# Patient Record
Sex: FEMALE | Race: WHITE | URBAN - NONMETROPOLITAN AREA
[De-identification: names, ages, dates, MRNs, and addresses within clinical notes are randomized per-mention and may not be internally consistent; named-entity substitution may affect disease eponyms.]

---

## 2017-06-14 ENCOUNTER — OFFICE VISIT - GICH (OUTPATIENT)
Dept: FAMILY MEDICINE | Facility: OTHER | Age: 82
End: 2017-06-14

## 2017-06-14 ENCOUNTER — HISTORY (OUTPATIENT)
Dept: FAMILY MEDICINE | Facility: OTHER | Age: 82
End: 2017-06-14

## 2017-06-14 DIAGNOSIS — Z66 DO NOT RESUSCITATE: ICD-10-CM

## 2017-06-14 DIAGNOSIS — H54.40 BLINDNESS OF ONE EYE WITH NORMAL VISION IN CONTRALATERAL EYE: ICD-10-CM

## 2017-06-14 DIAGNOSIS — Z95.2 PRESENCE OF PROSTHETIC HEART VALVE: ICD-10-CM

## 2017-06-14 DIAGNOSIS — I10 ESSENTIAL (PRIMARY) HYPERTENSION: ICD-10-CM

## 2017-06-14 DIAGNOSIS — E78.5 HYPERLIPIDEMIA: ICD-10-CM

## 2017-06-14 DIAGNOSIS — J30.2 OTHER SEASONAL ALLERGIC RHINITIS: ICD-10-CM

## 2017-06-14 LAB
INR - HISTORICAL: 2
PROTIME - HISTORICAL: 21.9 SEC (ref 11.9–15.2)

## 2017-06-15 ENCOUNTER — AMBULATORY - GICH (OUTPATIENT)
Dept: SCHEDULING | Facility: OTHER | Age: 82
End: 2017-06-15

## 2017-06-28 ENCOUNTER — ANTICOAGULATION - GICH (OUTPATIENT)
Dept: INTERNAL MEDICINE | Facility: OTHER | Age: 82
End: 2017-06-28

## 2017-06-28 ENCOUNTER — COMMUNICATION - GICH (OUTPATIENT)
Dept: FAMILY MEDICINE | Facility: OTHER | Age: 82
End: 2017-06-28

## 2017-06-28 DIAGNOSIS — Z79.01 LONG TERM CURRENT USE OF ANTICOAGULANT: ICD-10-CM

## 2017-06-28 DIAGNOSIS — Z95.2 PRESENCE OF PROSTHETIC HEART VALVE: ICD-10-CM

## 2017-06-28 LAB — INR - HISTORICAL: 3.1

## 2017-06-29 ENCOUNTER — AMBULATORY - GICH (OUTPATIENT)
Dept: SCHEDULING | Facility: OTHER | Age: 82
End: 2017-06-29

## 2017-07-12 ENCOUNTER — COMMUNICATION - GICH (OUTPATIENT)
Dept: FAMILY MEDICINE | Facility: OTHER | Age: 82
End: 2017-07-12

## 2017-07-12 DIAGNOSIS — R60.9 EDEMA: ICD-10-CM

## 2017-07-14 ENCOUNTER — ANTICOAGULATION - GICH (OUTPATIENT)
Dept: INTERNAL MEDICINE | Facility: OTHER | Age: 82
End: 2017-07-14

## 2017-07-14 DIAGNOSIS — Z79.01 LONG TERM CURRENT USE OF ANTICOAGULANT: ICD-10-CM

## 2017-07-14 DIAGNOSIS — Z95.2 PRESENCE OF PROSTHETIC HEART VALVE: ICD-10-CM

## 2017-07-14 LAB — INR - HISTORICAL: 3.3

## 2017-07-18 ENCOUNTER — MEDICAL CORRESPONDENCE (OUTPATIENT)
Dept: CARDIOLOGY | Facility: CLINIC | Age: 82
End: 2017-07-18
Payer: MEDICARE

## 2017-07-18 ENCOUNTER — OFFICE VISIT - GICH (OUTPATIENT)
Dept: CARDIOLOGY | Facility: OTHER | Age: 82
End: 2017-07-18

## 2017-07-18 ENCOUNTER — AMBULATORY - GICH (OUTPATIENT)
Dept: SCHEDULING | Facility: OTHER | Age: 82
End: 2017-07-18

## 2017-07-18 DIAGNOSIS — I10 ESSENTIAL (PRIMARY) HYPERTENSION: ICD-10-CM

## 2017-07-18 DIAGNOSIS — Z95.2 PRESENCE OF PROSTHETIC HEART VALVE: ICD-10-CM

## 2017-07-18 DIAGNOSIS — E78.49 OTHER HYPERLIPIDEMIA: ICD-10-CM

## 2017-07-18 PROCEDURE — 99204 OFFICE O/P NEW MOD 45 MIN: CPT | Mod: ZP | Performed by: NURSE PRACTITIONER

## 2017-07-19 ENCOUNTER — HISTORY (OUTPATIENT)
Dept: FAMILY MEDICINE | Facility: OTHER | Age: 82
End: 2017-07-19

## 2017-07-19 ENCOUNTER — OFFICE VISIT - GICH (OUTPATIENT)
Dept: FAMILY MEDICINE | Facility: OTHER | Age: 82
End: 2017-07-19

## 2017-07-19 DIAGNOSIS — R35.0 FREQUENCY OF MICTURITION: ICD-10-CM

## 2017-07-19 DIAGNOSIS — R60.9 EDEMA: ICD-10-CM

## 2017-07-19 DIAGNOSIS — G47.00 INSOMNIA: ICD-10-CM

## 2017-07-19 DIAGNOSIS — I10 ESSENTIAL (PRIMARY) HYPERTENSION: ICD-10-CM

## 2017-07-19 LAB
ALBUMIN SERPL-MCNC: 4.2 G/DL (ref 3.5–5.7)
ANION GAP - HISTORICAL: 10 (ref 5–18)
BACTERIA URINE: NORMAL BACTERIA/HPF
BILIRUB UR QL: NEGATIVE
BUN SERPL-MCNC: 21 MG/DL (ref 7–25)
BUN/CREAT RATIO - HISTORICAL: 25
CALCIUM SERPL-MCNC: 9.8 MG/DL (ref 8.6–10.3)
CHLORIDE SERPLBLD-SCNC: 99 MMOL/L (ref 98–107)
CLARITY, URINE: CLEAR CLARITY
CO2 SERPL-SCNC: 28 MMOL/L (ref 21–31)
COLOR UR: YELLOW COLOR
CREAT SERPL-MCNC: 0.84 MG/DL (ref 0.7–1.3)
EPITHELIAL CELLS: NORMAL EPI/HPF
GFR IF NOT AFRICAN AMERICAN - HISTORICAL: >60 ML/MIN/1.73M2
GLUCOSE SERPL-MCNC: 93 MG/DL (ref 70–105)
GLUCOSE URINE: NEGATIVE MG/DL
HEMOGLOBIN: 12.1 G/DL (ref 12–16)
KETONES UR QL: NEGATIVE MG/DL
LEUKOCYTE ESTERASE URINE: ABNORMAL
MCV RBC AUTO: 82 FL (ref 80–100)
NITRITE UR QL STRIP: NEGATIVE
OCCULT BLOOD,URINE - HISTORICAL: NEGATIVE
PH UR: 6.5 [PH]
PHOSPHATE SERPL-MCNC: 3.6 MG/DL (ref 2.5–5)
POTASSIUM SERPL-SCNC: 4.5 MMOL/L (ref 3.5–5.1)
PROTEIN QUALITATIVE,URINE - HISTORICAL: NEGATIVE MG/DL
RBC - HISTORICAL: NORMAL /HPF
SODIUM SERPL-SCNC: 137 MMOL/L (ref 133–143)
SP GR UR STRIP: 1.01
UROBILINOGEN,QUALITATIVE - HISTORICAL: NORMAL EU/DL
WBC - HISTORICAL: NORMAL /HPF

## 2017-07-20 ENCOUNTER — COMMUNICATION - GICH (OUTPATIENT)
Dept: FAMILY MEDICINE | Facility: OTHER | Age: 82
End: 2017-07-20

## 2017-08-01 ENCOUNTER — COMMUNICATION - GICH (OUTPATIENT)
Dept: FAMILY MEDICINE | Facility: OTHER | Age: 82
End: 2017-08-01

## 2017-08-01 DIAGNOSIS — I10 ESSENTIAL (PRIMARY) HYPERTENSION: ICD-10-CM

## 2017-08-03 ENCOUNTER — COMMUNICATION - GICH (OUTPATIENT)
Dept: FAMILY MEDICINE | Facility: OTHER | Age: 82
End: 2017-08-03

## 2017-08-03 ENCOUNTER — HISTORY (OUTPATIENT)
Dept: EMERGENCY MEDICINE | Facility: OTHER | Age: 82
End: 2017-08-03

## 2017-08-04 ENCOUNTER — HISTORY (OUTPATIENT)
Dept: FAMILY MEDICINE | Facility: OTHER | Age: 82
End: 2017-08-04

## 2017-08-04 ENCOUNTER — OFFICE VISIT - GICH (OUTPATIENT)
Dept: FAMILY MEDICINE | Facility: OTHER | Age: 82
End: 2017-08-04

## 2017-08-04 ENCOUNTER — HOSPITAL ENCOUNTER (OUTPATIENT)
Dept: RADIOLOGY | Facility: OTHER | Age: 82
End: 2017-08-04
Attending: FAMILY MEDICINE

## 2017-08-04 DIAGNOSIS — M79.662 PAIN OF LEFT LOWER LEG: ICD-10-CM

## 2017-08-04 DIAGNOSIS — M79.89 OTHER SPECIFIED SOFT TISSUE DISORDERS (CODE): ICD-10-CM

## 2017-08-04 DIAGNOSIS — I10 ESSENTIAL (PRIMARY) HYPERTENSION: ICD-10-CM

## 2017-08-04 DIAGNOSIS — M25.562 PAIN IN LEFT KNEE: ICD-10-CM

## 2017-08-04 DIAGNOSIS — R31.9 HEMATURIA: ICD-10-CM

## 2017-08-04 DIAGNOSIS — M71.22 SYNOVIAL CYST OF LEFT POPLITEAL SPACE: ICD-10-CM

## 2017-08-04 DIAGNOSIS — D72.829 ELEVATED WHITE BLOOD CELL COUNT: ICD-10-CM

## 2017-08-04 LAB
C-REACTIVE PROTEIN - HISTORICAL: 7.14 MG/DL
WHITE BLOOD COUNT - HISTORICAL: 10.3 THOU/CU MM (ref 4.5–11)

## 2017-08-08 ENCOUNTER — HISTORY (OUTPATIENT)
Dept: ORTHOPEDICS | Facility: OTHER | Age: 82
End: 2017-08-08

## 2017-08-08 ENCOUNTER — OFFICE VISIT - GICH (OUTPATIENT)
Dept: ORTHOPEDICS | Facility: OTHER | Age: 82
End: 2017-08-08

## 2017-08-08 DIAGNOSIS — M17.12 PRIMARY OSTEOARTHRITIS OF LEFT KNEE: ICD-10-CM

## 2017-08-08 DIAGNOSIS — M11.262 OTHER CHONDROCALCINOSIS, LEFT KNEE: ICD-10-CM

## 2017-08-08 DIAGNOSIS — M71.22 SYNOVIAL CYST OF LEFT POPLITEAL SPACE: ICD-10-CM

## 2017-08-09 ENCOUNTER — OFFICE VISIT - GICH (OUTPATIENT)
Dept: ORTHOPEDICS | Facility: OTHER | Age: 82
End: 2017-08-09

## 2017-08-09 ENCOUNTER — HISTORY (OUTPATIENT)
Dept: ORTHOPEDICS | Facility: OTHER | Age: 82
End: 2017-08-09

## 2017-08-09 DIAGNOSIS — M11.262 OTHER CHONDROCALCINOSIS, LEFT KNEE: ICD-10-CM

## 2017-08-09 DIAGNOSIS — M25.562 PAIN IN LEFT KNEE: ICD-10-CM

## 2017-08-09 DIAGNOSIS — M17.12 PRIMARY OSTEOARTHRITIS OF LEFT KNEE: ICD-10-CM

## 2017-08-11 ENCOUNTER — ANTICOAGULATION - GICH (OUTPATIENT)
Dept: INTERNAL MEDICINE | Facility: OTHER | Age: 82
End: 2017-08-11

## 2017-08-11 DIAGNOSIS — Z95.2 PRESENCE OF PROSTHETIC HEART VALVE: ICD-10-CM

## 2017-08-11 DIAGNOSIS — Z79.01 LONG TERM CURRENT USE OF ANTICOAGULANT: ICD-10-CM

## 2017-08-11 LAB — INR - HISTORICAL: 5.4

## 2017-08-24 ENCOUNTER — ANTICOAGULATION - GICH (OUTPATIENT)
Dept: INTERNAL MEDICINE | Facility: OTHER | Age: 82
End: 2017-08-24

## 2017-08-24 DIAGNOSIS — Z79.01 LONG TERM CURRENT USE OF ANTICOAGULANT: ICD-10-CM

## 2017-08-24 DIAGNOSIS — Z95.2 PRESENCE OF PROSTHETIC HEART VALVE: ICD-10-CM

## 2017-08-24 LAB — INR - HISTORICAL: 2.7

## 2017-09-12 ENCOUNTER — ANTICOAGULATION - GICH (OUTPATIENT)
Dept: INTERNAL MEDICINE | Facility: OTHER | Age: 82
End: 2017-09-12

## 2017-09-12 DIAGNOSIS — Z79.01 LONG TERM CURRENT USE OF ANTICOAGULANT: ICD-10-CM

## 2017-09-12 DIAGNOSIS — Z95.2 PRESENCE OF PROSTHETIC HEART VALVE: ICD-10-CM

## 2017-09-12 LAB — INR - HISTORICAL: 4.7

## 2017-09-19 ENCOUNTER — COMMUNICATION - GICH (OUTPATIENT)
Dept: FAMILY MEDICINE | Facility: OTHER | Age: 82
End: 2017-09-19

## 2017-09-19 ENCOUNTER — ANTICOAGULATION - GICH (OUTPATIENT)
Dept: INTERNAL MEDICINE | Facility: OTHER | Age: 82
End: 2017-09-19

## 2017-09-19 DIAGNOSIS — Z95.2 PRESENCE OF PROSTHETIC HEART VALVE: ICD-10-CM

## 2017-09-19 DIAGNOSIS — Z79.01 LONG TERM CURRENT USE OF ANTICOAGULANT: ICD-10-CM

## 2017-09-19 LAB — INR - HISTORICAL: 3.5

## 2017-10-03 ENCOUNTER — ANTICOAGULATION - GICH (OUTPATIENT)
Dept: INTERNAL MEDICINE | Facility: OTHER | Age: 82
End: 2017-10-03

## 2017-10-03 DIAGNOSIS — Z95.2 PRESENCE OF PROSTHETIC HEART VALVE: ICD-10-CM

## 2017-10-03 DIAGNOSIS — Z79.01 LONG TERM CURRENT USE OF ANTICOAGULANT: ICD-10-CM

## 2017-10-03 LAB — INR - HISTORICAL: 3.8

## 2017-10-23 ENCOUNTER — ANTICOAGULATION - GICH (OUTPATIENT)
Dept: INTERNAL MEDICINE | Facility: OTHER | Age: 82
End: 2017-10-23

## 2017-10-23 DIAGNOSIS — Z95.2 PRESENCE OF PROSTHETIC HEART VALVE: ICD-10-CM

## 2017-10-23 DIAGNOSIS — Z79.01 LONG TERM CURRENT USE OF ANTICOAGULANT: ICD-10-CM

## 2017-10-23 LAB — INR - HISTORICAL: 3.3

## 2017-10-24 ENCOUNTER — COMMUNICATION - GICH (OUTPATIENT)
Dept: FAMILY MEDICINE | Facility: OTHER | Age: 82
End: 2017-10-24

## 2017-10-30 ENCOUNTER — HOSPITAL ENCOUNTER (OUTPATIENT)
Dept: RADIOLOGY | Facility: OTHER | Age: 82
End: 2017-10-30
Attending: FAMILY MEDICINE

## 2017-10-30 ENCOUNTER — OFFICE VISIT - GICH (OUTPATIENT)
Dept: FAMILY MEDICINE | Facility: OTHER | Age: 82
End: 2017-10-30

## 2017-10-30 ENCOUNTER — HISTORY (OUTPATIENT)
Dept: FAMILY MEDICINE | Facility: OTHER | Age: 82
End: 2017-10-30

## 2017-10-30 DIAGNOSIS — Z95.2 PRESENCE OF PROSTHETIC HEART VALVE: ICD-10-CM

## 2017-10-30 DIAGNOSIS — T14.8XXA OTHER INJURY OF UNSPECIFIED BODY REGION, INITIAL ENCOUNTER (CODE): ICD-10-CM

## 2017-10-30 DIAGNOSIS — Z79.01 LONG TERM CURRENT USE OF ANTICOAGULANT: ICD-10-CM

## 2017-10-30 LAB
INR - HISTORICAL: 2.8
PROTIME - HISTORICAL: 33.2 SEC (ref 11.9–15.2)

## 2017-11-20 ENCOUNTER — ANTICOAGULATION - GICH (OUTPATIENT)
Dept: INTERNAL MEDICINE | Facility: OTHER | Age: 82
End: 2017-11-20

## 2017-11-20 DIAGNOSIS — Z95.2 PRESENCE OF PROSTHETIC HEART VALVE: ICD-10-CM

## 2017-11-20 DIAGNOSIS — Z79.01 LONG TERM CURRENT USE OF ANTICOAGULANT: ICD-10-CM

## 2017-11-20 LAB — INR - HISTORICAL: 3.8

## 2017-11-28 ENCOUNTER — OFFICE VISIT - GICH (OUTPATIENT)
Dept: FAMILY MEDICINE | Facility: OTHER | Age: 82
End: 2017-11-28

## 2017-11-28 ENCOUNTER — COMMUNICATION - GICH (OUTPATIENT)
Dept: FAMILY MEDICINE | Facility: OTHER | Age: 82
End: 2017-11-28

## 2017-11-28 ENCOUNTER — HISTORY (OUTPATIENT)
Dept: FAMILY MEDICINE | Facility: OTHER | Age: 82
End: 2017-11-28

## 2017-11-28 DIAGNOSIS — J06.9 ACUTE UPPER RESPIRATORY INFECTION: ICD-10-CM

## 2017-11-28 DIAGNOSIS — R05.9 COUGH: ICD-10-CM

## 2017-11-28 DIAGNOSIS — B97.89 OTHER VIRAL AGENTS AS THE CAUSE OF DISEASES CLASSIFIED ELSEWHERE: ICD-10-CM

## 2017-11-29 ENCOUNTER — COMMUNICATION - GICH (OUTPATIENT)
Dept: FAMILY MEDICINE | Facility: OTHER | Age: 82
End: 2017-11-29

## 2017-11-29 DIAGNOSIS — I10 ESSENTIAL (PRIMARY) HYPERTENSION: ICD-10-CM

## 2017-12-06 ENCOUNTER — ANTICOAGULATION - GICH (OUTPATIENT)
Dept: INTERNAL MEDICINE | Facility: OTHER | Age: 82
End: 2017-12-06

## 2017-12-06 DIAGNOSIS — Z79.01 LONG TERM CURRENT USE OF ANTICOAGULANT: ICD-10-CM

## 2017-12-06 DIAGNOSIS — Z95.2 PRESENCE OF PROSTHETIC HEART VALVE: ICD-10-CM

## 2017-12-06 LAB — INR - HISTORICAL: 4.2

## 2017-12-20 ENCOUNTER — ANTICOAGULATION - GICH (OUTPATIENT)
Dept: INTERNAL MEDICINE | Facility: OTHER | Age: 82
End: 2017-12-20

## 2017-12-20 DIAGNOSIS — Z95.2 PRESENCE OF PROSTHETIC HEART VALVE: ICD-10-CM

## 2017-12-20 DIAGNOSIS — Z79.01 LONG TERM CURRENT USE OF ANTICOAGULANT: ICD-10-CM

## 2017-12-20 LAB — INR - HISTORICAL: 3.7

## 2017-12-27 NOTE — PROGRESS NOTES
Patient Information     Patient Name MRN Milagros Duncan [id number] Female 1931      Progress Notes by Nicole Lee NP at 2017  3:45 PM     Author:  Nicole Lee NP Service:  (none) Author Type:  PHYS- Nurse Practitioner     Filed:  2017 12:31 PM Encounter Date:  2017 Status:  Signed     :  Nicole Lee NP (PHYS- Nurse Practitioner)            [facility]   CARDIOLOGY CONSULT    Milagros Peters  [address] [place] [id number]    Raisa Madrid MD    Chief Complaint     Patient presents with       Consult      s/p aortic valve replacement         HPI:  Milagros Peters is a 85 year old female who presents to establish cardiology care. She recently moved back to [place] from [place], [place]. She has a history of HTN, hyperlipidemia, chronic anticoagulation states and history of Aortic Valve replacement in . Review of Milagros's records show documentation of a Mosaic Porcine Aortic Valve 21mm. We know the average life expectancy of Porcine valves is approximately 10 years, it has been 14 years since she had this AVR. Denies any replacement of her porcine valve or mechanical valve. Discussed chronic anticoagulation as this is not typical long term following animal valves. We would see chronic anticoagulation following mechanical valve replacement. She admits to a history of DVT, she is not clear when this occurred and suspects this may be why she has continued anticoagulation. She denies any history of atrial fibrillation. She does seem somewhat confused regarding her past medical history. No history of tobacco use or diabetes. No CAD history. Last echocardiogram in [place], may have been over 1 year ago, we do not have result record of this.   Currently she denies any chest pain or tightness, no increased shortness of breath. No palpitations, lightheadedness or syncope. She has had some mild edema in her lower extremities. She recently established care with  Dr. Madrid and is scheduled to see her in follow-up tomorrow.       IMAGING RESULTS:  Review of past records from [place], [place] show Cardiolite stress test performed 6/3/15:  Myocardial perfusion images reveal no evidence of reversible ischemia. LVEF 75%. Small fixed perfusion defect involving distal lateral wall consistent with soft tissue attenuation artifact.       PAST MEDICAL HISTORY:  Past Medical History:     Diagnosis  Date     Aortic valve disease      Arthritis      Hyperlipidemia      Hypertension      PNEUMONIA 5/17/2012       FAMILY HISTORY:  Family History       Problem   Relation Age of Onset     Cancer  Mother      Asthma  Mother      Heart Disease  Father      heart problems         PAST SURGICAL HISTORY:  Past Surgical History:      Procedure  Laterality Date     AORTIC VALVE REPLACEMENT  2003     APPENDECTOMY  childhood     HYSTERECTOMY  1994    kept ovaries       TONSILLECTOMY  childhood       SOCIAL HISTORY:  Social History     Social History        Marital status:       Spouse name: N/A     Number of children:  3     Years of education:  N/A     Social History Main Topics       Smoking status: Never Smoker     Smokeless tobacco: Never Used     Alcohol use No     Drug use: Not on file     Sexual activity: Not on file     Other Topics   Concern      Service  No     Blood Transfusions  No     Caffeine Concern  No     1 cup a day       Occupational Exposure  No     Hobby Hazards  No     Sleep Concern  Yes     Stress Concern  Yes     recent move       Weight Concern  No     Special Diet  No     Back Care  No     Exercise  No     she has walker does not use.       Bike Helmet  No     Seat Belt  Yes     Self-Exams  No     Social History Narrative     [place]  Is preferred name.         Okay to talk to Sarah Suresh  [phone] in a medical emergency;  Daughter         Moved to [place].  Lives with daughter in [facility].   [place] [place] best  "hot dogs.   Best Pizza;   [employer] [address] in [facility] ;  Worked at grocery, telephone company store.        [employer] [employer] man, [place]  Approximately a month           1952;  Ran numbers          ~2 sons in [place]    daughter in the area~            CURRENT MEDICATIONS:  Current Outpatient Prescriptions on File Prior to Visit       Medication  Sig Dispense Refill     aspirin (ECOTRIN) 81 mg enteric coated tablet Take 1 tablet by mouth once daily with a meal.  0     atenolol (TENORMIN) 50 mg tablet Take 1 tablet by mouth 2 times daily.  0     chlorthalidone (HYGROTON) 25 mg tablet Take 1 tablet by mouth every morning. 14 tablet 0     loratadine (CLARITIN) 10 mg tablet Take 1 tablet by mouth once daily. 30 tablet 1     warfarin (COUMADIN) 2.5 mg tablet Take one tablet on Monday, Friday  0     warfarin (COUMADIN) 5 mg tablet Take 5 mg on Sunday, Tuesday, Wednesday, Thursday and Saturday  0     No current facility-administered medications on file prior to visit.        ALLERGIES:  No Known Allergies      ROS:  CONSTITUTIONAL:  No weight loss, fever, chills, weakness or fatigue.  CARDIOVASCULAR:  No chest pain, chest pressure or chest discomfort. No palpitations. Mild lower extremity edema.   RESPIRATORY:  No shortness of breath, dyspnea upon exertion, cough or sputum production.  NEUROLOGICAL:  No headache, lightheadedness, dizziness, syncope, ataxia or weakness. Review of past records reveals documented memory loss.   HEMATOLOGIC:  No anemia, bleeding or bruising.  ENDOCRINOLOGIC:  No reports of sweating, cold or heat intolerance. No polyuria or polydipsia.  SKIN:  No abnormal rashes or itching.      PHYSICAL EXAM:  /62  Pulse 60  Ht 1.53 m (5' 0.24\")  Wt 64.4 kg (142 lb)  LMP  (LMP Unknown)  BMI 27.52 kg/m2  GENERAL: The patient is a well-developed, well-nourished, in no apparent distress. Alert and oriented x3.  HEENT: Head is normocephalic and atraumatic. Eyes are symmetrical with " normal visual tracking. Nares appeared normal without nasal drainage. Mucous membranes are moist.   NECK: Supple. No carotid bruits. No appreciable JVD.  HEART: Regular rate and rhythm, S1S2, 1/6 systolic murmur heard best at left upper sternal border  LUNGS: Respirations regular and unlabored. Clear to auscultation.  GI: Abdomen is nondistended.   EXTREMITIES: Trace peripheral edema present bilaterally.   MUSCULOSKELETAL: No joint swelling.  NEUROLOGIC: Alert and oriented X3. No focal neurologic deficits.   SKIN: No jaundice. No rashes or visible skin lesions present.    EKG:  NSR, rate 61 bpm, nonspecific ST abnormality, no ischemic changes.     LAB RESULTS:  Anticoagulation on 07/14/2017        Component  Date Value Ref Range Status     INR 07/14/2017 3.3* <1.3 Final   Anticoagulation on 06/28/2017        Component  Date Value Ref Range Status     INR 06/28/2017 3.1* <1.3 Final   Office Visit on 06/14/2017        Component  Date Value Ref Range Status     INR 06/14/2017 2.0* <1.3 Final     PROTIME 06/14/2017 21.9* 11.9 - 15.2 sec Final         ASSESSMENT:  Milagros Peters presents to establish cardiology care, history of Porcine Aortic Valve Replacement in 2003 which is nearly extended its expectance. She has been asymptomatic. Minimal trace edema which has been chronic. She is on chronic anticoagulation and awaiting records to confirm need for use as typically long standing anticoagulation is not indicated with porcine valves as it would be following a mechanical valve replacement. She will be scheduled for a repeat echocardiogram. Her hypertension is not well controlled with given valve replacement. She is currently on Atenolol 50 mg BID.       PLAN:  1. S/P AVR  Extending life expectancy of Porcine valve. Repeat echocardiogram and see back in the cardiology clinic within 2-3 weeks for follow-up. She is currently asymptomatic.   Need tighter blood pressure control.     - AMB CONSULT TO CARDIOLOGY  - ECHO COMPLETE  WO CONTRAST; Future  - EKG 12 LEAD UNIT PERFORMED (PERFORMED TODAY)  - lisinopril (PRINIVIL; ZESTRIL) 10 mg tablet; Take 1 tablet by mouth once daily.  Dispense: 60 tablet; Refill: 3    2. HYPERTENSION  BP today 170/62, does not wish to monitor at home. We will start Lisinopril 10 mg daily and may also need to add additional thiazide diuretic if not improved. She does not have much room for dose increased of metoprolol given her heart rate at 60.     - ECHO COMPLETE WO CONTRAST; Future  - EKG 12 LEAD UNIT PERFORMED (PERFORMED TODAY)  - lisinopril (PRINIVIL; ZESTRIL) 10 mg tablet; Take 1 tablet by mouth once daily.  Dispense: 60 tablet; Refill: 3    3. Other hyperlipidemia  Documented history of hyperlipidemia, she needs a fasting lipid panel.         Thank you for allowing me to participate in the care of your patient. Please do not hesitate to contact me if you have any questions.     JESSI Norman, CFNP  Cardiology

## 2017-12-27 NOTE — PROGRESS NOTES
"Patient Information     Patient Name MRN Sex Milagros Santos [id number] Female 1931      Progress Notes by Riley Oneill DO at 2017  1:30 PM     Author:  Riley Oneill DO Service:  (none) Author Type:  PHYS- Osteopathic     Filed:  2017  1:55 PM Encounter Date:  2017 Status:  Signed     :  Riley Oneill DO (PHYS- Osteopathic)            PROGRESS NOTE    SUBJECTIVE:  Milagros Peters is here for recheck of a left knee.     HPI: The patient was seen yesterday for left knee complaints. Discussion at that time included recommendations to consider a left knee cortisone injection. She declined the injection yesterday. Presents today for a cortisone shot. Relates the knee has been acting up more. .    Review of Systems:  Constitutional: Denies constitutional problems    PFSH:  No change in information. See earlier PFSH questionnaire completed by the patient on initial visit.    OBJECTIVE:  /80  Pulse 60  Ht 1.537 m (5' 0.5\")  Wt 66.2 kg (146 lb)  LMP  (LMP Unknown)  BMI 28.04 kg/m2 Body mass index is 28.04 kg/(m^2).  Patient is alert and orientated x3 and answers questions appropriately.    Knee exam: Left   no redness or bruising of the knee. Mild soft tissue swelling.  Ambulates with a limp.  No instability.    ASSESSMENT:  Left knee osteoarthritis and chondrocalcinosis  increasing left knee pain    PLAN:  Plan for a left knee cortisone injection today.    Risks, benefits, conservative, surgical, and alternatives of treatment were thoroughly outlined. No guarantees were given.  She did verbalize an understanding. All questions and concerns were addressed.    PROCEDURE:  A written and oral informed consent was received from the patient. Risks and benefits were discussed including but not limited to infection and the possibility of continued symptoms even after injection. The patients left knee was sterilely prepped and draped after a timeout was performed. Utilizing ethyl chloride " the area was cooled.  With sterile technique a 22 gauge needle was introduced and approximately 1 cc of clear fluid was aspirated and then 6 cc of lidocaine and 2 cc of Celestone was injected.  A sterile bandage was applied and the patient tolerated the procedure well.  They where given a handout about injections to take home.   Apply ice to the knee as needed.    Questions answered. Plan to follow-up as needed    Riley Oneill D.O.  Orthopedic Surgeon    [address] [address]  Phone [phone]  Fax [phone]    1:52 PM 8/9/2017

## 2017-12-28 NOTE — PATIENT INSTRUCTIONS
Patient Information     Patient Name MRN Milagros Duncan [id number] Female 1931      Patient Instructions by Fang Warren RN at 2017 12:00 PM     Author:  Fang Warren RN Service:  (none) Author Type:  NURS- Registered Nurse     Filed:  2017 12:10 PM Encounter Date:  2017 Status:  Signed     :  Fang Warren RN (NURS- Registered Nurse)            2017 Details    Sun Mon Tue Wed Thu Fri Sat       1               2               3               4               5                 6               7               8               9               10               11               12                 13               14               15               16               17               18               19                 20               21               22               23               24      5 mg   See details      25      2.5 mg         26      5 mg           27      5 mg         28      2.5 mg         29      5 mg         30      5 mg         31      5 mg            Date Details    This INR check               How to take your warfarin dose     To take:  2.5 mg Take one of the 2.5 mg tablets.    To take:  5 mg Take one of the 5 mg tablets.           2017 Details    Sun Mon Tue Wed Thu Fri Sat          1      2.5 mg         2      5 mg           3      5 mg         4      2.5 mg         5      5 mg         6      5 mg         7      5 mg         8      2.5 mg         9      5 mg           10      5 mg         11      2.5 mg         12      5 mg         13      5 mg         14      5 mg         15               16                 17               18               19               20               21               22               23                 24               25               26               27               28               29               30                Date Details   No additional details    Date of next INR:  2017         How to take  your warfarin dose     To take:  2.5 mg Take one of the 2.5 mg tablets.    To take:  5 mg Take one of the 5 mg tablets.             Description          Continue same dose and recheck in 3 weeks. WINTER JAMESON RN    8/24/2017    12:09 PM            Anticoagulation Summary as of 8/24/2017     INR goal 2.5-3.5    Today's INR 2.7    Next INR check 9/14/2017          Call your Anticoagulation Clinic at Dept: [phone]   if:   1. Any medications are started, stopped, or there is a change in dose.  2. You experience any bleeding that is not easily stopped or if it is recurrent.  3. You notice an increase in bruising or any bruising that does not heal.  4. You are scheduled for surgery, colonoscopy, dental extraction or any other procedure where you may need to stop your Coumadin (warfarin).

## 2017-12-28 NOTE — TELEPHONE ENCOUNTER
Patient Information     Patient Name MRN Milagros Duncan [id number] Female 1931      Telephone Encounter by Lakisha Newberry at 2017  1:58 PM     Author:  Lakisha Newberry Service:  (none) Author Type:  (none)     Filed:  2017  1:59 PM Encounter Date:  2017 Status:  Signed     :  Lakisha Newberry            Notified that short supply of medication was sent in to pharmacy and apt for follow up was also set up.   Lakisha Newberry LPN........................2017  1:59 PM

## 2017-12-28 NOTE — TELEPHONE ENCOUNTER
Patient Information     Patient Name MRN Sex Milagros Santos [id number] Female 1931      Telephone Encounter by Maggie Lo at 2017  3:34 PM     Author:  Maggie Lo Service:  (none) Author Type:  (none)     Filed:  2017  3:35 PM Encounter Date:  2017 Status:  Signed     :  Maggie Lo            Patient stopped at window requesting to establish with Cardiology.  Would SER be willing to place a referral?  Maggie Lo ....................  2017   3:35 PM

## 2017-12-28 NOTE — TELEPHONE ENCOUNTER
Patient Information     Patient Name MRN Sex Milagros Santos [id number] Female 1931      Telephone Encounter by Raisa Madrid MD at 2017  5:26 PM     Author:  Raisa Madrid MD Service:  (none) Author Type:  Physician     Filed:  2017  5:26 PM Encounter Date:  2017 Status:  Signed     :  Raisa Madrid MD (Physician)            done

## 2017-12-28 NOTE — TELEPHONE ENCOUNTER
Patient Information     Patient Name MRN Milagros Duncan [id number] Female 1931      Telephone Encounter by Fang Zaragoza at 2017  1:52 PM     Author:  Fang Zaragoza Service:  (none) Author Type:  (none)     Filed:  2017  1:55 PM Encounter Date:  2017 Status:  Signed     :  Fang Zaragoza            Patient would like to get a 1 month supply of Atenolol at the [facility] pharmacy.      They still have a little bit left so patient can fill it there.    Fang Zaragoza LPN....................2017 1:55 PM

## 2017-12-28 NOTE — PATIENT INSTRUCTIONS
Patient Information     Patient Name MRN Milagros Duncan [id number] Female 1931      Patient Instructions by Arabella Longo RN at 10/3/2017  1:15 PM     Author:  Arabella Longo RN Service:  (none) Author Type:  NURS- Registered Nurse     Filed:  10/3/2017  1:24 PM Encounter Date:  10/3/2017 Status:  Signed     :  Arabella Longo RN (NURS- Registered Nurse)            2017 Details    Sun  Thu Fri Sat     1               2               3      2.5 mg   See details      4      2.5 mg         5      5 mg         6      2.5 mg         7      2.5 mg           8      5 mg         9      2.5 mg         10      5 mg         11      2.5 mg         12      5 mg         13      2.5 mg         14      2.5 mg           15      5 mg         16      2.5 mg         17      5 mg         18      2.5 mg         19      5 mg         20      2.5 mg         21      2.5 mg           22      5 mg         23      2.5 mg         24      5 mg         25               26               27               28                 29               30               31                    Date Details   10/03 This INR check       Date of next INR:  10/24/2017         How to take your warfarin dose     To take:  2.5 mg Take one of the 2.5 mg tablets.    To take:  2.5 mg Take half of a 5 mg tablet.    To take:  5 mg Take one of the 5 mg tablets.             Description          Decrease dose and recheck in 3 weeks.  .............Arabella Longo RN............  10/3/2017    1:23 PM                  Anticoagulation Summary as of 10/3/2017     INR goal 2.5-3.5    Today's INR 3.8    Next INR check 10/24/2017          Call your Anticoagulation Clinic at Dept: [phone]   if:   1. Any medications are started, stopped, or there is a change in dose.  2. You experience any bleeding that is not easily stopped or if it is recurrent.  3. You notice an increase in bruising or any bruising that does not heal.  4. You are  scheduled for surgery, colonoscopy, dental extraction or any other procedure where you may need to stop your Coumadin (warfarin).

## 2017-12-28 NOTE — TELEPHONE ENCOUNTER
Patient Information     Patient Name MRN Milagros Duncan [id number] Female 1931      Telephone Encounter by Leena Ramirez at 2017  8:53 AM     Author:  Leena Ramirez Service:  (none) Author Type:  (none)     Filed:  2017  9:31 AM Encounter Date:  2017 Status:  Signed     :  Leena Ramirez            Milagros's daughter called and states that Milagros has an appointment with Nicole Lee next week and an appointment with Dr. Canseco in August. Milagros was on a water pill (chlorithalidone 25 mg- take 2 tablets daily) and was weaning herself off of this medication and then decided she didn't need them anymore- she told [facility] this so they canceled her prescription. Last night, Milagros called her daughter and stated that she couldn't get her shoes on because her feet were so swollen so [facility] and [facility] did [facility] 3 pills of the chorithalidone. Milagros recently moved here and was seeing cardiologist Dr. Mcghee in [place] before. Daughter Marlene was wondering if Raisa Madrid MD could prescribe another chlorthalidone prescription until she sees cardiology. I did inform daughter Marlene that Raisa Madrid MD was out of office until next week but she wanted me to send this to another provider to possibly address.   Leena Ramirez LPN............................... 2017 9:28 AM

## 2017-12-28 NOTE — TELEPHONE ENCOUNTER
Patient Information     Patient Name MRN Sex Milagros Santos [id number] Female 1931      Telephone Encounter by Arabella Longo RN at 2017 11:19 AM     Author:  Arabella Longo RN Service:  (none) Author Type:  NURS- Registered Nurse     Filed:  2017 11:22 AM Encounter Date:  2017 Status:  Signed     :  Arabella Longo RN (NURS- Registered Nurse)            Anticoagulant    Office visit in the past 12 months.    Last visit with LION CARLSON was on: 2017 in [place] [facility]  Next visit with LION CARLSON is on: No future appointment listed with this provider  Next visit with Family Practice is on: No future appointment listed in this department    Lab tests:  PT/INR at least monthly    INR (no units)    Date Value   2017 4.7 (H)     HEMOGLOBIN                (g/dL)    Date Value   2017 11.2 (L)           Prescription refilled per RN Medication Refill Policy.................... Arabella Longo RN ....................  2017   11:20 AM

## 2017-12-28 NOTE — TELEPHONE ENCOUNTER
Patient Information     Patient Name MRN Sex Milagros Santos [id number] Female 1931      Telephone Encounter by Sofía Miller at 2017 10:41 AM     Author:  Sofía Miller Service:  (none) Author Type:  (none)     Filed:  2017 10:41 AM Encounter Date:  2017 Status:  Signed     :  Sofía Miller            Left message to call back  ....................Marii Miller LPN  2017   10:41 AM

## 2017-12-28 NOTE — PROGRESS NOTES
Patient Information     Patient Name MRN Sex Milagros Santos [id number] Female 1931      Progress Notes by Prince José MD at 10/30/2017  9:00 AM     Author:  Prince José MD Service:  (none) Author Type:  Physician     Filed:  10/30/2017 10:08 AM Encounter Date:  10/30/2017 Status:  Signed     :  Prince José MD (Physician)            SUBJECTIVE:  Milagros Peters is a 85 y.o. female here for foot bruising. Patient drove to [place] over the last couple of days. When she came home last night she had some bruising on the top of her left foot. She does not recall any fall or injury. She's not had any trauma. She does have a history of aortic valve replacement and is on lifelong warfarin. Her last INR was 10-23 and was 3.3. She's not had any bleeding from her nose, rectal bleeding or blood in her urine. No other bruising that she has noticed. Both her and her daughter state that her bruising this morning is much better than compared to yesterday.      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 valve surgery, attributed to blood clot          HYPERTENSION  2010     Hyperlipidemia  2010     AORTIC VALVE REPLACEMENT, HX OF  2010       Past Medical History:     Diagnosis  Date     Aortic valve disease      Arthritis      Hyperlipidemia      Hypertension      PNEUMONIA 2012       Past Surgical History:      Procedure  Laterality Date     AORTIC VALVE REPLACEMENT       APPENDECTOMY  childhood     HYSTERECTOMY      kept ovaries       TONSILLECTOMY  childhood       Current Outpatient Prescriptions       Medication  Sig Dispense Refill     acetaminophen-codeine, 300-30 mg, (TYLENOL #3) tablet Take 1 tablet by mouth every 4 hours if needed. Max acetaminophen  dose: 4000mg in 24 hrs. 20 tablet 0     aspirin (ECOTRIN) 81 mg enteric coated tablet Take 1 tablet by mouth once daily with a meal.  0     atenolol (TENORMIN) 50 mg tablet Take 1 tablet by mouth 2 times daily. 60 tablet 3     chlorthalidone (HYGROTON) 25 mg tablet Take 0.5 tablets by mouth every morning. Prn edema 30 tablet 1     warfarin (COUMADIN) 2.5 mg tablet Take one tablet on Monday, Wednesday, Friday, Saturday 50 tablet 0     warfarin (COUMADIN) 5 mg tablet Take 5 mg x 3 days TUE, Thurs, Sunday 60 tablet 0     No current facility-administered medications for this visit.      Medications have been reviewed by me and are current to the best of my knowledge and ability.      Allergies:  No Known Allergies    Family History       Problem   Relation Age of Onset     Cancer  Mother      Asthma  Mother      Heart Disease  Father      heart problems         Social History     Substance Use Topics       Smoking status: Never Smoker     Smokeless tobacco: Never Used     Alcohol use No       ROS:    As above otherwise ROS is unremarkable.      OBJECTIVE:  /52  Pulse 65  Wt 63.9 kg (140 lb 12.8 oz)  LMP  (LMP Unknown)  SpO2 93%  BMI 27.05 kg/m2    EXAM:  General Appearance: Pleasant, alert, appropriate appearance for age. No acute distress  Lungs: Normal chest wall and respirations. Clear to auscultation, no wheezes or crackles.  Cardiovascular: Regular rate and rhythm. While 6 systolic murmur heard best at the right upper sternal border.  Musculoskeletal: No edema.  Skin: Approximate 5 cm x 2 cm area of bruising is noted over the dorsal part her foot along her second, third and fourth metatarsal heads. No tenderness to palpation over her metatarsals. Range of motion is normal. Capillary refill in her toes is less than 2 seconds bilaterally. Dorsalis pedis and anterior tibialis pulses are noted.    Results for orders placed or performed in visit on 10/30/17      PROTIME-INR      Result  Value Ref Range    INR  2.8 (H) <1.3    PROTIME 33.2 (H) 11.9 - 15.2 sec         ASSESSEMENT AND PLAN:    Milagros was seen today for foot problem.    Diagnoses and all orders for this visit:    Bruising  -     XR FOOT 3 VIEWS LEFT; Future    ANTICOAGULATION RX  -     PROTIME-INR; Future    AORTIC VALVE REPLACEMENT, HX OF     given her history of warfarin use and new bruising without any trauma that she remembers we check pro time and the x-ray of her left foot. These were personally reviewed and shows no acute bony abnormalities. INR is still within normal limits. She may have had her bruising from excessive swelling that has since resolved. Would recommend compression stockings for her next long trip.      Geo José MD

## 2017-12-28 NOTE — TELEPHONE ENCOUNTER
Patient Information     Patient Name MRMilagros Foley [id number] Female 1931      Telephone Encounter by Raisa Madrid MD at 2017  4:54 PM     Author:  Raisa Madrid MD Service:  (none) Author Type:  Physician     Filed:  2017  5:57 PM Encounter Date:  2017 Status:  Signed     :  Raisa Madrid MD (Physician)            At Patient's request during office visit, MD instructed  to simply call and talk to daughter if medicines would be called in .  Therefore, last evening I called   daughter's phone number with whom the patient lives with , which was cell phone with results last evening.  No answer, and no identification,  so no message left.   I called grand daughter, Dulce Maria, who was with patient at time of office visit , who gave me daughter's home phone.   Brief message left with daughter, Nita Suresh, to let her mother know her meds called to pharmacy as everything was normal.      Patient called today again 5:44 pm;    Lakisha Ibarra LPN   She was told water pill was prn ,   Told to elevate legs,  As needed.     Informed normal labs, normal EKG    Go ahead with trazodone;      She did admit to instructing MD to call her daughter with results so she could know if she should  medications.   She apologized for the confusion.   During the phone call there was some conversation with daughter and patient now asks moving forward to always send labs from here forward to always send labs in mail.

## 2017-12-28 NOTE — PROGRESS NOTES
Patient Information     Patient Name MRN Milagros Duncan [id number] Female 1931      Progress Notes by Raisa Madrid MD at 2017  9:45 AM     Author:  Raisa Madrid MD Service:  (none) Author Type:  Physician     Filed:  2017  6:42 PM Encounter Date:  2017 Status:  Signed     :  Raisa Madrid MD (Physician)            Nursing Notes:   Pieter Adelita A  2017 10:42 AM  Signed  Patient presents to the clinic for medication renewal.  Adelita ALBERTO, CMA.......2017..9:55 AM      Subjective:  Milagros Peters is a 85 y.o. female who presents for  Sleep issues , intermittent pedal edema and urine freq    1. Urine frequency   patient reporting urinary frequency. She gets up 4-5 times at night to go to the bathroom. She was recently started on lisinopril yesterday but had been provided diuretic for intermittent pedal edema by Dr. Montes. She Denies any discharge vaginally. she is not having any burning. No history of kidney stone    2. Hypertension; edema    patient with history of hypertension. She is on atenolol as noted. She was started on lisinopril yesterday by nurse practitioner. She reports that she previously had problems with lisinopril which causes cough she took first dose today. She is willing to try it one more time. She did not have any problems with swelling. She denies any chest pain or palpitation. She does note that she has intermittent problems with edema in her ankles. She was started on chlorthalidone by Dr. Montes and has only taken a tablet twice. She is provided 14 tablets    3. Insomnia  patient reports that she has had a lifelong problem with insomnia. She has trouble falling asleep as well as staying asleep. She is not on any sleep medication she has tried over-the-counter products including melatonin without relief symptoms. She is not drinking caffeine after 5:00. She does not have a sleep diary. She is just  "fatigued    Allergies: reviewed in EMR  Medications: reviewed in EMR  Problem List/PMH: reviewed in EMR    Social Hx:  Social History     Substance Use Topics       Smoking status: Never Smoker     Smokeless tobacco: Never Used     Alcohol use No     Social History Narrative    [place]  Is preferred name.         Okay to talk to Sarah Suresh  [phone] in a medical emergency;  Daughter         Moved to [place].  Lives with daughter in [facility].   [place] [place] best hot dogs.   [employer];   [address] in [facility] ;  Worked at grocery, telephone company store.        [employer], [place]  Approximately a month           1952;  Ran numbers          ~2 sons in [place]    daughter in the area~             Family Hx:   Family History       Problem   Relation Age of Onset     Cancer  Mother      Asthma  Mother      Heart Disease  Father      heart problems         Objective:  /52  Pulse 60  Temp 97.1  F (36.2  C) (Tympanic)   Ht 1.537 m (5' 0.5\")  Wt 64.4 kg (142 lb)  LMP  (LMP Unknown)  BMI 27.28 kg/m2   she is alert oriented ×3 conversational no apparent distress PERRLA EOMI moist mucous membranes lungs clear heart sounds regular.Abdomen; soft , nontender, without HSM.  Ext without edema ;      Results for orders placed or performed in visit on 07/19/17      RENAL FUNCTION PANEL      Result  Value Ref Range    SODIUM 137 133 - 143 mmol/L    POTASSIUM 4.5 3.5 - 5.1 mmol/L    CHLORIDE 99 98 - 107 mmol/L    CO2,TOTAL 28 21 - 31 mmol/L    ANION GAP 10 5 - 18                    GLUCOSE 93 70 - 105 mg/dL    CALCIUM 9.8 8.6 - 10.3 mg/dL    BUN 21 7 - 25 mg/dL    CREATININE 0.84 0.70 - 1.30 mg/dL    BUN/CREAT RATIO           25                    GFR if African American >60 >60 ml/min/1.73m2    GFR if not African American >60 >60 ml/min/1.73m2    PHOSPHORUS 3.6 2.5 - 5.0 mg/dL    ALBUMIN 4.2 3.5 - 5.7 g/dL   HEMOGLOBIN      Result  " Value Ref Range    HEMOGLOBIN                12.1 12.0 - 16.0 g/dL    MCV                       82 80 - 100 fL   URINALYSIS W REFLEX MICROSCOPIC IF POSITIVE      Result  Value Ref Range    COLOR                     Yellow Yellow Color    CLARITY                   Clear Clear Clarity    SPECIFIC GRAVITY,URINE    1.010 1.010, 1.015, 1.020, 1.025                    PH,URINE                  6.5 6.0, 7.0, 8.0, 5.5, 6.5, 7.5, 8.5                    UROBILINOGEN,QUALITATIVE  Normal Normal EU/dl    PROTEIN, URINE Negative Negative mg/dL    GLUCOSE, URINE Negative Negative mg/dL    KETONES,URINE             Negative Negative mg/dL    BILIRUBIN,URINE           Negative Negative                    OCCULT BLOOD,URINE        Negative Negative                    NITRITE                   Negative Negative                    LEUKOCYTE ESTERASE        Trace (A) Negative                   URINALYSIS MICROSCOPIC      Result  Value Ref Range    RBC None Seen 0-2, None Seen /HPF    WBC None Seen 0-2, 3-5, None Seen /HPF    BACTERIA                  None Seen None Seen, Rare, Occasional, Few Bacteria/HPF    EPITHELIAL CELLS          None Seen None Seen, Few Epi/HPF       Assessment:    ICD-10-CM    1. Urine frequency R35.0 URINALYSIS W REFLEX MICROSCOPIC IF POSITIVE      URINALYSIS W REFLEX MICROSCOPIC IF POSITIVE      URINALYSIS MICROSCOPIC      URINALYSIS MICROSCOPIC   2. Hypertension I10 RENAL FUNCTION PANEL      HEMOGLOBIN      RENAL FUNCTION PANEL      HEMOGLOBIN   3. Insomnia, unspecified type G47.00 traZODone (DESYREL) 50 mg tablet            Plan:   -- Expected clinical course discussed   -- Medications and their side effects discussed  Patient Instructions     1.  normal labs then when necessary chlorthalidone which is a diuretic will be refilled to be used as needed for swelling  2. Elevate your feet above your heart twice a day for 30 minutes  3. Considered support stockings are available at local pharmacies or at sporting  [employer]  4. If EKG tracing shows normal QT then consideration for starting trazodone to help you sleep initially at 50 mg by mouth daily at bedtime when necessary.  5. You will be called with the results of your labs as well as letter sent  6.  If urinalysis shows you have infection antibiotic will be called to your pharmacy   7.  If you develop a dry persistent cough on lisinopril, then call MD.      Index   Edema   ________________________________________________________________________  TONEY POINTS    Edema is swelling in part of your body because of fluid that gets into the tissue. Edema can happen if you sit for a long time, or it can be a symptom of a disease or injury, or be caused by pregnancy or eating too much salt.    Treatment may include taking diuretic medicine, using support stockings, or eating less salt.    It may help if you raise your legs when you sit and avoid sitting or standing for long periods of time.  ________________________________________________________________________  What is edema?   Edema is swelling in part of your body because of fluid that gets into the tissue. It most commonly happens in the feet, ankles, or legs but may be in other parts of your body, such as your hands or face.   What is the cause?   Edema can happen if you sit for a long time or it can be a symptom of a disease or an injury. Some things that can cause fluid to build up include:    Heart, kidney, thyroid, or liver disease    Blood vessel problems, especially in the legs    Injury to part of the body    Burns, including sunburn    Allergies to foods, medicine, or insect stings    Side effects of some medicines  Edema can also be normal if you:    Eat too much salt, which causes your body to hold fluid    Are pregnant    Stand or walk a lot when the weather is warm    Sitting for a long time like when on a long flight  What are the symptoms?  Symptoms may include:     Swelling in one or more parts of the  body, usually painless, but which can hurt if severe    Puffy or shiny skin  Edema may be mild to severe. Mild edema may only cause a feeling of tightness in your hands or feet. With severe edema, pressing on an area of swelling could leave a dent or a pit in your skin that lasts several minutes or longer.  How is it diagnosed?  Your healthcare provider will ask about your symptoms and medical history and examine you. You may have tests or scans to check for possible causes of your edema.  How is it treated?   Treatment depends on the cause. Edema of the feet and legs caused by walking a lot in warm weather or by pregnancy may not need treatment. Treatment for some causes of edema may include:    Taking a water pill (diuretic)    Using support stockings    Eating less salt  How can I take care of myself?   It may help if you:     Raise your legs when you sit.    Do not cross your legs or ankles when you sit.    Avoid sitting or standing for long periods of time.    Weigh yourself as often as recommended by your healthcare provider. Write down your weight and tell your healthcare provider as soon as possible if you are starting to gain weight.  Follow the full course of treatment prescribed by your healthcare provider. Ask your provider:    How and when you will get your test results.    How long it will take to recover.    If there are activities you should avoid and when you can return to normal activities.    How to take care of yourself at home, including which foods to avoid.    What symptoms or problems you should watch for and what to do if you have them.  Make sure you know when you should come back for a checkup. Keep all appointments for provider visits or tests.  A healthy lifestyle may also help:    Eat a healthy diet.    Try to keep a healthy weight. If you are overweight, lose weight.    Stay fit with the right kind of exercise for you as advised by your healthcare provider.    Limit caffeine.    If you  smoke, try to quit. Talk to your healthcare provider about ways to quit smoking.    If you want to drink alcohol, ask your healthcare provider how much is safe for you to drink.    Try to get at least 7 to 9 hours of sleep each night.  Developed by [employer].  Adult Advisor 2016.3 published by [employer].  Last modified: 2016-03-23  Last reviewed: 2015-04-28  This content is reviewed periodically and is subject to change as new health information becomes available. The information is intended to inform and educate and is not a replacement for medical evaluation, advice, diagnosis or treatment by a healthcare professional.  References   Adult Advisor 2016.3 Index    Copyright   2016 [employer], a division of [employer] All rights reserved.        Index [place]   Diuretics   ________________________________________________________________________  KEY POINTS    Diuretics are medicines used to get rid of extra water and salt through your urine.    Make sure you know how and when to take your medicine. Do not take more or less than you are supposed to take.    Ask your healthcare provider or pharmacist what side effects the medicine may cause and what you should do if you have side effects.  ________________________________________________________________________  What are diuretics used for?  Diuretic medicine helps your body get rid of extra water and salt through your urine. Diuretics are also called water pills. They may be used alone or with other medicines to treat:    High blood pressure    Some types of heart disease, such as heart failure    Severe swelling in the legs or feet    Glaucoma, which is caused by fluid buildup in the eye    Some types of kidney and liver disease  These medicines may be used for other conditions as determined by your healthcare provider.  There are several different diuretics. Which diuretic is best for you depends on your condition and health.  How do they  work?  Diuretics work in the kidneys to help your body get rid of salt (sodium) and extra water. Removing extra water helps your blood pressure go down. When blood pressure is lower, the heart doesn t have to work as hard to pump blood to the rest of the body. Diuretics also reduce the symptoms of fluid buildup. This means less swelling in your legs or belly, and less shortness of breath.  What else do I need to know about this medicine?    Follow the directions that come with your medicine, including information about food or alcohol. Make sure you know how and when to take your medicine. Do not take more or less than you are supposed to take.    Many medicines have side effects. A side effect is a symptom or problem that is caused by the medicine. Ask your healthcare provider or pharmacist what side effects the medicine may cause and what you should do if you have side effects.    Diuretics may make you urinate more often. Talk to your healthcare provider about when the best time is to take your medicine so that you do not have to get up to urinate when you are sleeping.    Some diuretics change the way your body uses potassium. Ask your healthcare provider about this and if your medicine is one of these. You may need blood tests to check your potassium level.    Diuretics may make it easier for you to lose too much fluid and get dehydrated. Talk to your healthcare provider about the precautions that you should take while being treated with this medicine.    Talk to your healthcare provider if you need to change how much you eat or drink, such as before a test or a procedure. Ask if you need to change the way you take your medicines that day.    Try to get all of your prescriptions filled at the same place. Your pharmacist can help make sure that all of your medicines are safe to take together.    Keep a list of your medicines with you. List all of the prescription medicines, nonprescription medicines,  supplements, natural remedies, and vitamins that you take. Tell all healthcare providers who treat you about all of the products you are taking.  If you have any questions, ask your healthcare provider or pharmacist for more information. Be sure to keep all appointments for provider visits or tests.  Developed by [employer].  Adult Advisor 2016.3 published by [employer].  Last modified: 2016-04-18  Last reviewed: 2016-04-15  This content is reviewed periodically and is subject to change as new health information becomes available. The information is intended to inform and educate and is not a replacement for medical evaluation, advice, diagnosis or treatment by a healthcare professional.  References   Adult Advisor 2016.3 Index    Copyright   2016 [employer], a division of [employer] All rights reserved.       [facility]: Sleep Hygiene     General Information  Bedtime hygiene often consists of washing your face and brushing your teeth. Sleep hygiene refers to the sleep habits that you develop over a period of time.  Good sleep habits promote restful sleep and daytime alertness. They can also prevent the development of sleep problems and disorders.  Good Sleep Hygiene  You can create good sleep hygiene by doing the following.           Get regular exercise, but not right before bed. Regular exercise, especially in the afternoon, can help deepen your sleep. Strenuous (heavy) exercise within 4 hours before bedtime can decrease your ability to fall asleep because your body is too stimulated.           Find a good temperature for sleeping. A bedroom that is too cold or too hot can keep you awake. A cool bedroom is often the best temperature for sleeping.           Go to bed only when you are tired and get into your favorite sleeping position. If you can t fall asleep right away, leave the room and find something quiet to do (such as reading). When you are tired, go back to bed and try to fall  asleep.           Go to bed and wake up at the same time every day. Your body works on a rhythm, which means it needs to go to sleep and wake up at the same time every day. Get up when your alarm goes off. Avoid hitting the snooze button. Hitting the snooze button many times causes you to feel more tired than if you just get up, even on weekends or vacation. Keeping a regular schedule during the entire week helps your body establish on a normal pattern, and makes you feel more alert.           Avoid taking naps during the day. A daytime nap might make you feel better at the time, but it can cause problems for nighttime sleep. If you do nap, limit the time to one nap of less than 1 hour. Do not nap later than 3 p.m.           Avoid eating a heavy meal or spicy foods before bedtime. If you are hungry at bedtime, eat a light snack (such as a glass of warm milk or cheese and crackers).           Avoid drinking alcohol 4 to 6 hours before bedtime. Alcohol can make you feel sleepy right after drinking it, but a few hours later when the alcohol levels in your blood start to fall, your body becomes alert, or stimulated.           Avoid drinking/eating caffeine . Caffeine is a stimulant. Beverages such as coffee, tea and many sodas, and food such as chocolate, make your body more alert. Caffeine may not only affect how quickly you fall asleep, but it also affects the quality of your sleep.           Avoid nicotine before bed. Nicotine is a stimulant and can make it harder for you to fall asleep. Nicotine withdrawal during sleep can disrupt healthy sleep patterns. Quitting smoking or cutting down on tobacco can help you fall asleep easier and prevent waking up fewer times each night.           Use your bed only for sleep and sex. Let your body  know  that the bed is for sleeping.           Avoid noise and bright rooms. Distractions can keep you from relaxing. Consider putting up darkening shades on the windows, wearing  earplugs, or using a white noise machine if you live in a noisy neighborhood.           Avoid watching the clock. Lying in bed unable to sleep and getting frustrated makes matters worse. If you keep looking at your clock, move it out of your bedroom or turn it around.           Don t take your worries to bed. Try to find time to think and sort out problems earlier in the day. Keep a journal and write down your problems on a list. Star the more serious ones and work on how you will resolve them --  during the day.           If you think you may have depression, anxiety or excessive stress, talk with your health care provider. Problems getting to sleep or staying asleep can be signs of depression or anxiety.           If you take medicine, ask your health care provider or pharmacist if the medicine may be causing sleep problems.  Getting Up in the Middle of the Night  Many people wake up at night for various reasons. If you need to get up to use the bathroom, try to use a night light to see, instead of turning on a main light. Bright lights can stimulate your body and may keep you from falling asleep.  If you get up in the middle of the night and can t get back to sleep, do not stay in bed. Leave the bedroom and do a quiet activity (such as reading). Do not do office work, housework or watch television. When you are tired, lie down again and you should be able to get back to sleep in about 20 minutes.  Television  Many people have televisions in their bedrooms and fall asleep with the TV on. Watching TV before bed isn t a good idea because it can keep you up. It is not recommended that you have a TV in the bedroom. Having a radio on at bedtime (or a white noise machine) is a better option than TV.  If you have any questions or concerns about your sleep patterns, talk with your health care provider.  Statistics adapted from the American Academy of Sleep Medicine.    2014 [employer] - A TRADEMARK OF [employer]  HEALTH SYSTEM  OTHER TRADEMARKS USED ARE OWNED BY THEIR RESPECTIVE OWNERS  THIS FACT SHEET DOES NOT REPLACE MEDICAL OR PROFESSIONAL ADVICE; IT IS ONLY A GUIDE  [facility]-33294 (10/04)       Index Polish   Trazodone, Oral   TRAZ-oh-done  ________________________________________________________________________  KEY POINTS    This medicine is taken by mouth to treat depression. Take it exactly as directed.    This medicine may increase suicidal thoughts or actions in some people.    This medicine may cause unwanted side effects. Tell your healthcare provider if you have any side effects that are serious, continue, or get worse.    This medicine may cause life-threatening problems if you take it with certain other medicines. Tell all healthcare providers who treat you about all the prescription medicines, nonprescription medicines, supplements, natural remedies, and vitamins that you take.  ________________________________________________________________________  What are other names for this medicine?   Type of medicine: antidepressant  Generic and brand names: trazodone, oral  What is this medicine used for?  This medicine is taken by mouth to treat depression.  This medicine may be used to treat other conditions as determined by your healthcare provider.  What should my healthcare provider know before I take this medicine?   Before taking this medicine, tell your healthcare provider if you have ever had:    An allergic reaction to any medicine    Glaucoma    Heart disease or a heart attack    High or low blood pressure    Irregular heartbeat    Liver or kidney disease    Long QT syndrome (problems with electrical activity in the heart muscle)    Mental health problems such as bipolar disorder, paranoia, or schizophrenia    Low levels of potassium, magnesium, or sodium in your blood    Seizures    Thoughts of suicide  Do not take this medicine if you have taken an MAO inhibitor such as isocarboxazid (Marplan),  phenelzine (Nardil), selegiline, or tranylcypromine (Parnate) within the last 14 days.  Females of childbearing age: Tell your healthcare provider if you are pregnant or plan to become pregnant. It is not known whether this medicine will harm an unborn baby. Do not breast-feed while taking this medicine without your healthcare provider's approval.  How do I take it?   Read the Medication Guide that comes in the medicine package when you start taking this medicine and each time you get a refill.  Check the label on the medicine for directions about your specific dose. Take this medicine exactly as your healthcare provider prescribes. Do not take more of it or take it longer than prescribed. Do not stop taking this medicine without your healthcare provider's approval. You may have to reduce your dosage gradually. Stopping too quickly may cause withdrawal symptoms.  Check with your healthcare provider before using this medicine in children under age 18.  This medicine may come in different forms. If you have extended-release tablets, do not break, crush, or chew them. Swallow them whole with a full glass of water. Take the extended-release tablets at the same time every day in the late evening, preferably at bedtime, on an empty stomach. Ask your pharmacist if you have the extended-release tablets.   Take the regular tablets with food. You may break the regular tablets in half, but do not crush, or chew them. Swallow them with a full glass of water.  What if I miss a dose?  If you miss a dose, take it as soon as you remember unless it is almost time for the next scheduled dose. In that case, skip the missed dose and take the next one as directed. Do not take double doses. If you are not sure of what to do if you miss a dose, or if you miss more than one dose, contact your healthcare provider. Do not stop taking this medicine without your healthcare provider's approval. You may need to reduce your dose slowly to avoid  withdrawal symptoms.  What if I overdose?  If you or anyone else has intentionally taken too much of this medicine, call 911 or go to the emergency room right away. If you pass out, have seizures, weakness or confusion, or have trouble breathing, call 911. If you think that you or anyone else may have taken too much of this medicine, call the poison control center. Do this even if there are no signs of discomfort or poisoning. The poison control center number is [phone].  Symptoms of an acute overdose may include: drowsiness, erection that lasts more than 4 hours, vomiting, irregular heartbeat, seizures, slow breathing, breathing that stops.  What should I watch out for?   Antidepressant medicines may increase suicidal thoughts or actions in some children, teenagers, and young adults within the first few months of treatment or if your dose changes. Talk with your provider about this.  Behavior changes may be caused by the medicine or by depression or another mental health problem. Contact your provider right away if you or your family notice any disturbing changes in your thoughts or behavior, such as:    More outgoing or aggressive behavior than normal    Confusion    Hallucinations    Worsening of depression    Suicidal thoughts  This medicine may trigger angle-closure glaucoma. Contact your provider right away if you have eye pain, vision changes, or redness and swelling in or around your eye.  It may take several weeks before you start to feel better. Do not stop taking this medicine unless your healthcare provider tells you to do so. You may have withdrawal symptoms if you stop this medicine abruptly.  This medicine increases the effects of alcohol and other drugs that slow down your nervous system. Do not drink alcohol or take other medicines unless your healthcare provider approves.  This medicine may make you dizzy or drowsy or cause blurred vision. Do not drive or operate machinery unless you are fully  alert.  You may feel dizzy or faint when you get up quickly after sitting or lying down. Getting up slowly may help.  If you need emergency care, surgery, lab tests, or dental work, tell the healthcare provider or dentist you are taking this medicine.  Adults over the age of 65 may be at greater risk for side effects. Talk with your healthcare provider about this.  You may need blood tests regularly to find out how this medicine affects you. Keep all appointments for these tests.  This medicine may cause dry mouth. Sucking hard candy, taking sips of water, or chewing sugarless gum may help.  This medicine may cause a life-threatening problem called serotonin syndrome if you take it with certain other medicines, such as antidepressants, migraine medicines, pain medicines, some cough medicines, and Robertson s wort. Make sure that your providers know ALL of the medicines that you take. Contact your healthcare provider right away if you have:    Restlessness    Loss of coordination    Fast heart beat    Rapid changes in blood pressure    Increased body temperature    Nausea    Vomiting    Diarrhea  This medicine may increase your blood level of cholesterol or triglycerides. Talk to your healthcare provider about this.  Men: Rarely, this medicine may cause a painful erection of the penis that will not return to normal. If you have an erection lasting more than 4 hours, contact your healthcare provider or get medical care right away. It can lead to permanent erectile dysfunction if not treated.  What are the possible side effects?   Along with its needed effects, your medicine may cause some unwanted side effects. Some side effects may be very serious. Some side effects may go away as your body adjusts to the medicine. Tell your healthcare provider if you have any side effects that continue or get worse.  Life-threatening (Report these to your healthcare provider right away. If you are unable to reach your healthcare  provider right away, get emergency medical care or call 911 for help): Allergic reaction (hives; itching; rash; trouble breathing; tightness in your chest; swelling of your lips, tongue, and throat).  Serious (Report these to your healthcare provider right away.): Chest pain; chills; dark urine; fast or irregular heartbeat; hallucinations; heavy sweating; high fever; fainting; loss of bladder control; mouth sores; muscle or joint pain; new or sudden changes in mood, behaviors, thoughts, or feelings; numbness or tingling in the hands or feet; persistent headache; prolonged erection; rash; seizures; severe drowsiness; severe muscle stiffness; severe restlessness or panic attacks; thoughts of suicide; trouble concentrating or memory problems; trouble urinating; trouble walking or loss of balance; twitching or involuntary movement of your body or face; unexplained sore throat; unusual bruising or bleeding; unusual excitement; unusual tiredness or weakness; worsening depression; yellowing of your eyes or skin.  Other: Abnormal dreams; bloating; change in sense of taste; change in sexual ability or desire; constipation; diarrhea; mild dizziness; mild drowsiness; dry mouth; mild headache; increased appetite; nausea; trouble sleeping; vision problems; weight gain or loss.  What products might interact with this medicine?   When you take this medicine with other medicines, it can change the way this or any of the other medicines work. Nonprescription medicines, vitamins, natural remedies, and certain foods may also interact. Using these products together might cause harmful side effects. Talk to your healthcare provider if you are taking:    Abiraterone (Zytiga)    ACE inhibitors such as benazepril (Lotensin), captopril, enalapril (Vasotec), fosinopril, lisinopril (Prinivil, Zestril), moexipril (Univasc), quinapril (Accupril), and ramipril (Altace)    Alpha blockers such as alfuzosin (Uroxatral), doxazosin (Cardura),  prazosin (Minipress), silodosin (Rapaflo), tamsulosin (Flomax), and terazosin    Angiotensin receptor blockers (ARBs) such as azilsartan (Edarbi), candesartan (Atacand), eprosartan (Teveten), irbesartan (Avapro), losartan (Cozaar), olmesartan (Benicar), telmisartan (Micardis), and valsartan (Diovan)    Antianxiety medicines such as alprazolam (Xanax), chlordiazepoxide, clonazepam (Klonopin), diazepam (Valium), lorazepam (Ativan), and triazolam (Halcion)    Antiarrhythmic medicines (to treat irregular heartbeat) such as amiodarone (Cordarone, Pacerone), disopyramide (Norpace), dronedarone (Multaq), flecainide, procainamide, propafenone (Rythmol), and quinidine    Antibiotics such as azithromycin (Zithromax, Zmax), ciprofloxacin (Cipro), clarithromycin (Biaxin), erythromycin (E.E.S., Adiel-Tab, Erythrocin), isoniazid, levofloxacin (Levaquin), linezolid (Zyvox), metronidazole, moxifloxacin (Avelox), rifabutin (Mycobutin), rifampin (Rifadin), and telithromycin (Ketek)    Antidepressants such as amitriptyline, amoxapine, citalopram (Celexa), clomipramine (Anafranil), desipramine (Norpramin), desvenlafaxine (Pristiq), duloxetine (Cymbalta), escitalopram (Lexapro), fluoxetine (Prozac), fluvoxamine (Luvox), imipramine (Tofranil), levomilnacipran (Fetzima), nefazodone, nortriptyline (Pamelor), protriptyline (Vivactil), sertraline (Zoloft), trimipramine (Surmontil), venlafaxine (Effexor), vilazodone (Viibryd), and vortioxetine (Trintellix)    Antifungal medicines such as fluconazole (Diflucan), itraconazole (Sporanox), ketoconazole (Nizoral), posaconazole (Noxafil), and voriconazole (Vfend)    Antihistamines such as chlorpheniramine (Chlor-Trimeton), clemastine (Tavist), diphenhydramine (Benadryl), and hydroxyzine (Vistaril)    Antipsychotic medicines such as aripiprazole (Abilify), asenapine (Saphris), brexpiprazole (Rexulti), chlorpromazine, clozapine (Clozaril, FazaClo), fluphenazine, haloperidol (Haldol), iloperidone  (Fanapt), loxapine (Loxitane), lurasidone (Latuda), olanzapine (Zyprexa), paliperidone (Invega), perphenazine, pimozide (Orap), quetiapine (Seroquel), risperidone (Risperdal), thioridazine, trifluoperazine, and ziprasidone (Geodon)    Antiseizure medicines such as carbamazepine (Carbatrol, Epitol, Equetro, Tegretol), felbamate (Felbatol), gabapentin (Neurontin), lamotrigine (Lamictal), levetiracetam (Keppra), oxcarbazepine (Trileptal), phenytoin (Dilantin, Phenytek), primidone (Mysoline), tiagabine (Gabitril), topiramate (Qudexy, Topamax, Trokendi), and valproic acid (Depacon, Depakene, Depakote)    Arsenic trioxide (Trisenox)    Aspirin and other salicylates    Barbiturates such as butabarbital (Butisol), pentobarbital (Nembutal), phenobarbital, and secobarbital (Seconal)    Beta blockers such as acebutolol (Sectral), atenolol (Tenormin), bisoprolol (Zebeta), carvedilol (Coreg), labetalol (Trandate), metoprolol (Lopressor, Toprol), nadolol (Corgard), nebivolol (Bystolic), pindolol, and sotalol (Betapace, Sorine)    Bosentan (Tracleer)    Bupropion (Aplenzin, Forfivo, Wellbutrin, Buproban, Zyban)    Buspirone    Calcium channel blockers such as amlodipine (Norvasc), amlodipine/atorvastatin (Caduet), diltiazem (Cardizem, Cartia, Tiazac), felodipine, isradipine (DynaCirc), nicardipine (Cardene), nifedipine (Adalat CC, Procardia), nisoldipine (Sular), and verapamil (Calan, Covera, Verelan)    Cimetidine (Tagamet)    Colchicine (Colcrys)    Cough, cold, or allergy medicines and decongestants    Dextromethorphan, an ingredient in many cough, cold, or allergy medicines such as Robitussin-DM    Dextromethorphan/quinidine (Nuedexta)    Digoxin (Lanoxin)    Doxepin (Silenor)    HIV medicines such as atazanavir (Reyataz), darunavir (Prezista), delavirdine (Rescriptor), efavirenz (Sustiva), emtricitabine (Emtriva), etravirine (Intelence), fosamprenavir (Lexiva), indinavir (Crixivan), lopinavir/ritonavir (Kaletra), maraviroc  (Selzentry), nelfinavir (Viracept), nevirapine (Viramune), rilpivirine (Edurant), ritonavir (Norvir), saquinavir (Invirase), stavudine (Zerit), and tipranavir (Aptivus)    Imatinib (Gleevec)    Immunosuppressants such as cyclosporine (Gengraf, Neoral, Sandimmune) and tacrolimus (Astagraf, Prograf, Protopic)    Lithium (Lithobid)    MAO inhibitors such as isocarboxazid (Marplan), phenelzine (Nardil), selegiline (Eldepryl, Emsam, Zelapar), and tranylcypromine (Parnate) (Do not take this medicine and an MAO inhibitor within 14 days of each other.)    Medicines to treat low sodium levels such as conivaptan (Vaprisol) and tolvaptan (Samsca)    Medicines to treat or prevent blood clots such as apixaban (Eliquis), dabigatran (Pradaxa), rivaroxaban (Xarelto), and warfarin (Coumadin)    Metoclopramide (Metozolv, Reglan)    Migraine medicines such as almotriptan (Axert), dihydroergotamine (D.H.E. 45, Migranal), eletriptan (Relpax), ergotamine (Ergomar), frovatriptan (Frova), naratriptan (Amerge), rizatriptan (Maxalt), sumatriptan (Alsuma, Imitrex, Sumavel), and zolmitriptan (Zomig)    Milnacipran (Savella)    Muscle relaxants such as baclofen (Gablofen, Lioresal), carisoprodol (Soma), cyclobenzaprine (Amrix), dantrolene (Dantrium), methocarbamol (Robaxin), and tizanidine (Zanaflex)    Natural remedies such as gotu cricket, kava, Ryan's wort, SAMe, tryptophan, and valerian    Nausea medicines such as dolasetron (Anzemet), droperidol (Inapsine), ondansetron (Zofran), prochlorperazine (Compro), and promethazine    Nonsteroidal anti-inflammatory medicines (NSAIDs) such as celecoxib (Celebrex), diclofenac (Cambia, Voltaren, Zipsor), diflunisal, etodolac, ibuprofen (Advil, Motrin), indomethacin (Indocin), ketoprofen, ketorolac, meloxicam (Mobic), nabumetone (Relafen), naproxen (Aleve, Anaprox, Naprelan), oxaprozin (Daypro), piroxicam (Feldene), and sulindac (Clinoril)    Pain medicines such as codeine, fentanyl (Abstral, Actiq,  Duragesic, Fentora, Sublimaze), hydrocodone/acetaminophen (Norco, Vicodin), hydromorphone (Dilaudid, Exalgo), meperidine (Demerol), methadone (Dolophine, Methadose), morphine (Gracie, MS Contin), morphine/naltrexone (Embeda), oxycodone (OxyContin, Roxicodone), oxycodone/acetaminophen (Percocet, Roxicet), pentazocine (Talwin), tapentadol (Nucynta), and tramadol (ConZip, Ultram)    Parkinson s disease medicines such as bromocriptine (Cycloset, Parlodel), cabergoline, levodopa/carbidopa (Duopa, Rytary, Sinemet), entacapone (Comtan), pramipexole (Mirapex), rasagiline (Azilect), and ropinirole (Requip)    Paroxetine (Brisdelle, Paxil, Pexeva)    Procarbazine (Matulane)    Products that contain methylene blue (Hyophen, Prosed DS, Urophen, Malina)    Propranolol (Hemangeol, Inderal, InnoPran)    Sleeping pills such as eszopiclone (Lunesta), zaleplon (Sonata), and zolpidem (Ambien, Edluar, Intermezzo)    Vandetanib (Caprelsa)  Ask your healthcare provider or pharmacist if you need to avoid products that contain grapefruit, Rodessa oranges, and tangelos while you are taking this medicine. These fruits and juices can affect the way this medicine works and may increase your risk of serious side effects.  Do not drink alcohol while you are taking this medicine.  If you are not sure if your medicines might interact, ask your pharmacist or healthcare provider. Keep a list of all your medicines with you. List all the prescription medicines, nonprescription medicines, supplements, natural remedies, and vitamins that you take. Be sure that you tell all healthcare providers who treat you about all the products you are taking.   How should I store this medicine?  Store this medicine at room temperature. Keep the container tightly closed. Protect it from heat, high humidity, and bright light.    This advisory includes selected information only and may not include all side effects of this medicine or interactions with other medicines. Ask  your healthcare provider or pharmacist for more information or if you have any questions.  Ask your pharmacist for the best way to dispose of outdated medicine or medicine you have not used. Do not throw medicine in the trash.  Keep all medicines out of the reach of children.   Do not share medicines with other people.   Developed by [employer].  Medication Advisor 2016.3 published by [employer].  Last modified: 2016-07-28  Last reviewed: 2016-01-11  This content is reviewed periodically and is subject to change as new health information becomes available. The information is intended to inform and educate and is not a replacement for medical evaluation, advice, diagnosis or treatment by a healthcare professional.  References   Medication Advisor 2016.3 Index    Copyright   2016 [employer], a division of [employer] All rights reserved.             Electronically signed by Raisa Madrid MD

## 2017-12-28 NOTE — PROGRESS NOTES
Patient Information     Patient Name MRN Sex Milagros Echavarria [id number] Female 1931      Progress Notes by Riley Oneill DO at 2017  3:45 PM     Author:  Riley Oneill DO Service:  (none) Author Type:  PHYS- Osteopathic     Filed:  2017  3:55 PM Encounter Date:  2017 Status:  Signed     :  Riley Oneill DO (PHYS- Osteopathic)            Milagros Peters was seen in consultation for Raisa Madrid MD for a chief complaint of left knee pain.    CHIEF COMPLAINT: Milagros Peters is a 85 y.o.  female  Chief Complaint     Patient presents with       Consult      L knee Ufchs's cyst       HISTORY OF PRESENTING INJURY:  85-year-old female relates that she started experiencing left knee pain one night while laying in bed. No trauma that she remembers. Noticed the knee was warm and swollen. She called her granddaughter and was taken to the emergency room for evaluation. X-rays taken on 2017. No fractures. 2 view x-ray demonstrating some arthritic changes around the knee. Small area of calcification in the posterior soft tissue. The patient subsequently presented to Dr. Madrid the next day because of continued knee pain. She was referred to orthopedics.    At this time, the patient relates the knee is feeling a bit better. She has stairs and steps to go up and down at home and is managing his best she can. There is less pain around the knee. She was concerned about the lump in the back of her knee on x-ray. When questioned she relates there is soreness to the knee but it doesn't seem severe. It is getting a bit better.  She presents with a cane. The warmth and swelling seems to have diminished.    REVIEW OF SYSTEMS:  Constitutional:  Denies constitutional problems  Cardiovascular: normal  Respiratory: normal    The review of systems as documented in the HPI and on the intake questionnaire, completed by the patient 2017, have been reviewed by myself and the pertinent positives and  "negatives addressed.  The remainder of the complete review of systems was non-contributory.    (PFSH) PAST, FAMILY, and/or SOCIAL HISTORY:    PAST MEDICAL HISTORY:  Past Medical History:     Diagnosis  Date     Aortic valve disease      Arthritis      Hyperlipidemia      Hypertension      PNEUMONIA 5/17/2012       PAST SURGICAL HISTORY:  Past Surgical History:      Procedure  Laterality Date     AORTIC VALVE REPLACEMENT  2003     APPENDECTOMY  childhood     HYSTERECTOMY  1994    kept ovaries       TONSILLECTOMY  childhood       ALLERGIES:  No Known Allergies    CURRENT MEDICATIONS:  Current Outpatient Prescriptions       Medication  Sig Dispense Refill     acetaminophen-codeine, 300-30 mg, (TYLENOL #3) tablet Take 1 tablet by mouth every 4 hours if needed. Max acetaminophen dose: 4000mg in 24 hrs. 20 tablet 0     aspirin (ECOTRIN) 81 mg enteric coated tablet Take 1 tablet by mouth once daily with a meal.  0     atenolol (TENORMIN) 50 mg tablet Take 1 tablet by mouth 2 times daily. 60 tablet 3     chlorthalidone (HYGROTON) 25 mg tablet Take 0.5 tablets by mouth every morning. Prn edema 30 tablet 1     warfarin (COUMADIN) 2.5 mg tablet Take one tablet on Monday, Friday  0     warfarin (COUMADIN) 5 mg tablet Take 5 mg on Sunday, Tuesday, Wednesday, Thursday and Saturday  0     No current facility-administered medications for this visit.      Medications have been reviewed by me and are current to the best of my knowledge and ability.      FAMILY HISTORY:  Family History       Problem   Relation Age of Onset     Cancer  Mother      Asthma  Mother      Heart Disease  Father      heart problems         Additional [facility] information documented on the intake form completed by the patient 8/8/2017 was reviewed by myself.    PHYSICAL EXAM:   /84  Pulse 68  Ht 1.537 m (5' 0.5\")  Wt 66.4 kg (146 lb 6.4 oz)  LMP  (LMP Unknown)  Breastfeeding? No  BMI 28.12 kg/m2 Body mass index is 28.12 kg/(m^2).    General Appearance: " Pleasant female in good appearance, mood and affect.  Alert and orientated times three ( time, date and location).    Examination of Left knee:    Skin: Normal.    No redness of the left knee appreciated. No bruising.  Don't appreciate any significant warmth compared to the opposite knee.    Sensation is Normal  Alignment: Neutral  Effusion: none  Passive extension: Lacks several degrees of full passive extension   Passive flexion: 120+ with no discomfort elicited today.  Patella tracks:  without an inverted J sign  there is patellofemoral crepitus on exam.   Ligaments appear stable on exam.    Apley's grind test: Positive.   No popliteal fullness or masses palpable.    Hip: Left  comfortable passive motion.     Calf:  negative tenderness    Neurological / Vascular Examination:  Lower extremity edema present: Minimal    Xray/MRI/MRA:  Radiographic images where independently reviewed and discussed with the patient.   Two-view x-ray of the left knee demonstrates degenerative changes to the patellofemoral articulation on the lateral view. Area of chondrocalcinosis on the AP x-ray between the tibia and femur. Small area of calcification in the posterior soft tissue.    Attending Doctor: LION CARLSON ([id number])  :       KEENA SUBRAMANIAN ([id number])  Report Date:       08/04/2017 11:43:31  Report Status:       Final  ======================= Begin of Report Content ======================    Exam: XR KNEE 2 VIEWS LEFT  History: Essential hypertension  Technique: 2 views.  Findings: There is moderate narrowing of medial and lateral joint spaces with small osteophytes. Degenerative changes also suggested in the patellofemoral joint. There is chondrocalcinosis.  No fracture is seen. There is a calcification posterior to the joint, possibly loose body.    Impression: Tricompartmental degenerative change.  Electronically Signed By: Keena Subramanian M.D. on 8/4/2017 11:39 AM    IMPRESSION:  85-year-old female with  recent onset of left knee pain.   No fractures appreciated on x-ray.   Degenerative left knee arthritis and suspected patellofemoral involvement but no sunrise view available.   Left knee chondrocalcinosis  Area of calcification in the posterior soft tissues. I doubt this is a loose body in the joint because of how posterior it is located. Discussed with the patient.   Patient is on Coumadin and had an elevated INR of 3.5.   At this time the patient's knee seems to be improving according to information today.     PLAN:  I discussed treatment options including a cortisone injection to the knee joint but at this point none was given. The patient seems to be managing the knee better today.   No recommendation for surgical intervention.  Continue with a cane.   Discussed the use of Tylenol given she takes Coumadin.   Also discussed the use of a cold pack or ice as needed.   If the knee flares up I would recommend checking her again in orthopedics.  Questions answered.    Riley Oneill D.O., F.FAY.O.A.O.  Orthopedic Surgeon    [facility] and [address] [address]  Phone [phone]  Fax [phone]    3:43 PM 8/8/2017

## 2017-12-28 NOTE — PROGRESS NOTES
Patient Information     Patient Name MRN Milagros Duncan [id number] Female 1931      Progress Notes by Karena Abbott NP at 2017  3:15 PM     Author:  Karena Abbott NP Service:  (none) Author Type:  PHYS- Nurse Practitioner     Filed:  2017  1:30 PM Encounter Date:  2017 Status:  Signed     :  Karena Abbott NP (PHYS- Nurse Practitioner)            HPI:    Milagros Peters is a 85 y.o. female who presents to clinic today for cough and cold sx. She has had cough for the past several days. Having SOB with coughing. Cough is productive at times. No fevers. She denies chest pain, has congestion. She denies asthma or COPD. Daughter is with her today and reports she has some mild confusion.     Past Medical History:     Diagnosis  Date     Aortic valve disease      Arthritis      Hyperlipidemia      Hypertension      PNEUMONIA 2012     Past Surgical History:      Procedure  Laterality Date     AORTIC VALVE REPLACEMENT       APPENDECTOMY  childhood     HYSTERECTOMY      kept ovaries       TONSILLECTOMY  childhood     Social History     Substance Use Topics       Smoking status: Never Smoker     Smokeless tobacco: Never Used     Alcohol use No     Current Outpatient Prescriptions       Medication  Sig Dispense Refill     acetaminophen-codeine, 300-30 mg, (TYLENOL #3) tablet Take 1 tablet by mouth every 4 hours if needed. Max acetaminophen dose: 4000mg in 24 hrs. 20 tablet 0     aspirin (ECOTRIN) 81 mg enteric coated tablet Take 1 tablet by mouth once daily with a meal.  0     atenolol (TENORMIN) 50 mg tablet Take 1 tablet by mouth 2 times daily. 60 tablet 3     chlorthalidone (HYGROTON) 25 mg tablet Take 0.5 tablets by mouth every morning. Prn edema 30 tablet 1     warfarin (COUMADIN) 2.5 mg tablet Take one tablet on Monday, Wednesday, Friday, Saturday 50 tablet 0     warfarin (COUMADIN) 5 mg tablet Take 5 mg x 3 days TUE, Thurs,  60 tablet 0     No current  facility-administered medications for this visit.      Medications have been reviewed by me and are current to the best of my knowledge and ability.    No Known Allergies    ROS:  Pertinent positives and negatives are noted in HPI.    EXAM:  General appearance: well appearing elderly female, in no acute distress  Head: normocephalic, atraumatic  Ears: TM's with cone of light, no erythema, canals clear bilaterally  Eyes: conjunctivae normal  Orophayrnx: moist mucous membranes, tonsils without erythema, exudates or petechiae, no post nasal drip seen  Neck: supple without adenopathy  Respiratory: clear to auscultation bilaterally, occasional cough, no respiratory distress  Cardiac: RRR with no murmurs  Psychological: normal affect, alert and pleasant    ASSESSMENT/PLAN:    ICD-10-CM    1. Viral URI with cough J06.9      B97.89    2. Cough R05 MA PULSE OXIMETRY SINGLE DETERMINATION   Cough seems to be related to viral URI. Reviewed s/s that would warrant f/u. No antibiotic is needed at this time. Symptoms typically worse on days 3-4 and then begin improving each day. If symptoms begin worsening or fail to improve after 10-14 days, return to clinic for reevaluation. All questions were answered and she is in agreement with plan.         Patient Instructions   Return to clinic if cough worsens or does not improve  May use Coricidin HBP for cough  Honey is good for cough  Increase fluids

## 2017-12-28 NOTE — PROGRESS NOTES
"Patient Information     Patient Name MRN Sex Milagros Santos [id number] Female 1931      Progress Notes by Raisa Madrid MD at 2017 10:00 AM     Author:  Raisa Madrid MD Service:  (none) Author Type:  Physician     Filed:  2017  9:12 PM Encounter Date:  2017 Status:  Signed     :  Raisa Madrid MD (Physician)            Nursing Notes:   Lakisha Newberry  2017 10:15 AM  Signed  Patient is following up from the ER for Left knee pain and states the pain meds not helping.   Lakisha Newberry LPN........................2017  10:11 AM     Subjective:  Milagros Peters is a 85 y.o. female who presents for  Left knee pain     Patient is a pleasant 85-year-old white female who reports that she slipped when she was going into her bed the other day and twisted her knee but has no other cause for pain in her left knee. No specific trauma. She was seen in the emergency room last night after she was having troubles with walking on her leg and noted that her left knee was red hot and swollen. Evaluation last night in the emergency room showed normal renal panel negative d-dimer white count was 11.8 with minor shift with 73.9 but otherwise normal. ER physician did not feel that her leg was grossly swollen compared to right did not feel that there is any redness though slightly warm to touch and negative calf pain. She is on Coumadin and her INR was noted to be 3.5 she had red cells in her urine. She was discharged on Tylenol with Codeine and told to follow-up in the clinic if not improving. Patient states that Tylenol 3 did\" nothing\". Her pain is worse. She is having trouble to get up and out of bed and going to bathroom much less other ADLs that she needs to complete. It is throbbing pain. She is here with her granddaughter. She states that it feels more swollen on the posterior aspect of her knee and is in stable. Her knee feels like it's getting give out at times " lock.    She denies any chest pain palpitations nausea vomiting or diarrhea. Did not like the Tylenol 3. Denies any constipation.  She denies any dysuria; some frequency.  No hemorrhoids    No Known Allergies  Current Outpatient Prescriptions on File Prior to Visit       Medication  Sig Dispense Refill     acetaminophen-codeine, 300-30 mg, (TYLENOL #3) tablet Take 1 tablet by mouth every 4 hours if needed. Max acetaminophen dose: 4000mg in 24 hrs. 20 tablet 0     aspirin (ECOTRIN) 81 mg enteric coated tablet Take 1 tablet by mouth once daily with a meal.  0     chlorthalidone (HYGROTON) 25 mg tablet Take 0.5 tablets by mouth every morning. Prn edema 30 tablet 1     warfarin (COUMADIN) 2.5 mg tablet Take one tablet on Monday, Friday  0     warfarin (COUMADIN) 5 mg tablet Take 5 mg on Sunday, Tuesday, Wednesday, Thursday and Saturday  0     No current facility-administered medications on file prior to visit.        Problem List/PMH: reviewed in EMR    Social Hx:  Social History     Substance Use Topics       Smoking status: Never Smoker     Smokeless tobacco: Never Used     Alcohol use No     Social History Narrative    [place]  Is preferred name.         Okay to talk to Sarah Suresh  [phone] in a medical emergency;  Daughter         Moved to [place].  Lives with daughter in [facility].   [facility] [employer] [facility] [place] has best hot dogs.   Best Pizza;   [employer] [address] in [facility] ;  Worked at [employer], telephone company store.        [employer], [place]  Approximately a month           1952;  Ran numbers          ~2 sons in [place]    daughter in the area~             Family Hx:   Family History       Problem   Relation Age of Onset     Cancer  Mother      Asthma  Mother      Heart Disease  Father      heart problems         Objective:  /70  Pulse 64  Wt 59 kg (130 lb)  LMP  (LMP Unknown)  Breastfeeding? No  BMI 23.78 kg/m2        She is sitting in  wheel chair.  Articulate oriented times three.   She is unable to walk on her left knee without significant discomfort and use of her cane. .  CIERRA, EOMI.  ( she has decreased vision ) lungs clear.  Cv;  Rate controlled irregular rhythm.  Abdomen is soft obese,   Left hip with limited ROM  With internal and external but similar on right hip.  Left knee is slightly swollen compared to left.  OA changes.  Tender with palpation over medial joint line   + baker's cyst.  Unable to perform Cecilia or lachman secondary to patient's discomfort.    She has trace edema left lower extremity compared to upper extremity  varicosities noted.       Results for orders placed or performed in visit on 08/04/17      WHITE BLOOD COUNT      Result  Value Ref Range    WHITE BLOOD COUNT         10.3 4.5 - 11.0 thou/cu mm   CRP, inflammatory      Result  Value Ref Range    C-REACTIVE PROTEIN 7.143 (H) <1.000 mg/dL         Exam: XR KNEE 2 VIEWS LEFT     History: Essential hypertension     Technique: 2 views.     Findings: There is moderate narrowing of medial and lateral joint spaces with small osteophytes. Degenerative changes also suggested in the patellofemoral joint. There is chondrocalcinosis.     No fracture is seen. There is a calcification posterior to the joint, possibly loose body.            Impression: Tricompartmental degenerative change.     Electronically Signed By: Keena Rogers M.D. on 8/4/2017 11:39 AM       Given concern for Baker's cyst proceed with ultrasound left lower extremity which confirmed Baker's cyst    Exam:US VENOUS LOWER EXTREMITY LEFT     History: 85 years Female Acute pain of left knee     Comparisons:     Technique: Venous duplex ultrasonography of the left lower extremity was performed.      Findings: The common femoral vein, superficial femoral vein and popliteal vein are fully compressible with spontaneous and augmentable venous flow.     A popliteal cyst is present measuring 3.2 x 1.7 cm in  diameter. There is an echogenic shadowing structure within the lumen of the cyst suggesting presence of a partially calcified or ossified joint body.            Impression: No evidence of deep venous thrombosis within the left lower extremity.     Sizable popliteal cyst with presence of a joint body.     Electronically Signed By: Venancio Gale M.D. on 8/4/2017 12:23 PM      Patient did not tolerate hinge brace;  Also indicates ' given my macular degeneration , I won't be able to see to put it on.  I don't have help 24 hours a day'                   Reading Physician Reading Date Result Priority         Keena Rogers MD 8/4/2017                    Narrative               Exam: XR KNEE 2 VIEWS LEFT    History: Essential hypertension    Technique: 2 views.    Findings: There is moderate narrowing of medial and lateral joint spaces with small osteophytes. Degenerative changes also suggested in the patellofemoral joint. There is chondrocalcinosis.    No fracture is seen. There is a calcification posterior to the joint, possibly loose body.           Impression: Tricompartmental degenerative change.    Electronically Signed By: Keena Rogers M.D. on 8/4/2017 11:39 AM          Exam:US VENOUS LOWER EXTREMITY LEFT     History: 85 years Female Acute pain of left knee     Comparisons:     Technique: Venous duplex ultrasonography of the left lower extremity was performed.      Findings: The common femoral vein, superficial femoral vein and popliteal vein are fully compressible with spontaneous and augmentable venous flow.     A popliteal cyst is present measuring 3.2 x 1.7 cm in diameter. There is an echogenic shadowing structure within the lumen of the cyst suggesting presence of a partially calcified or ossified joint body.            Impression: No evidence of deep venous thrombosis within the left lower extremity.     Sizable popliteal cyst with presence of a joint body.     Electronically Signed By:  Venancio Gale M.D. on 8/4/2017 12:23 PM  Assessment:    ICD-10-CM    1. Leukocytosis, unspecified type D72.829 URINE CULTURE      URINE CULTURE   2. HYPERTENSION I10 atenolol (TENORMIN) 50 mg tablet      XR KNEE 2 VIEWS LEFT   3. Hematuria  R31.9 URINE CULTURE      URINE CULTURE   4. Acute pain of left knee M25.562 WHITE BLOOD COUNT      CRP, inflammatory      WHITE BLOOD COUNT      CRP, inflammatory      US VENOUS LOWER EXTREMITY LEFT   5. Pain and swelling of left lower leg M79.662 US VENOUS LOWER EXTREMITY LEFT     M79.89    6. Baker's cyst of knee, left M71.22 AMB CONSULT TO ORTHOPEDICS - AFFILIATE ONLY      HYDROcodone-acetaminophen, 5-325 mg, (NORCO) per tablet       Culture urine given hematuria and elevated white count last evening. At this time no antibiotic warranted.  Viewed with patient and her granddaughter Baker's cyst probable meniscal tear. Suggested hinge brace patient declined our brace as noted above.   trial of hydrocodone for patient after risks benefits and alternatives were discussed.  Reviewed hematuria;   Patient's pharmacy can't obtained atenolol.  She wants to have it faxed to [facility].     I spent approximately 25 minutes with the patient (exclusive of separately billed services/procedures), with greater than 50% spent in counseling, prognosis, risks and benefits of management or follow-up.  Reviewed importance of compliance with chosen treatment options and follow-up.  Risk factor reduction and patient education and coordinating care, establishing and/or reviewing the patient's medical record also completed during today's exam .          Plan:   -- Expected clinical course discussed   -- Medications and their side effects discussed  Patient Instructions    1. Ultrasound showed sizable popliteal cyst with present of a joint body  2. Need to keep knee in hinge brace while awake  3. Ice your knee and a position of extension twice a day for 20 minutes  4. Referral to orthopedist  provided  5. Atenolol was called to [facility] pharmacy as [facility] is unable to obtain medication  6.  Stop tylenol with codeine  7.  Hydrocodone 5/ 325 mg;  1/2 tablet every 6 hours prn           Adult Advisor 2016.3 published by [employer].  Last reviewed: 2014-10-06     Index Hungarian Exercises   Torn Meniscus in Knee (Meniscal Tear)   What is a torn meniscus?  The meniscus is a piece of tissue in the middle of the knee that acts like a cushion between the surfaces of joints. You have a meniscus on the inner side of your knee and on the outer side of the knee. Each meniscus acts a cushion between the shinbone and the thighbone. They act as shock absorbers during weight-bearing activities, like walking, running, or jumping. If a meniscus tears, it can cause knee pain and limit movement of your knee.  What is the cause?   A meniscus can tear if the knee is forcefully twisted, like from a sudden stop and turn. Sometimes it happens from less forceful movement, like kneeling or squatting.  What are the symptoms?   Symptoms may include:    Pain in your knee joint    Swelling    Trouble bending or straightening your leg    A snapping or popping sound at the time of the injury  If you have had the tear for a while, it may give you pain on and off during activities, with or without swelling. Sometimes your knee may get stuck in one place. You may have stiffness in the knee.  How is it diagnosed?   Your healthcare provider will ask about your symptoms and how the injury happened. Your provider will examine your knee. Tests may include:    X-rays of the knee    MRI, which uses a strong magnetic field and radio waves to show detailed pictures of the knee  How is it treated?   Treatment depends on how bad the tear is.     You will need to change or stop doing the activities that cause pain until your knee has healed. For example, you may need to swim instead of run.    Your healthcare provider may recommend stretching and  strengthening exercises to help you heal.    Your provider may wrap an elastic bandage around your knee to keep the swelling from getting worse. You may need to keep your knee in a knee immobilizer or brace and use crutches to protect your knee while you heal.    Your provider may give you a shot of a steroid medicine. This will not fix the torn meniscus but may relieve your symptoms temporarily.    Sometimes arthroscopic surgery is needed to repair or remove large, torn pieces of tissue. This surgery is done with a small scope inserted into your joint. Your provider uses the scope to look directly at your joint. Your provider can put tools through the scope to do the surgery. Because the meniscus is an important shock absorber, your provider will leave as much of the healthy part of the meniscus in your knee as possible.  If you have a small tear that has not been repaired or removed, you may still be able to do your usual activities. However, your knee may sometimes swell, lock, be stiff, or hurt during activities.  How can I take care of myself?   To keep swelling down and help relieve pain for the first few days after the injury:    Put an ice pack, gel pack, or package of frozen vegetables wrapped in a cloth on your knee every 3 to 4 hours for up to 20 minutes at a time.    Keep your knee up on a pillow when you sit or lie down.    Take nonprescription pain medicine, such as acetaminophen, ibuprofen, or naproxen. Read the label and take as directed. Unless recommended by your healthcare provider, you should not take these medicines for more than 10 days.    Nonsteroidal anti-inflammatory medicines (NSAIDs), such as ibuprofen, naproxen, and aspirin, may cause stomach bleeding and other problems. These risks increase with age.    Acetaminophen may cause liver damage or other problems. Unless recommended by your provider, don't take more than 3000 milligrams (mg) in 24 hours. To make sure you don t take too much,  check other medicines you take to see if they also contain acetaminophen. Ask your provider if you need to avoid drinking alcohol while taking this medicine.  Follow your healthcare provider's instructions. Ask your provider:    How and when you will get your test results    How long it will take to recover    If there are activities you should avoid and when you can return to your normal activities    How to take care of yourself at home    What symptoms or problems you should watch for and what to do if you have them  Make sure you know when you should come back for a checkup. Keep all appointments for provider visits or tests.  How can I help prevent a meniscal tear?  Warm-up exercises and stretching before activities can help prevent injuries. For example, stretch your leg muscles and do exercises that build strong thigh and calf muscles.   Follow safety rules and use any protective equipment recommended for your work or sport. For example, if you ski, make sure your ski bindings are set correctly by a trained professional so that your skis will release if you fall.  Developed by [employer].  Adult Advisor 2016.3 published by [employer].  Last modified: 2016-03-23  Last reviewed: 2015-06-29  This content is reviewed periodically and is subject to change as new health information becomes available. The information is intended to inform and educate and is not a replacement for medical evaluation, advice, diagnosis or treatment by a healthcare professional.  References   Adult Advisor 2016.3 Index    Copyright   2016 [employer], a division of [employer] All rights reserved.          Index Tajik   Meniscal (Cartilage) Tear Exercises   Your healthcare provider may recommend exercises to help you heal. Talk to your healthcare provider or physical therapist about which exercises will best help you and how to do them correctly and safely.  You may do the first 7 exercises right away. You may do the  rest of the exercises when the pain in your knee has decreased.    Passive knee extension: Do this exercise if you are unable to extend your knee fully. While lying on your back, place a rolled-up towel under the heel of your injured leg so the heel is about 6 inches off the ground. Relax your leg muscles and let gravity slowly straighten your knee. Try to hold this position for 2 minutes. Repeat 3 times. You may feel some discomfort while doing this exercise. Do the exercise several times a day.   This exercise can also be done while sitting in a chair with your heel on another chair or stool.    Heel slide: Sit on a firm surface with your legs straight in front of you. Slowly slide the heel of the foot on your injured side toward your buttock by pulling your knee toward your chest as you slide the heel. Return to the starting position. Do 2 sets of 15.    Standing calf stretch: Stand facing a wall with your hands on the wall at about eye level. Keep your injured leg back with your heel on the floor. Keep the other leg forward with the knee bent. Turn your back foot slightly inward (as if you were pigeon-toed). Slowly lean into the wall until you feel a stretch in the back of your calf. Hold the stretch for 15 to 30 seconds. Return to the starting position. Repeat 3 times. Do this exercise several times each day.    Hamstring stretch on wall: Lie on your back with your buttocks close to a doorway. Stretch your uninjured leg straight out in front of you on the floor through the doorway. Raise your injured leg and rest it against the wall next to the door frame. Keep your leg as straight as possible. You should feel a stretch in the back of your thigh. Hold this position for 15 to 30 seconds. Repeat 3 times.    Straight leg raise: Lie on your back with your legs straight out in front of you. Bend the knee on your uninjured side and place the foot flat on the floor. Tighten the thigh muscle on your injured side and  lift your leg about 8 inches off the floor. Keep your leg straight and your thigh muscle tight. Slowly lower your leg back down to the floor. Do 2 sets of 15.    Prone hip extension: Lie on your stomach with your legs straight out behind you. Fold your arms under your head and rest your head on your arms. Draw your belly button in towards your spine and tighten your abdominal muscles. Tighten the buttocks and thigh muscles of the leg on your injured side and lift the leg off the floor about 8 inches. Keep your leg straight. Hold for 5 seconds. Then lower your leg and relax. Do 2 sets of 15.    Clam exercise: Lie on your uninjured side with your hips and knees bent and feet together. Slowly raise your top leg toward the ceiling while keeping your heels touching each other. Hold for 2 seconds and lower slowly. Do 2 sets of 15 repetitions.    Wall squat with a ball: Stand with your back, shoulders, and head against a wall. Look straight ahead. Keep your shoulders relaxed and your feet 3 feet (90 centimeters) from the wall and shoulder's width apart. Place a soccer or basketball-sized ball behind your back. Keeping your back against the wall, slowly squat down to a 45-degree angle. Your thighs will not yet be parallel to the floor. Try to keep your knees aligned over your second toe as you bend them. Hold this position for 10 seconds and then slowly slide back up the wall. Repeat 10 times. Build up to 2 sets of 15.    Step-up: Stand with the foot of your injured leg on a support 3 to 5 inches (8 to 13 centimeters) high --like a small step or block of wood. Keep your other foot flat on the floor. Shift your weight onto the injured leg on the support. Straighten your injured leg as the other leg comes off the floor. Return to the starting position by bending your injured leg and slowly lowering your uninjured leg back to the floor. Do 3 sets of 15.    Knee stabilization: Wrap a piece of elastic tubing around the ankle of  your uninjured leg. Tie a knot in the other end of the tubing and close it in a door at about ankle height.  1. Stand facing the door on the leg without tubing (your injured leg) and bend your knee slightly, keeping your thigh muscles tight. Stay in this position while you move the leg with the tubing (the uninjured leg) straight back behind you. Do 2 sets of 15.  2. Turn 90 degrees so the leg without tubing is closest to the door. Move the leg with tubing away from your body. Do 2 sets of 15.  3. Turn 90 degrees again so your back is to the door. Move the leg with tubing straight out in front of you. Do 2 sets of 15.  4. Turn your body 90 degrees again so the leg with tubing is closest to the door. Move the leg with tubing across your body. Do 2 sets of 15.  Hold onto a chair if you need help balancing. This exercise can be made more challenging by standing on a firm pillow or foam mat while you move the leg with tubing.    Resisted terminal knee extension: Make a loop with a piece of elastic tubing by tying a knot in both ends. Close the knot in a door at knee height. Step into the loop with your injured leg so the tubing is around the back of your knee. Lift the other foot off the ground and hold onto a chair for balance, if needed. Bend the knee with tubing about 45 degrees. Slowly straighten your leg, keeping your thigh muscle tight as you do this. Repeat 15 times. Do 2 sets of 15. If you need an easier way to do this, stand on both legs for better support while you do the exercise.  If you have access to a wobble board, do the following exercises:    Wobble board exercises  Stand on a wobble board with your feet shoulder-width apart.   1. Rock the board forwards and backwards 30 times, then side to side 30 times. Hold on to a chair if you need support.  2. Rotate the wobble board around so that the edge of the board is in contact with the floor at all times. Do this 30 times in a clockwise and then a  counterclockwise direction.  3. Balance on the wobble board for as long as you can without letting the edges touch the floor. Try to do this for 2 minutes without touching the floor.  4. Rotate the wobble board in clockwise and counterclockwise circles, but do not let the edge of the board touch the floor.  When you have mastered the wobble exercises standing on both legs, try repeating them while standing on just your injured leg. After you are able to do these exercises on one leg, try to do them with your eyes closed. Make sure you have something nearby to support you in case you lose your balance.  Developed by [employer].  Adult Advisor 2016.3 published by [employer].  Last modified: 2016-06-06  Last reviewed: 2015-01-12  This content is reviewed periodically and is subject to change as new health information becomes available. The information is intended to inform and educate and is not a replacement for medical evaluation, advice, diagnosis or treatment by a healthcare professional.  References   Adult Advisor 2016.3 Index    Copyright   2016 [employer], a division of [employer] All rights reserved.                 Electronically signed by Raisa Madrid MD

## 2017-12-28 NOTE — TELEPHONE ENCOUNTER
Patient Information     Patient Name MRN Milagros Duncan [id number] Female 1931      Telephone Encounter by Jordyn Nicole RN at 2017 10:26 AM     Author:  Jordyn Nicole RN Service:  (none) Author Type:  NURS- Registered Nurse     Filed:  2017 10:29 AM Encounter Date:  2017 Status:  Signed     :  Jordyn Nicole RN (NURS- Registered Nurse)            Daughter Ana called back stating there was an appointment made this afternoon with Karena Abbott.  She states her mom is suffering from chest congestion and does not think it's cardiac related.  She returned home this weekend and stated that pt complained of a cold and has a pretty bad cough.    This writer will follow up as needed.  Jordyn Nicole RN ....................  2017   10:29 AM

## 2017-12-28 NOTE — PROGRESS NOTES
"Patient Information     Patient Name MRN Sex Milagros Santos [id number] Female 1931      Progress Notes by Raisa Madrid MD at 2017  2:15 PM     Author:  Raisa Madrid MD Service:  (none) Author Type:  Physician     Filed:  6/15/2017  5:49 PM Encounter Date:  2017 Status:  Signed     :  Raisa Madrid MD (Physician)            Nursing Notes:   Lakisha Newberry  2017  2:47 PM  Signed  Patient presents to Establish care and INR patient   Lakisha MAU Newberry........................2017  2:14 PM     Subjective:  Milagros Peters is a 85 y.o. female who presents to establish care    Seasonal allergic rhinitis, unspecified allergic rhinitis trigger    patient has a history of seasonal allergies and takes Claritin as needed. Does not need refill at this time    . S/P AVR  patient is in need of enrollment to Coumadin clinic. She is on Coumadin for her aortic valve replacement.    HYPERTENSION  patient with a history of hyper-tension denies any chest pain palpitations or shortness of breath. States that she had a remote stroke mild     Hyperlipidemia, unspecified hyperlipidemia type  patient with history of hypercholesterolemia. She follows a general diet.  She reports having labs done prior to leaving [place]      BLINDNESS, RIGHT EYE   long standing       DNR no code (do not resuscitate)  patient requests that she have a do not resuscitate status. Discussion with patient and her granddaughter in regards to the do not resuscitate status that if her heart stopped that no medication nor chest compressions would be performed and she could die. Patient is able to verbalize understanding and wishes to have DNR status. She indicates\" I know I'm old and I'm ready to die if the opportunity presents itself\" .       Allergies: reviewed in EMR  No current outpatient prescriptions on file prior to visit.     No current facility-administered medications on file prior to " "visit.      Patient Active Problem List     Diagnosis  Code     BLINDNESS, RIGHT EYE H54.40     ANTICOAGULATION RX Z79.01     HYPERTENSION I10     Hyperlipidemia E78.5     AORTIC VALVE REPLACEMENT, HX OF Z95.2     DNR no code (do not resuscitate) Z66     S/P AVR Z95.2     Anticoagulation monitoring, INR range 2-3 [Z79.01] Z79.01         Social Hx:  Social History     Substance Use Topics       Smoking status: Never Smoker     Smokeless tobacco: Never Used     Alcohol use No     Social History Narrative    [place]  Is preferred name.         Okay to talk to Sarah Suresh  [phone] in a medical emergency;  Daughter         Moved to [place].  Lives with daughter in [facility].   [place] best [place].   [employer];   [employer] [address] [place] in [facility] ;  Worked at grocery, telephone company store.        [place] [employer], [place]  Approximately a month           1952;  Ran numbers          ~2 sons in [place]    daughter in the area~             Family Hx:   Family History       Problem   Relation Age of Onset     Cancer  Mother      Asthma  Mother      Heart Disease  Father      heart problems         Objective:  /82  Pulse 60  Ht 1.53 m (5' 0.24\")  Wt 64.9 kg (143 lb)  LMP  (LMP Unknown)  Breastfeeding? No  BMI 27.71 kg/m2   she is alert oriented ×3 judgment and insight are intact Violeta EOMI she has decreased vision in her right eye on confrontation. TMs are clear minimal earwax. Oropharynx with postnasal drip changes at the supple without adenopathy lungs clear to auscultation without wheezes rhonchi or rales heart sounds S1 and S2 she is a soft 1/6 systolic ejection murmur at the left upper sternal border. Abdomen is obese soft nontender extremities are without edema. Patient does require assistance to get up onto table and down from table. She has decreased internal/external rotation of her hips bilaterally though mild. Full range of " motion of her knees and ankles. She has osteoarthritis changes noted of her knees. Skin is intact. Neuro exam cranial nerves II through XII are intact. She has wide gait.     Results for orders placed or performed in visit on 06/14/17      PROTIME-INR      Result  Value Ref Range    INR 2.0 (H) <1.3    PROTIME 21.9 (H) 11.9 - 15.2 sec         Assessment:    ICD-10-CM    1. S/P AVR Z95.2 INR,POCT      AMB CONSULT TO MEDICATION CLINIC      PROTIME-INR      PROTIME-INR      AMB CONSULT TO ANTICOAGULATION CLINIC   2. Seasonal allergic rhinitis, unspecified allergic rhinitis trigger J30.2 loratadine (CLARITIN) 10 mg tablet   3. HYPERTENSION I10    4. Hyperlipidemia, unspecified hyperlipidemia type E78.5    5. BLINDNESS, RIGHT EYE H54.40    6. DNR no code (do not resuscitate) Z66      Ms. Peters's Body mass index is 27.71 kg/(m^2). This is out of the normal range for a 85 y.o. Normal range for ages 18+ is between 18.5 and 24.9. To lose weight we reviewed risks and benefits of appropriate options such as diet, exercise, and medications. Patient's strategy will be  self-directed nutrition plan      Take extra dose of 2.5 mg coumadin today.    Enroll in coumadin clinic   Continue current meds  Await outside records.        I spent approximately 50 minutes with the patient (exclusive of separately billed services/procedures), with greater than 50% spent in counseling, prognosis, risks and benefits of management or follow-up.  Reviewed importance of compliance with chosen treatment options and follow-up.  Risk factor reduction and patient education and coordinating care, establishing and/or reviewing the patient's medical record also completed during today's exam .      Plan:   -- Expected clinical course discussed   -- Medications and their side effects discussed  Patient Instructions    1.   Enrolled in the coumadin clinic;  Goal INR 2-3    2.  Recommend use of cane or walker  for balance   3.  Will try to obtain outside  records.     4.  INR today .   5.  Take extra dose of coumadin 2.5 mg along with 5 mg;  Recheck INR in a week.   6.  Handouts on falls / PALS provided                  Electronically signed by Raisa Madrid MD

## 2017-12-28 NOTE — TELEPHONE ENCOUNTER
Patient Information     Patient Name MRN Sex Milagros Santos [id number] Female 1931      Telephone Encounter by Malia Cerrato RN at 2017 10:40 AM     Author:  Malia Cerrato RN Service:  (none) Author Type:  NURS- Registered Nurse     Filed:  2017 10:43 AM Encounter Date:  2017 Status:  Signed     :  Malia Cerrato RN (NURS- Registered Nurse)            Beta Blockers     Office visit in the past 12 months or per provider note.    Last visit with LION CARLSON was on: 2017 in [facility]  Next visit with LION CARLSON is on: No future appointment listed with this provider  Next visit with Family Practice is on: No future appointment listed in this department    Max refill for 12 months from last office visit or per provider note.    Prescription refilled per RN Medication Refill Policy.................... Malia Cerrato RN ....................  2017   10:42 AM

## 2017-12-28 NOTE — TELEPHONE ENCOUNTER
Patient Information     Patient Name MRN Milagros Duncan [id number] Female 1931      Telephone Encounter by Lakisha Newberry at 2017  9:11 AM     Author:  Lakisha Newberry Service:  (none) Author Type:  (none)     Filed:  2017  9:15 AM Encounter Date:  2017 Status:  Signed     :  Lakisha Newberry            Stayed late after work last night to verify with Raisa Madrid MD that patient had instructed her to call her daughter with results in the event that she could not reach the patient.  It was apparent that the daughter and patient were definitely not getting along and have since decided to no have us contact daughter anymore from here on forward.  As the patient is able to care for herself with out the help of her daughter.   Lakisha Newberry LPN........................2017  9:14 AM

## 2017-12-28 NOTE — TELEPHONE ENCOUNTER
Patient Information     Patient Name MRN Milagros Duncan [id number] Female 1931      Telephone Encounter by Ana Montes MD at 2017 10:06 AM     Author:  Ana Montes MD Service:  (none) Author Type:  Physician     Filed:  2017 10:08 AM Encounter Date:  2017 Status:  Signed     :  Ana Montes MD (Physician)            Prescription for chlorthalidone 25 mg take 1 daily sent to pharmacy with #14 tablets until she is seen for reassessment.  Ana Montes MD  10:07 AM 2017

## 2017-12-28 NOTE — TELEPHONE ENCOUNTER
"Patient Information     Patient Name MRN Milagros Duncan [id number] Female 1931      Telephone Encounter by Jordyn Nicole RN at 2017  9:18 AM     Author:  Jordyn Nicole RN Service:  (none) Author Type:  NURS- Registered Nurse     Filed:  2017  9:51 AM Encounter Date:  2017 Status:  Signed     :  Jordyn Nicole RN (NURS- Registered Nurse)            Pt calls today complaining of chest pain and shortness of breath.  She states it hurts right down the middle.  She was unable to verbalize if the pain was radiating stating \"I don't know, something is happening\".  When asked if the pain was constant, pt stated \"It's there, I don't know how to explain it\".  Pt was out of breath while talking to me and stated she was leaning on the table talking on the phone.  Pt was a bit confused.  When I instructed her to call 911 she stated she needed to run upstairs and get a bra on, at this time I suggested waiting until someone was there to help her.  When I stated to stay on the table if that is helping her she asked how I knew she was leaning on the table.   Per protocol pt should present to the ED with chest pain and shortness of breath.  Pt refused to call the ED stating she wanted to call family instead.  Concerned that pt would not make the appropriate calls I offered to call pt's daughter.  I put the pt on hold and I was able to leave a message on Daughter Marlene's cell phone and then called Guerita, who in turn called Marlene's work phone and explained what was happening.  PT's granddaughter Guerita stated they would call Milagros and take the neccessary action.  Granddaughter stated Marlene would call the clinic.  This writer will follow up as needed.    Jordyn Nicole RN ....................  2017   9:48 AM      Answer Assessment - Initial Assessment Questions  1. LOCATION: \"Where does it hurt?\"        Right down the middle  2. RADIATION: \"Does the pain go anywhere else?\" (e.g., into " "neck, jaw, arms, back)      \"Pt just doesn't know anymore\"  3. ONSET: \"When did the chest pain begin?\" (Minutes, hours or days)       \"Maybe so, I don't know\" \"Something is happening\"  4. PATTERN \"Does the pain come and go, or has it been constant since it started?\"  \"Does it get worse with exertion?\"       Constant, doesn't know how to explain it.    5. DURATION: \"How long does it last\" (e.g., seconds, minutes, hours)      Pt states she can't explain what's going on.    6. SEVERITY: \"How bad is the pain?\"  (e.g., Scale 1-10; mild, moderate, or severe)     - MILD (1-3): doesn't interfere with normal activities      - MODERATE (4-7): interferes with normal activities or awakens from sleep     - SEVERE (8-10): excruciating pain, unable to do any normal activities        \"I can't tell you, can't explain it\"  7. CARDIAC RISK FACTORS: \"Do you have any history of heart problems or risk factors for heart disease?\" (e.g., prior heart attack, angina; high blood pressure, diabetes, being overweight, high cholesterol, smoking, or strong family history of heart disease)      Aortic valve put in \"That one time\"  8. PULMONARY RISK FACTORS: \"Do you have any history of lung disease?\"  (e.g., blood clots in lung, asthma, emphysema, birth control pills)      Pt couldn't recall.    9. CAUSE: \"What do you think is causing the chest pain?\"      \"I don't know\"  10. OTHER SYMPTOMS: \"Do you have any other symptoms?\" (e.g., dizziness, nausea, vomiting, sweating, fever, difficulty breathing, cough)        Hard time breathing, Pt is leaning on table.  I could hear pt's difficulty breathing  11. PREGNANCY: \"Is there any chance you are pregnant?\" \"When was your last menstrual period?\"        NA    Protocols used: ADULT CHEST PAIN-A-AH            "

## 2017-12-28 NOTE — TELEPHONE ENCOUNTER
Patient Information     Patient Name MRMilagros Foley [id number] Female 1931      Telephone Encounter by Lakisha Mccullough at 2017  4:01 PM     Author:  Lakisha Mccullough Service:  (none) Author Type:  (none)     Filed:  2017  4:16 PM Encounter Date:  2017 Status:  Signed     :  Lakisha Mccullough            Patient called she is very upset. Someone called her yesterday however they didn't talk with her talked with someone at the house and they took a note.  She is very upset that this happened. States this is not ok with her. That it is her body and they need to talk to her. She wants to know what she has to do to make sure this doesn't happen again.  Message brought to Supervisor attention to have above issue addressed.     She wants to talk with Raisa Madrid MD, she only trust Dr Madrid.  States that she knows why she had swelling and when she wants to tell us why she will. But she won't be elevating her feet twice a day for 30min. Also that she didn't hear about her EKG.     Nickname Garland Mccullough LPN ..........2017 4:16 PM

## 2017-12-28 NOTE — TELEPHONE ENCOUNTER
Patient Information     Patient Name MRN Milagros Duncan [id number] Female 1931      Telephone Encounter by Leena Ramirez at 2017  8:47 AM     Author:  Leena Ramirez Service:  (none) Author Type:  (none)     Filed:  2017  8:48 AM Encounter Date:  2017 Status:  Signed     :  Leena Ramirez            Left message for daughter Marlene to return call.  Leena Ramirez LPN............................... 2017 8:47 AM

## 2017-12-29 NOTE — PATIENT INSTRUCTIONS
Patient Information     Patient Name MRN Milagros Duncan [id number] Female 1931      Patient Instructions by Arabella Longo RN at 2017  3:30 PM     Author:  Arabella Longo RN  Service:  (none) Author Type:  NURS- Registered Nurse     Filed:  2017  3:53 PM  Encounter Date:  2017 Status:  Addendum     :  Arabella Longo RN (NURS- Registered Nurse)        Related Notes: Original Note by Arabella Longo RN (NURS- Registered Nurse) filed at 2017  3:53 PM            2017 Details    Sun Mon Tue Wed Thu Fri Sat         1               2               3                 4               5               6               7               8               9               10                 11               12               13               14               15               16               17                 18               19               20               21               22               23               24                 25               26               27               28      5 mg   See details      29      5 mg         30      2.5 mg           Date Details    This INR check               How to take your warfarin dose     To take:  2.5 mg Take one of the 2.5 mg tablets.    To take:  5 mg Take one of the 5 mg tablets.           2017 Details    Sun Mon Tue Wed Thu Fri Sat           1      5 mg           2      5 mg         3      2.5 mg         4      5 mg         5      5 mg         6      5 mg         7      2.5 mg         8      5 mg           9      5 mg         10      2.5 mg         11      5 mg         12      5 mg         13      5 mg         14      2.5 mg         15                 16               17               18               19               20               21               22                 23               24               25               26               27               28               29                 30               31                      Date Details   No additional details    Date of next INR:  7/14/2017         How to take your warfarin dose     To take:  2.5 mg Take one of the 2.5 mg tablets.    To take:  5 mg Take one of the 5 mg tablets.             Description          Take 2.5 mg x 2 days and 5 mg x 5 days. Recheck in 2 weeks. Arabella Longo RN    6/28/2017  3:49 PM          Anticoagulation Summary as of 6/28/2017     INR goal 2.5-3.5    Today's INR 3.1    Next INR check 7/14/2017          Call your Anticoagulation Clinic at Dept: [phone]   if:   1. Any medications are started, stopped, or there is a change in dose.  2. You experience any bleeding that is not easily stopped or if it is recurrent.  3. You notice an increase in bruising or any bruising that does not heal.  4. You are scheduled for surgery, colonoscopy, dental extraction or any other procedure where you may need to stop your Coumadin (warfarin).

## 2017-12-29 NOTE — PATIENT INSTRUCTIONS
Patient Information     Patient Name MRN Milagros Duncan [id number] Female 1931      Patient Instructions by Arabella Longo RN at 2017  3:45 PM     Author:  Arabella Longo RN Service:  (none) Author Type:  NURS- Registered Nurse     Filed:  2017  3:41 PM Encounter Date:  2017 Status:  Signed     :  Arabella Longo RN (NURS- Registered Nurse)            2017 Details    Sun  Fri Sat          1               2                 3               4               5               6               7               8               9                 10               11               12      Hold   See details      13      2.5 mg         14      5 mg         15      2.5 mg         16      5 mg           17      5 mg         18      2.5 mg         19      5 mg         20               21               22               23                 24               25               26               27               28               29               30                Date Details    This INR check       Date of next INR:  2017         How to take your warfarin dose     To take:  2.5 mg Take one of the 2.5 mg tablets.    To take:  2.5 mg Take half of a 5 mg tablet.    To take:  5 mg Take one of the 5 mg tablets.    Hold Do not take your warfarin dose. The details table to the right may include additional information.                  Description          Hold dose today and decrease recheck INR in 1 week. Arabella Longo RN    2017  3:40 PM                Anticoagulation Summary as of 2017     INR goal 2.5-3.5    Today's INR 4.7    Next INR check 2017          Call your Anticoagulation Clinic at Dept: [phone]   if:   1. Any medications are started, stopped, or there is a change in dose.  2. You experience any bleeding that is not easily stopped or if it is recurrent.  3. You notice an increase in bruising or any bruising that does not heal.  4. You  are scheduled for surgery, colonoscopy, dental extraction or any other procedure where you may need to stop your Coumadin (warfarin).

## 2017-12-29 NOTE — PATIENT INSTRUCTIONS
Patient Information     Patient Name MRN Milagros Duncan [id number] Female 1931      Patient Instructions by Arabella Longo RN at 2017  2:15 PM     Author:  Arabella Longo RN Service:  (none) Author Type:  NURS- Registered Nurse     Filed:  2017  2:19 PM Encounter Date:  2017 Status:  Signed     :  Arabella Longo RN (NURS- Registered Nurse)            2017 Details    Sun Mon Tue Wed Thu Fri Sat          1               2                 3               4               5               6               7               8               9                 10               11               12               13               14               15               16                 17               18               19      5 mg   See details      20      2.5 mg         21      5 mg         22      2.5 mg         23      5 mg           24      5 mg         25      2.5 mg         26      5 mg         27      2.5 mg         28      5 mg         29      2.5 mg         30      5 mg          Date Details    This INR check               How to take your warfarin dose     To take:  2.5 mg Take one of the 2.5 mg tablets.    To take:  2.5 mg Take half of a 5 mg tablet.    To take:  5 mg Take one of the 5 mg tablets.           2017 Details    Sun Mon Tue Wed Thu Fri Sat     1      5 mg         2      2.5 mg         3      5 mg         4               5               6               7                 8               9               10               11               12               13               14                 15               16               17               18               19               20               21                 22               23               24               25               26               27               28                 29               30               31                    Date Details   No additional details    Date of next INR:  10/3/2017          How to take your warfarin dose     To take:  2.5 mg Take one of the 2.5 mg tablets.    To take:  5 mg Take one of the 5 mg tablets.             Description          Continue same dose and recheck in 2-3 weeks. ..............Arabella Longo RN   9/19/2017    2:19 PM                Anticoagulation Summary as of 9/19/2017     INR goal 2.5-3.5    Today's INR 3.5    Next INR check 10/3/2017          Call your Anticoagulation Clinic at Dept: [phone]   if:   1. Any medications are started, stopped, or there is a change in dose.  2. You experience any bleeding that is not easily stopped or if it is recurrent.  3. You notice an increase in bruising or any bruising that does not heal.  4. You are scheduled for surgery, colonoscopy, dental extraction or any other procedure where you may need to stop your Coumadin (warfarin).

## 2017-12-29 NOTE — PATIENT INSTRUCTIONS
Patient Information     Patient Name MRN Milagros Duncan [id number] Female 1931      Patient Instructions by Arabella Longo RN at 2017  4:00 PM     Author:  Arabella Longo RN Service:  (none) Author Type:  NURS- Registered Nurse     Filed:  2017  3:44 PM Encounter Date:  2017 Status:  Signed     :  Arabella Longo RN (NURS- Registered Nurse)            2017 Details    Sun Mon Tue Wed Thu Fri Sat           1                 2               3               4               5               6               7               8                 9               10               11               12               13               14      2.5 mg   See details      15      5 mg           16      5 mg         17      2.5 mg         18      5 mg         19      5 mg         20      5 mg         21      2.5 mg         22      5 mg           23      5 mg         24      2.5 mg         25      5 mg         26      5 mg         27      5 mg         28      2.5 mg         29      5 mg           30      5 mg         31      2.5 mg               Date Details    This INR check               How to take your warfarin dose     To take:  2.5 mg Take one of the 2.5 mg tablets.    To take:  5 mg Take one of the 5 mg tablets.           2017 Details    Sun Mon Tue Wed Thu Fri Sat       1      5 mg         2      5 mg         3      5 mg         4      2.5 mg         5      5 mg           6      5 mg         7      2.5 mg         8      5 mg         9      5 mg         10      5 mg         11      2.5 mg         12                 13               14               15               16               17               18               19                 20               21               22               23               24               25               26                 27               28               29               30               31                  Date Details   No additional details     Date of next INR:  8/11/2017         How to take your warfarin dose     To take:  2.5 mg Take one of the 2.5 mg tablets.    To take:  5 mg Take one of the 5 mg tablets.             Description          New to [facility]. Continue same Warfarin dose and recheck in 1 month.  Arabella Longo RN   7/14/2017    3:43 PM        Anticoagulation Summary as of 7/14/2017     INR goal 2.5-3.5    Today's INR 3.3    Next INR check 8/11/2017          Call your Anticoagulation Clinic at Dept: [phone]   if:   1. Any medications are started, stopped, or there is a change in dose.  2. You experience any bleeding that is not easily stopped or if it is recurrent.  3. You notice an increase in bruising or any bruising that does not heal.  4. You are scheduled for surgery, colonoscopy, dental extraction or any other procedure where you may need to stop your Coumadin (warfarin).

## 2017-12-29 NOTE — PATIENT INSTRUCTIONS
Patient Information     Patient Name MRN Sex Milagros Santos [id number] Female 1931      Patient Instructions by Malia Anaya RN at 2017  3:45 PM     Author:  Malia Anaya RN Service:  (none) Author Type:  NURS- Registered Nurse     Filed:  2017  4:30 PM Encounter Date:  2017 Status:  Signed     :  Malia Anaya RN (NURS- Registered Nurse)              1.  An echocardiogram has been ordered.  Radiology scheduling will contact you to schedule this appointment and explain the process.    2.  Start lisinopril 10 mg, once per day.     3.  Please follow-up with Nicole Lee NP in 2-3 weeks

## 2017-12-29 NOTE — PATIENT INSTRUCTIONS
Patient Information     Patient Name MRN Milagros Duncan [id number] Female 1931      Patient Instructions by Arabella Longo RN at 10/23/2017 11:00 AM     Author:  Arabella Longo RN Service:  (none) Author Type:  NURS- Registered Nurse     Filed:  10/23/2017 10:56 AM Encounter Date:  10/23/2017 Status:  Signed     :  Arabella Longo RN (NURS- Registered Nurse)            2017 Details    Sun Mon Tue Wed Thu Fri Sat     1               2               3               4               5               6               7                 8               9               10               11               12               13               14                 15               16               17               18               19               20               21                 22               23      2.5 mg   See details      24      5 mg         25      2.5 mg         26      5 mg         27      2.5 mg         28      2.5 mg           29      5 mg         30      2.5 mg         31      5 mg              Date Details   10/23 This INR check               How to take your warfarin dose     To take:  2.5 mg Take one of the 2.5 mg tablets.    To take:  2.5 mg Take half of a 5 mg tablet.    To take:  5 mg Take one of the 5 mg tablets.           2017 Details    Sun Mon Tue Wed Thu Fri Sat        1      2.5 mg         2      5 mg         3      2.5 mg         4      2.5 mg           5      5 mg         6      2.5 mg         7      5 mg         8      2.5 mg         9      5 mg         10      2.5 mg         11      2.5 mg           12      5 mg         13      2.5 mg         14      5 mg         15      2.5 mg         16      5 mg         17      2.5 mg         18      2.5 mg           19      5 mg         20      2.5 mg         21               22               23               24               25                 26               27               28               29               30                   Date Details   No additional details    Date of next INR:  11/20/2017         How to take your warfarin dose     To take:  2.5 mg Take one of the 2.5 mg tablets.    To take:  2.5 mg Take half of a 5 mg tablet.    To take:  5 mg Take one of the 5 mg tablets.             Description          Continue same Warfarin dose and recheck in 1 month.  Arabella Longo RN   10/23/2017    10:56 AM                    Anticoagulation Summary as of 10/23/2017     INR goal 2.5-3.5    Today's INR 3.3    Next INR check 11/20/2017          Call your Anticoagulation Clinic at Dept: [phone]   if:   1. Any medications are started, stopped, or there is a change in dose.  2. You experience any bleeding that is not easily stopped or if it is recurrent.  3. You notice an increase in bruising or any bruising that does not heal.  4. You are scheduled for surgery, colonoscopy, dental extraction or any other procedure where you may need to stop your Coumadin (warfarin).

## 2017-12-29 NOTE — PATIENT INSTRUCTIONS
Patient Information     Patient Name MRN Milagros Duncan [id number] Female 1931      Patient Instructions by Raisa Madrid MD at 2017 10:55 AM     Author:  Raisa Madrid MD  Service:  (none) Author Type:  Physician     Filed:  2017  6:34 PM  Encounter Date:  2017 Status:  Addendum     :  Raisa Madrid MD (Physician)        Related Notes: Original Note by Raisa Madrid MD (Physician) filed at 2017 10:58 AM              1.  normal labs prn chlorthalidone which is a diuretic will be refilled to be used as needed for swelling  2. Elevate your feet above your heart twice a day for 30 minutes  3. Considered support stockings are available at local pharmacies or at [employer] stores  4. starting trazodone to help you sleep initially at 50 mg by mouth daily at bedtime when necessary.  5. You will be called with the results of your labs as well as letter sent  6.  If urinalysis shows you have infection antibiotic will be called to your pharmacy   7.  If you develop a dry persistent cough on lisinopril, then call MD.      Index   Edema   ________________________________________________________________________  TONEY POINTS    Edema is swelling in part of your body because of fluid that gets into the tissue. Edema can happen if you sit for a long time, or it can be a symptom of a disease or injury, or be caused by pregnancy or eating too much salt.    Treatment may include taking diuretic medicine, using support stockings, or eating less salt.    It may help if you raise your legs when you sit and avoid sitting or standing for long periods of time.  ________________________________________________________________________  What is edema?   Edema is swelling in part of your body because of fluid that gets into the tissue. It most commonly happens in the feet, ankles, or legs but may be in other parts of your body, such as your hands or  face.   What is the cause?   Edema can happen if you sit for a long time or it can be a symptom of a disease or an injury. Some things that can cause fluid to build up include:    Heart, kidney, thyroid, or liver disease    Blood vessel problems, especially in the legs    Injury to part of the body    Burns, including sunburn    Allergies to foods, medicine, or insect stings    Side effects of some medicines  Edema can also be normal if you:    Eat too much salt, which causes your body to hold fluid    Are pregnant    Stand or walk a lot when the weather is warm    Sitting for a long time like when on a long flight  What are the symptoms?  Symptoms may include:     Swelling in one or more parts of the body, usually painless, but which can hurt if severe    Puffy or shiny skin  Edema may be mild to severe. Mild edema may only cause a feeling of tightness in your hands or feet. With severe edema, pressing on an area of swelling could leave a dent or a pit in your skin that lasts several minutes or longer.  How is it diagnosed?  Your healthcare provider will ask about your symptoms and medical history and examine you. You may have tests or scans to check for possible causes of your edema.  How is it treated?   Treatment depends on the cause. Edema of the feet and legs caused by walking a lot in warm weather or by pregnancy may not need treatment. Treatment for some causes of edema may include:    Taking a water pill (diuretic)    Using support stockings    Eating less salt  How can I take care of myself?   It may help if you:     Raise your legs when you sit.    Do not cross your legs or ankles when you sit.    Avoid sitting or standing for long periods of time.    Weigh yourself as often as recommended by your healthcare provider. Write down your weight and tell your healthcare provider as soon as possible if you are starting to gain weight.  Follow the full course of treatment prescribed by your healthcare provider.  Ask your provider:    How and when you will get your test results.    How long it will take to recover.    If there are activities you should avoid and when you can return to normal activities.    How to take care of yourself at home, including which foods to avoid.    What symptoms or problems you should watch for and what to do if you have them.  Make sure you know when you should come back for a checkup. Keep all appointments for provider visits or tests.  A healthy lifestyle may also help:    Eat a healthy diet.    Try to keep a healthy weight. If you are overweight, lose weight.    Stay fit with the right kind of exercise for you as advised by your healthcare provider.    Limit caffeine.    If you smoke, try to quit. Talk to your healthcare provider about ways to quit smoking.    If you want to drink alcohol, ask your healthcare provider how much is safe for you to drink.    Try to get at least 7 to 9 hours of sleep each night.  Developed by [employer].  Adult Advisor 2016.3 published by [employer].  Last modified: 2016-03-23  Last reviewed: 2015-04-28  This content is reviewed periodically and is subject to change as new health information becomes available. The information is intended to inform and educate and is not a replacement for medical evaluation, advice, diagnosis or treatment by a healthcare professional.  References   Adult Advisor 2016.3 Index    Copyright   2016 [employer], a division of [employer] All rights reserved.        Index Divehi   Diuretics   ________________________________________________________________________  KEY POINTS    Diuretics are medicines used to get rid of extra water and salt through your urine.    Make sure you know how and when to take your medicine. Do not take more or less than you are supposed to take.    Ask your healthcare provider or pharmacist what side effects the medicine may cause and what you should do if you have side  effects.  ________________________________________________________________________  What are diuretics used for?  Diuretic medicine helps your body get rid of extra water and salt through your urine. Diuretics are also called water pills. They may be used alone or with other medicines to treat:    High blood pressure    Some types of heart disease, such as heart failure    Severe swelling in the legs or feet    Glaucoma, which is caused by fluid buildup in the eye    Some types of kidney and liver disease  These medicines may be used for other conditions as determined by your healthcare provider.  There are several different diuretics. Which diuretic is best for you depends on your condition and health.  How do they work?  Diuretics work in the kidneys to help your body get rid of salt (sodium) and extra water. Removing extra water helps your blood pressure go down. When blood pressure is lower, the heart doesn t have to work as hard to pump blood to the rest of the body. Diuretics also reduce the symptoms of fluid buildup. This means less swelling in your legs or belly, and less shortness of breath.  What else do I need to know about this medicine?    Follow the directions that come with your medicine, including information about food or alcohol. Make sure you know how and when to take your medicine. Do not take more or less than you are supposed to take.    Many medicines have side effects. A side effect is a symptom or problem that is caused by the medicine. Ask your healthcare provider or pharmacist what side effects the medicine may cause and what you should do if you have side effects.    Diuretics may make you urinate more often. Talk to your healthcare provider about when the best time is to take your medicine so that you do not have to get up to urinate when you are sleeping.    Some diuretics change the way your body uses potassium. Ask your healthcare provider about this and if your medicine is one of  these. You may need blood tests to check your potassium level.    Diuretics may make it easier for you to lose too much fluid and get dehydrated. Talk to your healthcare provider about the precautions that you should take while being treated with this medicine.    Talk to your healthcare provider if you need to change how much you eat or drink, such as before a test or a procedure. Ask if you need to change the way you take your medicines that day.    Try to get all of your prescriptions filled at the same place. Your pharmacist can help make sure that all of your medicines are safe to take together.    Keep a list of your medicines with you. List all of the prescription medicines, nonprescription medicines, supplements, natural remedies, and vitamins that you take. Tell all healthcare providers who treat you about all of the products you are taking.  If you have any questions, ask your healthcare provider or pharmacist for more information. Be sure to keep all appointments for provider visits or tests.  Developed by [employer].  Adult Advisor 2016.3 published by [employer].  Last modified: 2016-04-18  Last reviewed: 2016-04-15  This content is reviewed periodically and is subject to change as new health information becomes available. The information is intended to inform and educate and is not a replacement for medical evaluation, advice, diagnosis or treatment by a healthcare professional.  References   Adult Advisor 2016.3 Index    Copyright   2016 [employer], a division of [employer] All rights reserved.       [facility]: Sleep Hygiene     General Information  Bedtime hygiene often consists of washing your face and brushing your teeth. Sleep hygiene refers to the sleep habits that you develop over a period of time.  Good sleep habits promote restful sleep and daytime alertness. They can also prevent the development of sleep problems and disorders.  Good Sleep Hygiene  You can create good  sleep hygiene by doing the following.           Get regular exercise, but not right before bed. Regular exercise, especially in the afternoon, can help deepen your sleep. Strenuous (heavy) exercise within 4 hours before bedtime can decrease your ability to fall asleep because your body is too stimulated.           Find a good temperature for sleeping. A bedroom that is too cold or too hot can keep you awake. A cool bedroom is often the best temperature for sleeping.           Go to bed only when you are tired and get into your favorite sleeping position. If you can t fall asleep right away, leave the room and find something quiet to do (such as reading). When you are tired, go back to bed and try to fall asleep.           Go to bed and wake up at the same time every day. Your body works on a rhythm, which means it needs to go to sleep and wake up at the same time every day. Get up when your alarm goes off. Avoid hitting the snooze button. Hitting the snooze button many times causes you to feel more tired than if you just get up, even on weekends or vacation. Keeping a regular schedule during the entire week helps your body establish on a normal pattern, and makes you feel more alert.           Avoid taking naps during the day. A daytime nap might make you feel better at the time, but it can cause problems for nighttime sleep. If you do nap, limit the time to one nap of less than 1 hour. Do not nap later than 3 p.m.           Avoid eating a heavy meal or spicy foods before bedtime. If you are hungry at bedtime, eat a light snack (such as a glass of warm milk or cheese and crackers).           Avoid drinking alcohol 4 to 6 hours before bedtime. Alcohol can make you feel sleepy right after drinking it, but a few hours later when the alcohol levels in your blood start to fall, your body becomes alert, or stimulated.           Avoid drinking/eating caffeine . Caffeine is a stimulant. Beverages such as coffee, tea and  many sodas, and food such as chocolate, make your body more alert. Caffeine may not only affect how quickly you fall asleep, but it also affects the quality of your sleep.           Avoid nicotine before bed. Nicotine is a stimulant and can make it harder for you to fall asleep. Nicotine withdrawal during sleep can disrupt healthy sleep patterns. Quitting smoking or cutting down on tobacco can help you fall asleep easier and prevent waking up fewer times each night.           Use your bed only for sleep and sex. Let your body  know  that the bed is for sleeping.           Avoid noise and bright rooms. Distractions can keep you from relaxing. Consider putting up darkening shades on the windows, wearing earplugs, or using a white noise machine if you live in a noisy neighborhood.           Avoid watching the clock. Lying in bed unable to sleep and getting frustrated makes matters worse. If you keep looking at your clock, move it out of your bedroom or turn it around.           Don t take your worries to bed. Try to find time to think and sort out problems earlier in the day. Keep a journal and write down your problems on a list. Star the more serious ones and work on how you will resolve them -- during the day.           If you think you may have depression, anxiety or excessive stress, talk with your health care provider. Problems getting to sleep or staying asleep can be signs of depression or anxiety.           If you take medicine, ask your health care provider or pharmacist if the medicine may be causing sleep problems.  Getting Up in the Middle of the Night  Many people wake up at night for various reasons. If you need to get up to use the bathroom, try to use a night light to see, instead of turning on a main light. Bright lights can stimulate your body and may keep you from falling asleep.  If you get up in the middle of the night and can t get back to sleep, do not stay in bed. Leave the bedroom and do a  quiet activity (such as reading). Do not do office work, housework or watch television. When you are tired, lie down again and you should be able to get back to sleep in about 20 minutes.  Television  Many people have televisions in their bedrooms and fall asleep with the TV on. Watching TV before bed isn t a good idea because it can keep you up. It is not recommended that you have a TV in the bedroom. Having a radio on at bedtime (or a white noise machine) is a better option than TV.  If you have any questions or concerns about your sleep patterns, talk with your health care provider.  Statistics adapted from the American Academy of Sleep Medicine.    2014 [employer]. TM - A [employer]EMARK OF [employer]  OTHER TRADEMARKS USED ARE OWNED BY THEIR RESPECTIVE OWNERS  THIS FACT SHEET DOES NOT REPLACE MEDICAL OR PROFESSIONAL ADVICE; IT IS ONLY A GUIDE  [facility]-[id number] (10/04)       Index [place]   Trazodone, Oral   TRAZ-oh-done  ________________________________________________________________________  KEY POINTS    This medicine is taken by mouth to treat depression. Take it exactly as directed.    This medicine may increase suicidal thoughts or actions in some people.    This medicine may cause unwanted side effects. Tell your healthcare provider if you have any side effects that are serious, continue, or get worse.    This medicine may cause life-threatening problems if you take it with certain other medicines. Tell all healthcare providers who treat you about all the prescription medicines, nonprescription medicines, supplements, natural remedies, and vitamins that you take.  ________________________________________________________________________  What are other names for this medicine?   Type of medicine: antidepressant  Generic and brand names: trazodone, oral  What is this medicine used for?  This medicine is taken by mouth to treat depression.  This medicine may be used to treat other conditions as  determined by your healthcare provider.  What should my healthcare provider know before I take this medicine?   Before taking this medicine, tell your healthcare provider if you have ever had:    An allergic reaction to any medicine    Glaucoma    Heart disease or a heart attack    High or low blood pressure    Irregular heartbeat    Liver or kidney disease    Long QT syndrome (problems with electrical activity in the heart muscle)    Mental health problems such as bipolar disorder, paranoia, or schizophrenia    Low levels of potassium, magnesium, or sodium in your blood    Seizures    Thoughts of suicide  Do not take this medicine if you have taken an MAO inhibitor such as isocarboxazid (Marplan), phenelzine (Nardil), selegiline, or tranylcypromine (Parnate) within the last 14 days.  Females of childbearing age: Tell your healthcare provider if you are pregnant or plan to become pregnant. It is not known whether this medicine will harm an unborn baby. Do not breast-feed while taking this medicine without your healthcare provider's approval.  How do I take it?   Read the Medication Guide that comes in the medicine package when you start taking this medicine and each time you get a refill.  Check the label on the medicine for directions about your specific dose. Take this medicine exactly as your healthcare provider prescribes. Do not take more of it or take it longer than prescribed. Do not stop taking this medicine without your healthcare provider's approval. You may have to reduce your dosage gradually. Stopping too quickly may cause withdrawal symptoms.  Check with your healthcare provider before using this medicine in children under age 18.  This medicine may come in different forms. If you have extended-release tablets, do not break, crush, or chew them. Swallow them whole with a full glass of water. Take the extended-release tablets at the same time every day in the late evening, preferably at bedtime, on an  empty stomach. Ask your pharmacist if you have the extended-release tablets.   Take the regular tablets with food. You may break the regular tablets in half, but do not crush, or chew them. Swallow them with a full glass of water.  What if I miss a dose?  If you miss a dose, take it as soon as you remember unless it is almost time for the next scheduled dose. In that case, skip the missed dose and take the next one as directed. Do not take double doses. If you are not sure of what to do if you miss a dose, or if you miss more than one dose, contact your healthcare provider. Do not stop taking this medicine without your healthcare provider's approval. You may need to reduce your dose slowly to avoid withdrawal symptoms.  What if I overdose?  If you or anyone else has intentionally taken too much of this medicine, call 911 or go to the emergency room right away. If you pass out, have seizures, weakness or confusion, or have trouble breathing, call 911. If you think that you or anyone else may have taken too much of this medicine, call the poison control center. Do this even if there are no signs of discomfort or poisoning. The poison control center number is [phone].  Symptoms of an acute overdose may include: drowsiness, erection that lasts more than 4 hours, vomiting, irregular heartbeat, seizures, slow breathing, breathing that stops.  What should I watch out for?   Antidepressant medicines may increase suicidal thoughts or actions in some children, teenagers, and young adults within the first few months of treatment or if your dose changes. Talk with your provider about this.  Behavior changes may be caused by the medicine or by depression or another mental health problem. Contact your provider right away if you or your family notice any disturbing changes in your thoughts or behavior, such as:    More outgoing or aggressive behavior than normal    Confusion    Hallucinations    Worsening of  depression    Suicidal thoughts  This medicine may trigger angle-closure glaucoma. Contact your provider right away if you have eye pain, vision changes, or redness and swelling in or around your eye.  It may take several weeks before you start to feel better. Do not stop taking this medicine unless your healthcare provider tells you to do so. You may have withdrawal symptoms if you stop this medicine abruptly.  This medicine increases the effects of alcohol and other drugs that slow down your nervous system. Do not drink alcohol or take other medicines unless your healthcare provider approves.  This medicine may make you dizzy or drowsy or cause blurred vision. Do not drive or operate machinery unless you are fully alert.  You may feel dizzy or faint when you get up quickly after sitting or lying down. Getting up slowly may help.  If you need emergency care, surgery, lab tests, or dental work, tell the healthcare provider or dentist you are taking this medicine.  Adults over the age of 65 may be at greater risk for side effects. Talk with your healthcare provider about this.  You may need blood tests regularly to find out how this medicine affects you. Keep all appointments for these tests.  This medicine may cause dry mouth. Sucking hard candy, taking sips of water, or chewing sugarless gum may help.  This medicine may cause a life-threatening problem called serotonin syndrome if you take it with certain other medicines, such as antidepressants, migraine medicines, pain medicines, some cough medicines, and Ryan s wort. Make sure that your providers know ALL of the medicines that you take. Contact your healthcare provider right away if you have:    Restlessness    Loss of coordination    Fast heart beat    Rapid changes in blood pressure    Increased body temperature    Nausea    Vomiting    Diarrhea  This medicine may increase your blood level of cholesterol or triglycerides. Talk to your healthcare provider  about this.  Men: Rarely, this medicine may cause a painful erection of the penis that will not return to normal. If you have an erection lasting more than 4 hours, contact your healthcare provider or get medical care right away. It can lead to permanent erectile dysfunction if not treated.  What are the possible side effects?   Along with its needed effects, your medicine may cause some unwanted side effects. Some side effects may be very serious. Some side effects may go away as your body adjusts to the medicine. Tell your healthcare provider if you have any side effects that continue or get worse.  Life-threatening (Report these to your healthcare provider right away. If you are unable to reach your healthcare provider right away, get emergency medical care or call 911 for help): Allergic reaction (hives; itching; rash; trouble breathing; tightness in your chest; swelling of your lips, tongue, and throat).  Serious (Report these to your healthcare provider right away.): Chest pain; chills; dark urine; fast or irregular heartbeat; hallucinations; heavy sweating; high fever; fainting; loss of bladder control; mouth sores; muscle or joint pain; new or sudden changes in mood, behaviors, thoughts, or feelings; numbness or tingling in the hands or feet; persistent headache; prolonged erection; rash; seizures; severe drowsiness; severe muscle stiffness; severe restlessness or panic attacks; thoughts of suicide; trouble concentrating or memory problems; trouble urinating; trouble walking or loss of balance; twitching or involuntary movement of your body or face; unexplained sore throat; unusual bruising or bleeding; unusual excitement; unusual tiredness or weakness; worsening depression; yellowing of your eyes or skin.  Other: Abnormal dreams; bloating; change in sense of taste; change in sexual ability or desire; constipation; diarrhea; mild dizziness; mild drowsiness; dry mouth; mild headache; increased appetite;  nausea; trouble sleeping; vision problems; weight gain or loss.  What products might interact with this medicine?   When you take this medicine with other medicines, it can change the way this or any of the other medicines work. Nonprescription medicines, vitamins, natural remedies, and certain foods may also interact. Using these products together might cause harmful side effects. Talk to your healthcare provider if you are taking:    Abiraterone (Zytiga)    ACE inhibitors such as benazepril (Lotensin), captopril, enalapril (Vasotec), fosinopril, lisinopril (Prinivil, Zestril), moexipril (Univasc), quinapril (Accupril), and ramipril (Altace)    Alpha blockers such as alfuzosin (Uroxatral), doxazosin (Cardura), prazosin (Minipress), silodosin (Rapaflo), tamsulosin (Flomax), and terazosin    Angiotensin receptor blockers (ARBs) such as azilsartan (Edarbi), candesartan (Atacand), eprosartan (Teveten), irbesartan (Avapro), losartan (Cozaar), olmesartan (Benicar), telmisartan (Micardis), and valsartan (Diovan)    Antianxiety medicines such as alprazolam (Xanax), chlordiazepoxide, clonazepam (Klonopin), diazepam (Valium), lorazepam (Ativan), and triazolam (Halcion)    Antiarrhythmic medicines (to treat irregular heartbeat) such as amiodarone (Cordarone, Pacerone), disopyramide (Norpace), dronedarone (Multaq), flecainide, procainamide, propafenone (Rythmol), and quinidine    Antibiotics such as azithromycin (Zithromax, Zmax), ciprofloxacin (Cipro), clarithromycin (Biaxin), erythromycin (E.E.S., Adiel-Tab, Erythrocin), isoniazid, levofloxacin (Levaquin), linezolid (Zyvox), metronidazole, moxifloxacin (Avelox), rifabutin (Mycobutin), rifampin (Rifadin), and telithromycin (Ketek)    Antidepressants such as amitriptyline, amoxapine, citalopram (Celexa), clomipramine (Anafranil), desipramine (Norpramin), desvenlafaxine (Pristiq), duloxetine (Cymbalta), escitalopram (Lexapro), fluoxetine (Prozac), fluvoxamine (Luvox), imipramine  (Tofranil), levomilnacipran (Fetzima), nefazodone, nortriptyline (Pamelor), protriptyline (Vivactil), sertraline (Zoloft), trimipramine (Surmontil), venlafaxine (Effexor), vilazodone (Viibryd), and vortioxetine (Trintellix)    Antifungal medicines such as fluconazole (Diflucan), itraconazole (Sporanox), ketoconazole (Nizoral), posaconazole (Noxafil), and voriconazole (Vfend)    Antihistamines such as chlorpheniramine (Chlor-Trimeton), clemastine (Tavist), diphenhydramine (Benadryl), and hydroxyzine (Vistaril)    Antipsychotic medicines such as aripiprazole (Abilify), asenapine (Saphris), brexpiprazole (Rexulti), chlorpromazine, clozapine (Clozaril, FazaClo), fluphenazine, haloperidol (Haldol), iloperidone (Fanapt), loxapine (Loxitane), lurasidone (Latuda), olanzapine (Zyprexa), paliperidone (Invega), perphenazine, pimozide (Orap), quetiapine (Seroquel), risperidone (Risperdal), thioridazine, trifluoperazine, and ziprasidone (Geodon)    Antiseizure medicines such as carbamazepine (Carbatrol, Epitol, Equetro, Tegretol), felbamate (Felbatol), gabapentin (Neurontin), lamotrigine (Lamictal), levetiracetam (Keppra), oxcarbazepine (Trileptal), phenytoin (Dilantin, Phenytek), primidone (Mysoline), tiagabine (Gabitril), topiramate (Qudexy, Topamax, Trokendi), and valproic acid (Depacon, Depakene, Depakote)    Arsenic trioxide (Trisenox)    Aspirin and other salicylates    Barbiturates such as butabarbital (Butisol), pentobarbital (Nembutal), phenobarbital, and secobarbital (Seconal)    Beta blockers such as acebutolol (Sectral), atenolol (Tenormin), bisoprolol (Zebeta), carvedilol (Coreg), labetalol (Trandate), metoprolol (Lopressor, Toprol), nadolol (Corgard), nebivolol (Bystolic), pindolol, and sotalol (Betapace, Sorine)    Bosentan (Tracleer)    Bupropion (Aplenzin, Forfivo, Wellbutrin, Buproban, Zyban)    Buspirone    Calcium channel blockers such as amlodipine (Norvasc), amlodipine/atorvastatin (Caduet), diltiazem  (Cardizem, Cartia, Tiazac), felodipine, isradipine (DynaCirc), nicardipine (Cardene), nifedipine (Adalat CC, Procardia), nisoldipine (Sular), and verapamil (Calan, Covera, Verelan)    Cimetidine (Tagamet)    Colchicine (Colcrys)    Cough, cold, or allergy medicines and decongestants    Dextromethorphan, an ingredient in many cough, cold, or allergy medicines such as Robitussin-DM    Dextromethorphan/quinidine (Nuedexta)    Digoxin (Lanoxin)    Doxepin (Silenor)    HIV medicines such as atazanavir (Reyataz), darunavir (Prezista), delavirdine (Rescriptor), efavirenz (Sustiva), emtricitabine (Emtriva), etravirine (Intelence), fosamprenavir (Lexiva), indinavir (Crixivan), lopinavir/ritonavir (Kaletra), maraviroc (Selzentry), nelfinavir (Viracept), nevirapine (Viramune), rilpivirine (Edurant), ritonavir (Norvir), saquinavir (Invirase), stavudine (Zerit), and tipranavir (Aptivus)    Imatinib (Gleevec)    Immunosuppressants such as cyclosporine (Gengraf, Neoral, Sandimmune) and tacrolimus (Astagraf, Prograf, Protopic)    Lithium (Lithobid)    MAO inhibitors such as isocarboxazid (Marplan), phenelzine (Nardil), selegiline (Eldepryl, Emsam, Zelapar), and tranylcypromine (Parnate) (Do not take this medicine and an MAO inhibitor within 14 days of each other.)    Medicines to treat low sodium levels such as conivaptan (Vaprisol) and tolvaptan (Samsca)    Medicines to treat or prevent blood clots such as apixaban (Eliquis), dabigatran (Pradaxa), rivaroxaban (Xarelto), and warfarin (Coumadin)    Metoclopramide (Metozolv, Reglan)    Migraine medicines such as almotriptan (Axert), dihydroergotamine (D.H.E. 45, Migranal), eletriptan (Relpax), ergotamine (Ergomar), frovatriptan (Frova), naratriptan (Amerge), rizatriptan (Maxalt), sumatriptan (Alsuma, Imitrex, Sumavel), and zolmitriptan (Zomig)    Milnacipran (Savella)    Muscle relaxants such as baclofen (Gablofen, Lioresal), carisoprodol (Soma), cyclobenzaprine (Amrix), dantrolene  (Dantrium), methocarbamol (Robaxin), and tizanidine (Zanaflex)    Natural remedies such as gotu cricket, kava, Ryan's wort, SAMe, tryptophan, and valerian    Nausea medicines such as dolasetron (Anzemet), droperidol (Inapsine), ondansetron (Zofran), prochlorperazine (Compro), and promethazine    Nonsteroidal anti-inflammatory medicines (NSAIDs) such as celecoxib (Celebrex), diclofenac (Cambia, Voltaren, Zipsor), diflunisal, etodolac, ibuprofen (Advil, Motrin), indomethacin (Indocin), ketoprofen, ketorolac, meloxicam (Mobic), nabumetone (Relafen), naproxen (Aleve, Anaprox, Naprelan), oxaprozin (Daypro), piroxicam (Feldene), and sulindac (Clinoril)    Pain medicines such as codeine, fentanyl (Abstral, Actiq, Duragesic, Fentora, Sublimaze), hydrocodone/acetaminophen (Norco, Vicodin), hydromorphone (Dilaudid, Exalgo), meperidine (Demerol), methadone (Dolophine, Methadose), morphine (Gracie, MS Contin), morphine/naltrexone (Embeda), oxycodone (OxyContin, Roxicodone), oxycodone/acetaminophen (Percocet, Roxicet), pentazocine (Talwin), tapentadol (Nucynta), and tramadol (ConZip, Ultram)    Parkinson s disease medicines such as bromocriptine (Cycloset, Parlodel), cabergoline, levodopa/carbidopa (Duopa, Rytary, Sinemet), entacapone (Comtan), pramipexole (Mirapex), rasagiline (Azilect), and ropinirole (Requip)    Paroxetine (Brisdelle, Paxil, Pexeva)    Procarbazine (Matulane)    Products that contain methylene blue (Hyophen, Prosed DS, Urophen, Malina)    Propranolol (Hemangeol, Inderal, InnoPran)    Sleeping pills such as eszopiclone (Lunesta), zaleplon (Sonata), and zolpidem (Ambien, Edluar, Intermezzo)    Vandetanib (Caprelsa)  Ask your healthcare provider or pharmacist if you need to avoid products that contain grapefruit, [place] oranges, and tangelos while you are taking this medicine. These fruits and juices can affect the way this medicine works and may increase your risk of serious side effects.  Do not drink alcohol  while you are taking this medicine.  If you are not sure if your medicines might interact, ask your pharmacist or healthcare provider. Keep a list of all your medicines with you. List all the prescription medicines, nonprescription medicines, supplements, natural remedies, and vitamins that you take. Be sure that you tell all healthcare providers who treat you about all the products you are taking.   How should I store this medicine?  Store this medicine at room temperature. Keep the container tightly closed. Protect it from heat, high humidity, and bright light.    This advisory includes selected information only and may not include all side effects of this medicine or interactions with other medicines. Ask your healthcare provider or pharmacist for more information or if you have any questions.  Ask your pharmacist for the best way to dispose of outdated medicine or medicine you have not used. Do not throw medicine in the trash.  Keep all medicines out of the reach of children.   Do not share medicines with other people.   Developed by [facility] [facility].  Medication Advisor 2016.3 published by [employer] [facility] [facility].  Last modified: 2016-07-28  Last reviewed: 2016-01-11  This content is reviewed periodically and is subject to change as new health information becomes available. The information is intended to inform and educate and is not a replacement for medical evaluation, advice, diagnosis or treatment by a healthcare professional.  References   Medication Advisor 2016.3 Index    Copyright   2016 [facility], a division of [employer] All rights reserved.

## 2017-12-29 NOTE — PATIENT INSTRUCTIONS
Patient Information     Patient Name MRN Milagros Duncan [id number] Female [id number]      Patient Instructions by Arabella Longo RN at 2017 12:30 PM     Author:  Arabella Longo RN Service:  (none) Author Type:  NURS- Registered Nurse     Filed:  2017 12:36 PM Encounter Date:  2017 Status:  Signed     :  Arabella Longo RN (NURS- Registered Nurse)            2017 Details    Sun  Thu Fri Sat       1               2               3               4               5                 6               7               8               9               10               11      Hold   See details      12      2.5 mg           13      2.5 mg         14      2.5 mg         15      5 mg         16      5 mg         17      5 mg         18      2.5 mg         19      5 mg           20      5 mg         21      2.5 mg         22      5 mg         23      5 mg         24      5 mg         25      2.5 mg         26                 27               28               29               30               31                  Date Details    This INR check       Date of next INR:  2017         How to take your warfarin dose     To take:  2.5 mg Take one of the 2.5 mg tablets.    To take:  5 mg Take one of the 5 mg tablets.    Hold Do not take your warfarin dose. The details table to the right may include additional information.                  Description          Hold dose today then take 2.5 mg x 3 days and resume dose recheck in 2 weeks. D/t steriod injection done 17. Arabella Longo RN    2017  12:35 PM          Anticoagulation Summary as of 2017     INR goal 2.5-3.5    Today's INR 5.4    Next INR check 2017          Call your Anticoagulation Clinic at Dept: [phone]   if:   1. Any medications are started, stopped, or there is a change in dose.  2. You experience any bleeding that is not easily stopped or if it is recurrent.  3. You notice an increase in  bruising or any bruising that does not heal.  4. You are scheduled for surgery, colonoscopy, dental extraction or any other procedure where you may need to stop your Coumadin (warfarin).

## 2017-12-29 NOTE — PATIENT INSTRUCTIONS
Patient Information     Patient Name MRN Sex Milagros Santos [id number] Female 1931      Patient Instructions by Raisa Madrid MD at 2017 12:41 PM     Author:  Raisa Madrid MD  Service:  (none) Author Type:  Physician     Filed:  2017 12:53 PM  Encounter Date:  2017 Status:  Addendum     :  Raisa Madrid MD (Physician)        Related Notes: Original Note by Raisa Madrid MD (Physician) filed at 2017 12:43 PM             1. Ultrasound showed sizable popliteal cyst with present of a joint body  2. Need to keep knee in hinge brace while awake  3. Ice your knee and a position of extension twice a day for 20 minutes  4. Referral to orthopedist provided  5. Atenolol was called to [facility] pharmacy as [facility] is unable to obtain medication  6.  Stop tylenol with codeine  7.  Hydrocodone 5/ 325 mg;  1/2 tablet every 6 hours prn           Adult Advisor 2016.3 published by [employer].  Last reviewed: 2014-10-06     Index [place] Exercises   Torn Meniscus in Knee (Meniscal Tear)   What is a torn meniscus?  The meniscus is a piece of tissue in the middle of the knee that acts like a cushion between the surfaces of joints. You have a meniscus on the inner side of your knee and on the outer side of the knee. Each meniscus acts a cushion between the shinbone and the thighbone. They act as shock absorbers during weight-bearing activities, like walking, running, or jumping. If a meniscus tears, it can cause knee pain and limit movement of your knee.  What is the cause?   A meniscus can tear if the knee is forcefully twisted, like from a sudden stop and turn. Sometimes it happens from less forceful movement, like kneeling or squatting.  What are the symptoms?   Symptoms may include:    Pain in your knee joint    Swelling    Trouble bending or straightening your leg    A snapping or popping sound at the time of the injury  If you have had the tear  for a while, it may give you pain on and off during activities, with or without swelling. Sometimes your knee may get stuck in one place. You may have stiffness in the knee.  How is it diagnosed?   Your healthcare provider will ask about your symptoms and how the injury happened. Your provider will examine your knee. Tests may include:    X-rays of the knee    MRI, which uses a strong magnetic field and radio waves to show detailed pictures of the knee  How is it treated?   Treatment depends on how bad the tear is.     You will need to change or stop doing the activities that cause pain until your knee has healed. For example, you may need to swim instead of run.    Your healthcare provider may recommend stretching and strengthening exercises to help you heal.    Your provider may wrap an elastic bandage around your knee to keep the swelling from getting worse. You may need to keep your knee in a knee immobilizer or brace and use crutches to protect your knee while you heal.    Your provider may give you a shot of a steroid medicine. This will not fix the torn meniscus but may relieve your symptoms temporarily.    Sometimes arthroscopic surgery is needed to repair or remove large, torn pieces of tissue. This surgery is done with a small scope inserted into your joint. Your provider uses the scope to look directly at your joint. Your provider can put tools through the scope to do the surgery. Because the meniscus is an important shock absorber, your provider will leave as much of the healthy part of the meniscus in your knee as possible.  If you have a small tear that has not been repaired or removed, you may still be able to do your usual activities. However, your knee may sometimes swell, lock, be stiff, or hurt during activities.  How can I take care of myself?   To keep swelling down and help relieve pain for the first few days after the injury:    Put an ice pack, gel pack, or package of frozen vegetables  wrapped in a cloth on your knee every 3 to 4 hours for up to 20 minutes at a time.    Keep your knee up on a pillow when you sit or lie down.    Take nonprescription pain medicine, such as acetaminophen, ibuprofen, or naproxen. Read the label and take as directed. Unless recommended by your healthcare provider, you should not take these medicines for more than 10 days.    Nonsteroidal anti-inflammatory medicines (NSAIDs), such as ibuprofen, naproxen, and aspirin, may cause stomach bleeding and other problems. These risks increase with age.    Acetaminophen may cause liver damage or other problems. Unless recommended by your provider, don't take more than 3000 milligrams (mg) in 24 hours. To make sure you don t take too much, check other medicines you take to see if they also contain acetaminophen. Ask your provider if you need to avoid drinking alcohol while taking this medicine.  Follow your healthcare provider's instructions. Ask your provider:    How and when you will get your test results    How long it will take to recover    If there are activities you should avoid and when you can return to your normal activities    How to take care of yourself at home    What symptoms or problems you should watch for and what to do if you have them  Make sure you know when you should come back for a checkup. Keep all appointments for provider visits or tests.  How can I help prevent a meniscal tear?  Warm-up exercises and stretching before activities can help prevent injuries. For example, stretch your leg muscles and do exercises that build strong thigh and calf muscles.   Follow safety rules and use any protective equipment recommended for your work or sport. For example, if you ski, make sure your ski bindings are set correctly by a trained professional so that your skis will release if you fall.  Developed by [employer].  Adult Advisor 2016.3 published by [employer].  Last modified: 2016-03-23  Last reviewed:  2015-06-29  This content is reviewed periodically and is subject to change as new health information becomes available. The information is intended to inform and educate and is not a replacement for medical evaluation, advice, diagnosis or treatment by a healthcare professional.  References   Adult Advisor 2016.3 Index    Copyright   2016 [employer] a division of [employer] All rights reserved.          Index [place]   Meniscal (Cartilage) Tear Exercises   Your healthcare provider may recommend exercises to help you heal. Talk to your healthcare provider or physical therapist about which exercises will best help you and how to do them correctly and safely.  You may do the first 7 exercises right away. You may do the rest of the exercises when the pain in your knee has decreased.    Passive knee extension: Do this exercise if you are unable to extend your knee fully. While lying on your back, place a rolled-up towel under the heel of your injured leg so the heel is about 6 inches off the ground. Relax your leg muscles and let gravity slowly straighten your knee. Try to hold this position for 2 minutes. Repeat 3 times. You may feel some discomfort while doing this exercise. Do the exercise several times a day.   This exercise can also be done while sitting in a chair with your heel on another chair or stool.    Heel slide: Sit on a firm surface with your legs straight in front of you. Slowly slide the heel of the foot on your injured side toward your buttock by pulling your knee toward your chest as you slide the heel. Return to the starting position. Do 2 sets of 15.    Standing calf stretch: Stand facing a wall with your hands on the wall at about eye level. Keep your injured leg back with your heel on the floor. Keep the other leg forward with the knee bent. Turn your back foot slightly inward (as if you were pigeon-toed). Slowly lean into the wall until you feel a stretch in the back of your  calf. Hold the stretch for 15 to 30 seconds. Return to the starting position. Repeat 3 times. Do this exercise several times each day.    Hamstring stretch on wall: Lie on your back with your buttocks close to a doorway. Stretch your uninjured leg straight out in front of you on the floor through the doorway. Raise your injured leg and rest it against the wall next to the door frame. Keep your leg as straight as possible. You should feel a stretch in the back of your thigh. Hold this position for 15 to 30 seconds. Repeat 3 times.    Straight leg raise: Lie on your back with your legs straight out in front of you. Bend the knee on your uninjured side and place the foot flat on the floor. Tighten the thigh muscle on your injured side and lift your leg about 8 inches off the floor. Keep your leg straight and your thigh muscle tight. Slowly lower your leg back down to the floor. Do 2 sets of 15.    Prone hip extension: Lie on your stomach with your legs straight out behind you. Fold your arms under your head and rest your head on your arms. Draw your belly button in towards your spine and tighten your abdominal muscles. Tighten the buttocks and thigh muscles of the leg on your injured side and lift the leg off the floor about 8 inches. Keep your leg straight. Hold for 5 seconds. Then lower your leg and relax. Do 2 sets of 15.    Clam exercise: Lie on your uninjured side with your hips and knees bent and feet together. Slowly raise your top leg toward the ceiling while keeping your heels touching each other. Hold for 2 seconds and lower slowly. Do 2 sets of 15 repetitions.    Wall squat with a ball: Stand with your back, shoulders, and head against a wall. Look straight ahead. Keep your shoulders relaxed and your feet 3 feet (90 centimeters) from the wall and shoulder's width apart. Place a soccer or basketball-sized ball behind your back. Keeping your back against the wall, slowly squat down to a 45-degree angle. Your  thighs will not yet be parallel to the floor. Try to keep your knees aligned over your second toe as you bend them. Hold this position for 10 seconds and then slowly slide back up the wall. Repeat 10 times. Build up to 2 sets of 15.    Step-up: Stand with the foot of your injured leg on a support 3 to 5 inches (8 to 13 centimeters) high --like a small step or block of wood. Keep your other foot flat on the floor. Shift your weight onto the injured leg on the support. Straighten your injured leg as the other leg comes off the floor. Return to the starting position by bending your injured leg and slowly lowering your uninjured leg back to the floor. Do 3 sets of 15.    Knee stabilization: Wrap a piece of elastic tubing around the ankle of your uninjured leg. Tie a knot in the other end of the tubing and close it in a door at about ankle height.  1. Stand facing the door on the leg without tubing (your injured leg) and bend your knee slightly, keeping your thigh muscles tight. Stay in this position while you move the leg with the tubing (the uninjured leg) straight back behind you. Do 2 sets of 15.  2. Turn 90 degrees so the leg without tubing is closest to the door. Move the leg with tubing away from your body. Do 2 sets of 15.  3. Turn 90 degrees again so your back is to the door. Move the leg with tubing straight out in front of you. Do 2 sets of 15.  4. Turn your body 90 degrees again so the leg with tubing is closest to the door. Move the leg with tubing across your body. Do 2 sets of 15.  Hold onto a chair if you need help balancing. This exercise can be made more challenging by standing on a firm pillow or foam mat while you move the leg with tubing.    Resisted terminal knee extension: Make a loop with a piece of elastic tubing by tying a knot in both ends. Close the knot in a door at knee height. Step into the loop with your injured leg so the tubing is around the back of your knee. Lift the other foot off the  ground and hold onto a chair for balance, if needed. Bend the knee with tubing about 45 degrees. Slowly straighten your leg, keeping your thigh muscle tight as you do this. Repeat 15 times. Do 2 sets of 15. If you need an easier way to do this, stand on both legs for better support while you do the exercise.  If you have access to a wobble board, do the following exercises:    Wobble board exercises  Stand on a wobble board with your feet shoulder-width apart.   1. Rock the board forwards and backwards 30 times, then side to side 30 times. Hold on to a chair if you need support.  2. Rotate the wobble board around so that the edge of the board is in contact with the floor at all times. Do this 30 times in a clockwise and then a counterclockwise direction.  3. Balance on the wobble board for as long as you can without letting the edges touch the floor. Try to do this for 2 minutes without touching the floor.  4. Rotate the wobble board in clockwise and counterclockwise circles, but do not let the edge of the board touch the floor.  When you have mastered the wobble exercises standing on both legs, try repeating them while standing on just your injured leg. After you are able to do these exercises on one leg, try to do them with your eyes closed. Make sure you have something nearby to support you in case you lose your balance.  Developed by [employer].  Adult Advisor 2016.3 published by [employer].  Last modified: 2016-06-06  Last reviewed: 2015-01-12  This content is reviewed periodically and is subject to change as new health information becomes available. The information is intended to inform and educate and is not a replacement for medical evaluation, advice, diagnosis or treatment by a healthcare professional.  References   Adult Advisor 2016.3 Index    Copyright   2016 [employer], a division of [employer] All rights reserved.

## 2017-12-29 NOTE — PATIENT INSTRUCTIONS
Patient Information     Patient Name MRN Sex Milagros Santos [id number] Female 1931      Patient Instructions by Karena Abbott NP at 2017  3:15 PM     Author:  Karena Abbott NP Service:  (none) Author Type:  PHYS- Nurse Practitioner     Filed:  2017  3:45 PM Encounter Date:  2017 Status:  Signed     :  Karena Abbott NP (PHYS- Nurse Practitioner)            Return to clinic if cough worsens or does not improve  May use Coricidin HBP for cough  Honey is good for cough  Increase fluids

## 2017-12-29 NOTE — PATIENT INSTRUCTIONS
Patient Information     Patient Name MRN Milagros Duncan [id number] Female 1931      Patient Instructions by Fang Warren RN at 2017  1:45 PM     Author:  Fang Warren RN Service:  (none) Author Type:  NURS- Registered Nurse     Filed:  2017  2:01 PM Encounter Date:  2017 Status:  Signed     :  Fang Warren RN (NURS- Registered Nurse)            2017 Details    Sun Mon Tue Wed Thu Fri Sat        1               2               3               4                 5               6               7               8               9               10               11                 12               13               14               15               16               17               18                 19               20      1.25 mg   See details      21      5 mg         22      2.5 mg         23      5 mg         24      2.5 mg         25      2.5 mg           26      5 mg         27      2.5 mg         28      5 mg         29      2.5 mg         30      5 mg            Date Details    This INR check               How to take your warfarin dose     To take:  1.25 mg Take half of a 2.5 mg tablet.    To take:  2.5 mg Take one of the 2.5 mg tablets.    To take:  2.5 mg Take half of a 5 mg tablet.    To take:  5 mg Take one of the 5 mg tablets.           2017 Details    Sun Mon Tue Wed Thu Fri Sat          1      2.5 mg         2      2.5 mg           3      5 mg         4      2.5 mg         5               6               7               8               9                 10               11               12               13               14               15               16                 17               18               19               20               21               22               23                 24               25               26               27               28               29               30                 31                       Date Details   No additional details    Date of next INR:  12/4/2017         How to take your warfarin dose     To take:  2.5 mg Take one of the 2.5 mg tablets.    To take:  2.5 mg Take half of a 5 mg tablet.    To take:  5 mg Take one of the 5 mg tablets.             Description          Take 1/2 dose today (1.25 mg instead of 2.5 mg), then continue same Warfarin dose and recheck in 2 weeks.  WINTER JAMESON RN ....................  11/20/2017   2:01 PM                Anticoagulation Summary as of 11/20/2017     INR goal 2.5-3.5    Today's INR 3.8!    Next INR check 12/4/2017          Call your Anticoagulation Clinic at Dept: [phone]   if:   1. Any medications are started, stopped, or there is a change in dose.  2. You experience any bleeding that is not easily stopped or if it is recurrent.  3. You notice an increase in bruising or any bruising that does not heal.  4. You are scheduled for surgery, colonoscopy, dental extraction or any other procedure where you may need to stop your Coumadin (warfarin).

## 2017-12-29 NOTE — PATIENT INSTRUCTIONS
Patient Information     Patient Name MRN Sex Milagros Santos [id number] Female 1931      Patient Instructions by Raisa Madrid MD at 2017  3:12 PM     Author:  Raisa Madrid MD  Service:  (none) Author Type:  Physician     Filed:  6/15/2017  5:49 PM  Encounter Date:  2017 Status:  Addendum     :  Raisa Madrid MD (Physician)        Related Notes: Original Note by Raisa Madrid MD (Physician) filed at 2017  3:15 PM             1.   Enrolled in the coumadin clinic;  Goal INR 2-3    2.  Recommend use of cane or walker  for balance   3.  Will try to obtain outside records.     4.  INR today .   5.  Take extra dose of coumadin 2.5 mg along with 5 mg;  Recheck INR in a week.   6.  Handouts on falls / PALS provided

## 2017-12-30 NOTE — NURSING NOTE
Patient Information     Patient Name MRN Milagros Duncan [id number] Female 1931      Nursing Note by Lakisha Newberry at 2017 10:00 AM     Author:  Lakisha Newberry Service:  (none) Author Type:  (none)     Filed:  2017 10:15 AM Encounter Date:  2017 Status:  Signed     :  Lakisha Newberry            Patient is following up from the ER for Left knee pain and states the pain meds not helping.   Lakisha Newberry LPN........................2017  10:11 AM

## 2017-12-30 NOTE — NURSING NOTE
Patient Information     Patient Name MRN Sex Milagros Santos [id number] Female 1931      Nursing Note by Radha Malagon at 10/30/2017  9:00 AM     Author:  Radha Malagon Service:  (none) Author Type:  (none)     Filed:  10/30/2017  9:25 AM Encounter Date:  10/30/2017 Status:  Signed     :  Radha Malagon            Patient is here for concerns of swelling in feet and black and blue on toes. Daughter states had a 9 hour drive home from [place] yesterday. Patient states is also shortness of breath and when walking fast becomes dizzy.  Radha Malagon LPN .............10/30/2017  9:10 AM

## 2017-12-30 NOTE — NURSING NOTE
Patient Information     Patient Name MRN Sex Milagros Santos [id number] Female 1931      Nursing Note by Lakisha Newberry at 2017  2:15 PM     Author:  Lakisha Newberry Service:  (none) Author Type:  (none)     Filed:  2017  2:47 PM Encounter Date:  2017 Status:  Signed     :  Lakisha Newberry            Patient presents to Establish care and INR patient   Lakisha Newberry LPN........................2017  2:14 PM

## 2017-12-30 NOTE — NURSING NOTE
Patient Information     Patient Name MRN Sex Milagros Santos [id number] Female 1931      Nursing Note by Gisselle Louis at 2017  3:45 PM     Author:  Gisselle Louis Service:  (none) Author Type:  (none)     Filed:  2017  4:46 PM Encounter Date:  2017 Status:  Signed     :  Gisselle Louis            Performed EKG in clinic per VORB from Nicole Lee NP.  Order sent to provider for co-sign.  Gisselle Louis 2017 4:45 PM

## 2017-12-30 NOTE — NURSING NOTE
Patient Information     Patient Name MRN Sex Milagros Santos [id number] Female 1931      Nursing Note by Gosselin, Norma J at 2017  1:30 PM     Author:  Gosselin, Norma J  Service:  (none) Author Type:  (none)     Filed:  2017  1:49 PM  Encounter Date:  2017 Status:  Addendum     :  Gosselin, Norma J        Related Notes: Original Note by Gosselin, Norma J filed at 2017  1:33 PM            Follow up left knee. Requesting injection.  Norma J Gosselin LPN....................  2017   1:32 PM    [place] Protocol    A. Pre-procedure verification complete yes  1-relevant information / documentation available, reviewed and properly matched to the patient; 2-consent accurate and complete, 3-equipment and supplies available    B. Site marking complete N/A  Site marked if not in continuous attendance with patient    C. TIME OUT completed yes  Time Out was conducted just prior to starting procedure to verify the eight required elements: 1-patient identity, 2-consent accurate and complete, 3-position, 4-correct side/site marked (if applicable), 5-procedure, 6-relevant images / results properly labeled and displayed (if applicable), 7-antibiotics / irrigation fluids (if applicable), 8-safety precautions.

## 2017-12-30 NOTE — NURSING NOTE
Patient Information     Patient Name MRN Milagros Duncan [id number] Female 1931      Nursing Note by Jaquelin Gandara at 2017  3:15 PM     Author:  Jaquelin Gandara Service:  (none) Author Type:  NURS- Student Practical Nurse     Filed:  2017  3:35 PM Encounter Date:  2017 Status:  Signed     :  Jaquelin Gandara (NURS- Student Practical Nurse)            COUGH/CONGESTION  Onset:  friday  Fever:  no  Productive:  yes  Color:  yellow  Shortness of breath:  Yes, with lying down  Chills:  no  Pain scale:  unknown  Activity Level:  normal  Appetite:  normal  Able to sleep:  no  Jaquelin Gandara LPN .............2017  3:26 PM

## 2017-12-30 NOTE — NURSING NOTE
Patient Information     Patient Name MRN Sex Milagros Santos [id number] Female 1931      Nursing Note by Maria Guadalupe Robb at 2017  3:45 PM     Author:  Maria Guadalupe Robb Service:  (none) Author Type:  (none)     Filed:  2017  2:55 PM Encounter Date:  2017 Status:  Signed     :  Maria Guadalupe Robb            Patient is here today for a consult for Left knee Fuchs's cyst. DOI 8/3/17 Maria Guadalupe Robb LPN......................2017 2:54 PM

## 2017-12-30 NOTE — NURSING NOTE
Patient Information     Patient Name MRN Sex Milagros Santos [id number] Female 1931      Nursing Note by Adelita Stapleton at 2017  9:45 AM     Author:  Adelita Stapleton Service:  (none) Author Type:  (none)     Filed:  2017 10:42 AM Encounter Date:  2017 Status:  Signed     :  Adelita Stapleton            Patient presents to the clinic for medication renewal.  Adelita ALBERTO, CARLY.......2017..9:55 AM

## 2017-12-30 NOTE — NURSING NOTE
Patient Information     Patient Name MRN Sex Milagros Santos [id number] Female 1931      Nursing Note by Gisselle Louis at 2017  3:45 PM     Author:  Gisselle Louis Service:  (none) Author Type:  (none)     Filed:  2017  3:51 PM Encounter Date:  2017 Status:  Signed     :  Gisselle Louis            Patient comes in for a consult on s/p aortic valve replacement.  Gisselle Louis LPN ....................  2017   3:51 PM

## 2018-01-03 ENCOUNTER — ANTICOAGULATION - GICH (OUTPATIENT)
Dept: INTERNAL MEDICINE | Facility: OTHER | Age: 83
End: 2018-01-03

## 2018-01-03 DIAGNOSIS — Z79.01 LONG TERM CURRENT USE OF ANTICOAGULANT: ICD-10-CM

## 2018-01-03 DIAGNOSIS — Z95.2 PRESENCE OF PROSTHETIC HEART VALVE: ICD-10-CM

## 2018-01-03 LAB — INR - HISTORICAL: 2.2

## 2018-01-17 ENCOUNTER — COMMUNICATION - GICH (OUTPATIENT)
Dept: FAMILY MEDICINE | Facility: OTHER | Age: 83
End: 2018-01-17

## 2018-01-17 DIAGNOSIS — Z79.01 LONG TERM CURRENT USE OF ANTICOAGULANT: ICD-10-CM

## 2018-01-17 DIAGNOSIS — Z95.2 PRESENCE OF PROSTHETIC HEART VALVE: ICD-10-CM

## 2018-01-24 ENCOUNTER — ANTICOAGULATION - GICH (OUTPATIENT)
Dept: INTERNAL MEDICINE | Facility: OTHER | Age: 83
End: 2018-01-24

## 2018-01-24 DIAGNOSIS — Z79.01 LONG TERM CURRENT USE OF ANTICOAGULANT: ICD-10-CM

## 2018-01-24 DIAGNOSIS — Z95.2 PRESENCE OF PROSTHETIC HEART VALVE: ICD-10-CM

## 2018-01-24 LAB — INR - HISTORICAL: 3.2

## 2018-01-26 VITALS
WEIGHT: 146 LBS | OXYGEN SATURATION: 93 % | BODY MASS INDEX: 27.05 KG/M2 | HEART RATE: 60 BPM | SYSTOLIC BLOOD PRESSURE: 134 MMHG | HEART RATE: 65 BPM | BODY MASS INDEX: 27.56 KG/M2 | HEIGHT: 61 IN | SYSTOLIC BLOOD PRESSURE: 118 MMHG | DIASTOLIC BLOOD PRESSURE: 80 MMHG | DIASTOLIC BLOOD PRESSURE: 52 MMHG | WEIGHT: 140.8 LBS

## 2018-01-26 VITALS
HEIGHT: 60 IN | SYSTOLIC BLOOD PRESSURE: 170 MMHG | HEART RATE: 60 BPM | WEIGHT: 142 LBS | DIASTOLIC BLOOD PRESSURE: 62 MMHG | BODY MASS INDEX: 27.88 KG/M2

## 2018-01-26 VITALS
SYSTOLIC BLOOD PRESSURE: 140 MMHG | HEART RATE: 78 BPM | DIASTOLIC BLOOD PRESSURE: 80 MMHG | BODY MASS INDEX: 27.24 KG/M2 | TEMPERATURE: 97.7 F | OXYGEN SATURATION: 95 % | WEIGHT: 141.8 LBS

## 2018-01-26 VITALS
BODY MASS INDEX: 26.81 KG/M2 | HEIGHT: 60 IN | WEIGHT: 142 LBS | SYSTOLIC BLOOD PRESSURE: 128 MMHG | HEART RATE: 60 BPM | DIASTOLIC BLOOD PRESSURE: 82 MMHG | DIASTOLIC BLOOD PRESSURE: 52 MMHG | WEIGHT: 143 LBS | HEIGHT: 61 IN | HEART RATE: 60 BPM | BODY MASS INDEX: 28.07 KG/M2 | TEMPERATURE: 97.1 F | SYSTOLIC BLOOD PRESSURE: 144 MMHG

## 2018-01-26 VITALS
DIASTOLIC BLOOD PRESSURE: 70 MMHG | HEIGHT: 61 IN | HEART RATE: 68 BPM | BODY MASS INDEX: 27.64 KG/M2 | WEIGHT: 146.4 LBS | SYSTOLIC BLOOD PRESSURE: 112 MMHG | HEART RATE: 64 BPM | SYSTOLIC BLOOD PRESSURE: 130 MMHG | WEIGHT: 130 LBS | DIASTOLIC BLOOD PRESSURE: 84 MMHG

## 2018-02-02 DIAGNOSIS — Z95.2 S/P AVR: Primary | ICD-10-CM

## 2018-02-07 ENCOUNTER — COMMUNICATION - GICH (OUTPATIENT)
Dept: FAMILY MEDICINE | Facility: OTHER | Age: 83
End: 2018-02-07

## 2018-02-07 DIAGNOSIS — Z95.2 PRESENCE OF PROSTHETIC HEART VALVE: ICD-10-CM

## 2018-02-11 PROBLEM — Z79.01 LONG TERM (CURRENT) USE OF ANTICOAGULANTS: Status: ACTIVE | Noted: 2018-02-11

## 2018-02-11 PROBLEM — Z95.2 PRESENCE OF PROSTHETIC HEART VALVE: Status: ACTIVE | Noted: 2018-02-11

## 2018-02-12 NOTE — PATIENT INSTRUCTIONS
Patient Information     Patient Name MRN Milagros Duncan [id number] Female 1931      Patient Instructions by Winter Warren RN at 2017  9:00 AM     Author:  Winter Warren RN Service:  (none) Author Type:  NURS- Registered Nurse     Filed:  2017  8:54 AM Encounter Date:  2017 Status:  Signed     :  Winter Warren RN (NURS- Registered Nurse)            2017 Details    Sun  Fri Sat          1               2                 3               4               5               6      Hold   See details      7      5 mg         8      2.5 mg         9      2.5 mg           10      5 mg         11      2.5 mg         12      2.5 mg         13      2.5 mg         14      5 mg         15      2.5 mg         16      2.5 mg           17      5 mg         18      2.5 mg         19      2.5 mg         20      2.5 mg         21               22               23                 24               25               26               27               28               29               30                 31                      Date Details    This INR check       Date of next INR:  2017         How to take your warfarin dose     To take:  2.5 mg Take one of the 2.5 mg tablets.    To take:  2.5 mg Take half of a 5 mg tablet.    To take:  5 mg Take one of the 5 mg tablets.    Hold Do not take your warfarin dose. The details table to the right may include additional information.                  Description          HOLD today's dose, decrease dose and recheck INR in two weeks.  WINTER WARREN RN ....................  2017   8:53 AM              Anticoagulation Summary as of 2017     INR goal 2.5-3.5    Today's INR 4.2!    Next INR check 2017          Call your Anticoagulation Clinic at Dept: [phone]   if:   1. Any medications are started, stopped, or there is a change in dose.  2. You experience any bleeding that is not easily stopped or if it  is recurrent.  3. You notice an increase in bruising or any bruising that does not heal.  4. You are scheduled for surgery, colonoscopy, dental extraction or any other procedure where you may need to stop your Coumadin (warfarin).

## 2018-02-12 NOTE — PATIENT INSTRUCTIONS
Patient Information     Patient Name MRN Milagros Duncan [id number] Female 1931      Patient Instructions by Arabella Longo RN at 2017  9:00 AM     Author:  Arabella Longo RN Service:  (none) Author Type:  NURS- Registered Nurse     Filed:  2017  9:04 AM Encounter Date:  2017 Status:  Signed     :  Arabella Longo RN (NURS- Registered Nurse)            2017 Details    Sun Mon Tue Wed Thu Fri Sat          1               2                 3               4               5               6               7               8               9                 10               11               12               13               14               15               16                 17               18               19               20      2.5 mg   See details      21      2.5 mg         22      2.5 mg         23      2.5 mg           24      5 mg         25      2.5 mg         26      2.5 mg         27      2.5 mg         28      2.5 mg         29      2.5 mg         30      2.5 mg           31      5 mg                Date Details    This INR check               How to take your warfarin dose     To take:  2.5 mg Take one of the 2.5 mg tablets.    To take:  2.5 mg Take half of a 5 mg tablet.    To take:  5 mg Take one of the 5 mg tablets.           2018 Details    Sun Mon Tue Wed Thu Fri Sat      1      2.5 mg         2      2.5 mg         3      2.5 mg         4               5               6                 7               8               9               10               11               12               13                 14               15               16               17               18               19               20                 21               22               23               24               25               26               27                 28               29               30               31                   Date Details   No additional  details    Date of next INR:  1/3/2018         How to take your warfarin dose     To take:  2.5 mg Take one of the 2.5 mg tablets.    To take:  2.5 mg Take half of a 5 mg tablet.             Description          Decrease dose and recheck in 2 weeks.  .............Arabella Longo RN............  12/20/2017    9:03 AM              Anticoagulation Summary as of 12/20/2017     INR goal 2.5-3.5    Today's INR 3.7    Next INR check 1/3/2018          Call your Anticoagulation Clinic at Dept: [phone]   if:   1. Any medications are started, stopped, or there is a change in dose.  2. You experience any bleeding that is not easily stopped or if it is recurrent.  3. You notice an increase in bruising or any bruising that does not heal.  4. You are scheduled for surgery, colonoscopy, dental extraction or any other procedure where you may need to stop your Coumadin (warfarin).

## 2018-02-12 NOTE — PATIENT INSTRUCTIONS
Patient Information     Patient Name MRN Milagros Duncan [id number] Female 1931      Patient Instructions by Arabella Longo RN at 1/3/2018  9:15 AM     Author:  Arabella oLngo RN Service:  (none) Author Type:  NURS- Registered Nurse     Filed:  1/3/2018  9:25 AM Encounter Date:  1/3/2018 Status:  Signed     :  Arabella Longo RN (NURS- Registered Nurse)            2018 Details    Sun Mon  Thu Fri Sat      1               2               3      5 mg   See details      4      2.5 mg         5      2.5 mg         6      2.5 mg           7      2.5 mg         8      5 mg         9      2.5 mg         10      5 mg         11      2.5 mg         12      2.5 mg         13      2.5 mg           14      2.5 mg         15      5 mg         16      2.5 mg         17      5 mg         18      2.5 mg         19      2.5 mg         20      2.5 mg           21      2.5 mg         22      5 mg         23      2.5 mg         24      5 mg         25               26               27                 28               29               30               31                   Date Details    This INR check       Date of next INR:  2018         How to take your warfarin dose     To take:  2.5 mg Take one of the 2.5 mg tablets.    To take:  5 mg Take one of the 5 mg tablets.             Description          Increase dose and recheck in 3 weeks. .............Arabella Longo RN.......... 1/3/2018  9:24 AM                Anticoagulation Summary as of 1/3/2018     INR goal 2.5-3.5    Today's INR 2.2    Next INR check 2018          Call your Anticoagulation Clinic at Dept: [phone]   if:   1. Any medications are started, stopped, or there is a change in dose.  2. You experience any bleeding that is not easily stopped or if it is recurrent.  3. You notice an increase in bruising or any bruising that does not heal.  4. You are scheduled for surgery, colonoscopy, dental extraction or any  other procedure where you may need to stop your Coumadin (warfarin).

## 2018-02-13 NOTE — TELEPHONE ENCOUNTER
Patient Information     Patient Name MRN Sex Milagros Santos [id number] Female 1931      Telephone Encounter by Malia Cerrato RN at 2018  9:53 AM     Author:  Malia Cerrato RN Service:  (none) Author Type:  NURS- Registered Nurse     Filed:  2018 10:02 AM Encounter Date:  2018 Status:  Signed     :  Malia Cerrato RN (NURS- Registered Nurse)            Anticoagulant    Office visit in the past 12 months.    Last visit with LION CARLSON was on: 2017 in [facility]  Next visit with LION CARLSON is on: No future appointment listed with this provider  Next visit with Family Practice is on: No future appointment listed in this department    Lab tests:  PT/INR at least monthly    INR (no units)    Date Value   2018 2.2 (H)     HEMOGLOBIN                (g/dL)    Date Value   2017 11.2 (L)     Max refills 6 months.    Prescription refilled per RN Medication Refill Policy.................... Malia Cerrato RN ....................  2018   10:01 AM

## 2018-02-13 NOTE — TELEPHONE ENCOUNTER
Patient Information     Patient Name MRN Sex Milagros Santos [id number] Female 1931      Telephone Encounter by Winter Warren RN at 2018 11:22 AM     Author:  Winter Warren RN  Service:  (none) Author Type:  NURS- Registered Nurse     Filed:  2018 12:40 PM  Encounter Date:  2018 Status:  Addendum     :  Winter Warren RN (NURS- Registered Nurse)        Related Notes: Original Note by Winter Warren RN (NURS- Registered Nurse) filed at 2018 11:28 AM            Anticoagulant    Office visit in the past 12 months.    Last visit with LION CARLSON was on: 2017 in [facility]  Next visit with LION CARLSON is on: No future appointment listed with this provider  Next visit with Family Practice is on: No future appointment listed in this department    Lab tests:  PT/INR at least monthly    INR (no units)    Date Value   2018 3.2 (H)     HEMOGLOBIN                (g/dL)    Date Value   2017 11.2 (L)     RX was refilled on 18 #60 along with 5 mg tablets.  Called [facility] to confirm receipt of RX.  Willl refuse as not due for refill.  WINTER WARREN RN ....................  2018   12:39 PM

## 2018-02-13 NOTE — PATIENT INSTRUCTIONS
Patient Information     Patient Name MRN Milagros Duncan [id number] Female 1931      Patient Instructions by Arabella Longo RN at 2018  8:45 AM     Author:  Arabella Longo RN Service:  (none) Author Type:  NURS- Registered Nurse     Filed:  2018  8:31 AM Encounter Date:  2018 Status:  Signed     :  Arabella Longo RN (NURS- Registered Nurse)            2018 Details    Sun Mon Tue Wed Thu Fri Sat      1               2               3               4               5               6                 7               8               9               10               11               12               13                 14               15               16               17               18               19               20                 21               22               23               24      5 mg   See details      25      2.5 mg         26      2.5 mg         27      2.5 mg           28      2.5 mg         29      5 mg         30      2.5 mg         31      5 mg             Date Details    This INR check               How to take your warfarin dose     To take:  2.5 mg Take one of the 2.5 mg tablets.    To take:  5 mg Take one of the 5 mg tablets.           2018 Details    Sun Mon Tue Wed Thu Fri Sat         1      2.5 mg         2      2.5 mg         3      2.5 mg           4      2.5 mg         5      5 mg         6      2.5 mg         7      5 mg         8      2.5 mg         9      2.5 mg         10      2.5 mg           11      2.5 mg         12      5 mg         13      2.5 mg         14      5 mg         15      2.5 mg         16      2.5 mg         17      2.5 mg           18      2.5 mg         19      5 mg         20      2.5 mg         21      5 mg         22               23               24                 25               26               27               28                   Date Details   No additional details    Date of next INR:   2/21/2018         How to take your warfarin dose     To take:  2.5 mg Take one of the 2.5 mg tablets.    To take:  5 mg Take one of the 5 mg tablets.             Description          Continue same Warfarin dose and recheck in 1 month.  Arabella Longo RN   1/24/2018    8:31 AM              Anticoagulation Summary as of 1/24/2018     INR goal 2.5-3.5    Today's INR 3.2    Next INR check 2/21/2018          Call your Anticoagulation Clinic at Dept: [phone]   if:   1. Any medications are started, stopped, or there is a change in dose.  2. You experience any bleeding that is not easily stopped or if it is recurrent.  3. You notice an increase in bruising or any bruising that does not heal.  4. You are scheduled for surgery, colonoscopy, dental extraction or any other procedure where you may need to stop your Coumadin (warfarin).

## 2018-02-20 ENCOUNTER — DOCUMENTATION ONLY (OUTPATIENT)
Dept: FAMILY MEDICINE | Facility: OTHER | Age: 83
End: 2018-02-20

## 2018-02-20 PROBLEM — Z66 DNR NO CODE (DO NOT RESUSCITATE): Status: ACTIVE | Noted: 2017-06-14

## 2018-02-20 PROBLEM — Z79.01 LONG TERM CURRENT USE OF ANTICOAGULANT THERAPY: Status: ACTIVE | Noted: 2018-02-11

## 2018-02-20 PROBLEM — Z95.2 S/P AVR: Status: ACTIVE | Noted: 2017-06-14

## 2018-02-20 PROBLEM — H54.40 PROFOUND IMPAIRMENT, ONE EYE, IMPAIRMENT LEVEL NOT FURTHER SPECIFIED: Status: ACTIVE | Noted: 2018-02-20

## 2018-02-20 PROBLEM — Z79.01 ANTICOAGULATION MONITORING, INR RANGE 2-3: Status: ACTIVE | Noted: 2017-06-14

## 2018-02-20 RX ORDER — WARFARIN SODIUM 5 MG/1
TABLET ORAL
COMMUNITY
Start: 2018-01-03 | End: 2018-04-30

## 2018-02-20 RX ORDER — ATENOLOL 50 MG/1
50 TABLET ORAL 2 TIMES DAILY
COMMUNITY
Start: 2017-11-30

## 2018-02-20 RX ORDER — WARFARIN SODIUM 2.5 MG/1
TABLET ORAL
COMMUNITY
Start: 2018-01-03 | End: 2018-04-12

## 2018-02-20 RX ORDER — CHLORTHALIDONE 25 MG/1
12.5 TABLET ORAL EVERY MORNING
COMMUNITY
Start: 2017-07-19 | End: 2018-04-04

## 2018-02-20 RX ORDER — ASPIRIN 81 MG/1
81 TABLET ORAL
COMMUNITY
Start: 2017-06-14

## 2018-02-21 ENCOUNTER — ANTICOAGULATION THERAPY VISIT (OUTPATIENT)
Dept: ANTICOAGULATION | Facility: OTHER | Age: 83
End: 2018-02-21
Attending: FAMILY MEDICINE
Payer: MEDICARE

## 2018-02-21 DIAGNOSIS — Z79.01 LONG TERM CURRENT USE OF ANTICOAGULANT THERAPY: ICD-10-CM

## 2018-02-21 LAB — INR POINT OF CARE: 2.6 (ref 0.86–1.14)

## 2018-02-21 PROCEDURE — 36416 COLLJ CAPILLARY BLOOD SPEC: CPT | Mod: ZL

## 2018-02-21 PROCEDURE — 99207 ZZC NO CHARGE NURSE ONLY: CPT

## 2018-02-21 PROCEDURE — 85610 PROTHROMBIN TIME: CPT | Mod: QW,ZL

## 2018-02-21 NOTE — MR AVS SNAPSHOT
Milagros Peters   2/21/2018 9:15 AM   Anticoagulation Therapy Visit    Description:  86 year old female   Provider:  [facility]   Department:  [facility]           INR as of 2/21/2018     Today's INR 2.6      Anticoagulation Summary as of 2/21/2018     INR goal 2.5-3.5   Today's INR 2.6   Full instructions 5 mg on Mon, Wed; 2.5 mg all other days   Next INR check 3/21/2018    Indications   Presence of prosthetic heart valve [Z95.2]  Long term current use of anticoagulant therapy [Z79.01]         Description     Continue same Warfarin dose and recheck in 1 month.  Arabella Longo RN   2/21/2018    9:32 AM        February 2018 Details    Sun Mon Tue Wed Thu Fri Sat         1               2               3                 4               5               6               7               8               9               10                 11               12               13               14               15               16               17                 18               19               20               21      5 mg   See details      22      2.5 mg         23      2.5 mg         24      2.5 mg           25      2.5 mg         26      5 mg         27      2.5 mg         28      5 mg             Date Details   02/21 This INR check               How to take your warfarin dose     To take:  2.5 mg Take 1 of the 2.5 mg tablets.    To take:  5 mg Take 1 of the 5 mg tablets.           March 2018 Details    Sun Mon Tue Wed Thu Fri Sat         1      2.5 mg         2      2.5 mg         3      2.5 mg           4      2.5 mg         5      5 mg         6      2.5 mg         7      5 mg         8      2.5 mg         9      2.5 mg         10      2.5 mg           11      2.5 mg         12      5 mg         13      2.5 mg         14      5 mg         15      2.5 mg         16      2.5 mg         17      2.5 mg           18      2.5 mg         19      5 mg         20      2.5 mg         21            22               23                24                 25               26               27               28               29               30               31                Date Details   No additional details    Date of next INR:  3/21/2018         How to take your warfarin dose     To take:  2.5 mg Take 1 of the 2.5 mg tablets.    To take:  5 mg Take 1 of the 5 mg tablets.

## 2018-02-21 NOTE — PROGRESS NOTES
ANTICOAGULATION FOLLOW-UP CLINIC VISIT    Patient Name:  Milagros Peters  Date:  2/21/2018  Contact Type:  Face to Face    SUBJECTIVE:     Patient Findings     Positives No Problem Findings           OBJECTIVE    INR Protime   Date Value Ref Range Status   02/21/2018 2.6 (A) 0.86 - 1.14 Final       ASSESSMENT / PLAN  INR assessment THER    Recheck INR In: 4 WEEKS    INR Location Clinic      Anticoagulation Summary as of 2/21/2018     INR goal 2.5-3.5   Today's INR 2.6   Maintenance plan 5 mg (5 mg x 1) on Mon, Wed; 2.5 mg (2.5 mg x 1) all other days   Full instructions 5 mg on Mon, Wed; 2.5 mg all other days   Weekly total 22.5 mg   No change documented Arabella Longo, PAVEL   Next INR check 3/21/2018   Priority INR   Target end date Indefinite    Indications   Presence of prosthetic heart valve [Z95.2]  Long term current use of anticoagulant therapy [Z79.01]         Anticoagulation Episode Summary     INR check location     Preferred lab     Send INR reminders to  INR    Comments       Anticoagulation Care Providers     Provider Role Specialty Phone number    Raisa Madrid MD [facility] [phone]            See the Encounter Report to view Anticoagulation Flowsheet and Dosing Calendar (Go to Encounters tab in chart review, and find the Anticoagulation Therapy Visit)        Arabella Longo, RN

## 2018-02-28 ENCOUNTER — DOCUMENTATION ONLY (OUTPATIENT)
Dept: FAMILY MEDICINE | Facility: OTHER | Age: 83
End: 2018-02-28

## 2018-02-28 RX ORDER — WARFARIN SODIUM 2.5 MG/1
TABLET ORAL
COMMUNITY
Start: 2018-01-17 | End: 2018-04-12

## 2018-02-28 RX ORDER — WARFARIN SODIUM 5 MG/1
TABLET ORAL
COMMUNITY
Start: 2018-01-17 | End: 2018-04-12

## 2018-03-21 ENCOUNTER — ANTICOAGULATION THERAPY VISIT (OUTPATIENT)
Dept: ANTICOAGULATION | Facility: OTHER | Age: 83
End: 2018-03-21
Attending: FAMILY MEDICINE
Payer: MEDICARE

## 2018-03-21 DIAGNOSIS — Z79.01 LONG TERM CURRENT USE OF ANTICOAGULANT THERAPY: ICD-10-CM

## 2018-03-21 LAB — INR POINT OF CARE: 4.6 (ref 2.5–3.5)

## 2018-03-21 PROCEDURE — 85610 PROTHROMBIN TIME: CPT | Mod: QW,ZL

## 2018-03-21 PROCEDURE — 99207 ZZC NO CHARGE NURSE ONLY: CPT

## 2018-03-21 PROCEDURE — 36416 COLLJ CAPILLARY BLOOD SPEC: CPT | Mod: ZL

## 2018-03-21 NOTE — MR AVS SNAPSHOT
Milagros Peters   3/21/2018 9:30 AM   Anticoagulation Therapy Visit    Description:  86 year old female   Provider:  [facility]   Department:  [facility]           INR as of 3/21/2018     Today's INR 4.6!      Anticoagulation Summary as of 3/21/2018     INR goal 2.5-3.5   Today's INR 4.6!   Full instructions 3/21: Hold; Otherwise 5 mg on Mon, Wed; 2.5 mg all other days   Next INR check 4/11/2018    Indications   Presence of prosthetic heart valve [Z95.2]  Long term current use of anticoagulant therapy [Z79.01]         Description     Continue same dose and recheck in 2-3 weeks. ...............Arabella Longo RN    3/21/2018    9:31 AM      March 2018 Details    Sun Mon Tue Wed Thu Fri Sat         1               2               3                 4               5               6               7               8               9               10                 11               12               13               14               15               16               17                 18               19               20               21      Hold   See details      22      2.5 mg         23      2.5 mg         24      2.5 mg           25      2.5 mg         26      5 mg         27      2.5 mg         28      5 mg         29      2.5 mg         30      2.5 mg         31      2.5 mg          Date Details   03/21 This INR check               How to take your warfarin dose     To take:  2.5 mg Take 1 of the 2.5 mg tablets.    To take:  5 mg Take 1 of the 5 mg tablets.    Hold Do not take your warfarin dose. See the Details table to the right for additional instructions.                April 2018 Details    Sun Mon Tue Wed Thu Fri Sat     1      2.5 mg         2      5 mg         3      2.5 mg         4      5 mg         5      2.5 mg         6      2.5 mg         7      2.5 mg           8      2.5 mg         9      5 mg         10      2.5 mg         11            12               13               14                 15                16               17               18               19               20               21                 22               23               24               25               26               27               28                 29               30                     Date Details   No additional details    Date of next INR:  4/11/2018         How to take your warfarin dose     To take:  2.5 mg Take 1 of the 2.5 mg tablets.    To take:  5 mg Take 1 of the 5 mg tablets.

## 2018-03-21 NOTE — PROGRESS NOTES
ANTICOAGULATION FOLLOW-UP CLINIC VISIT    Patient Name:  Milagros Peters  Date:  3/21/2018  Contact Type:  Face to Face    SUBJECTIVE:     Patient Findings     Positives Other complaints (was out of town for the weekend with different diet and more activity)           OBJECTIVE    INR Protime   Date Value Ref Range Status   03/21/2018 4.6 (A) 2.5 - 3.5 Final       ASSESSMENT / PLAN  INR assessment SUPRA    Recheck INR In: 3 WEEKS    INR Location Clinic      Anticoagulation Summary as of 3/21/2018     INR goal 2.5-3.5   Today's INR 4.6!   Maintenance plan 5 mg (5 mg x 1) on Mon, Wed; 2.5 mg (2.5 mg x 1) all other days   Full instructions 3/21: Hold; Otherwise 5 mg on Mon, Wed; 2.5 mg all other days   Weekly total 22.5 mg   Next INR check 4/11/2018   Priority INR   Target end date Indefinite    Indications   Presence of prosthetic heart valve [Z95.2]  Long term current use of anticoagulant therapy [Z79.01]         Anticoagulation Episode Summary     INR check location     Preferred lab     Send INR reminders to  INR    Comments       Anticoagulation Care Providers     Provider Role Specialty Phone number    Raisa Madrid MD [facility] [phone]            See the Encounter Report to view Anticoagulation Flowsheet and Dosing Calendar (Go to Encounters tab in chart review, and find the Anticoagulation Therapy Visit)        Arabella Longo RN

## 2018-04-04 ENCOUNTER — APPOINTMENT (OUTPATIENT)
Dept: GENERAL RADIOLOGY | Facility: OTHER | Age: 83
End: 2018-04-04
Attending: FAMILY MEDICINE
Payer: MEDICARE

## 2018-04-04 ENCOUNTER — APPOINTMENT (OUTPATIENT)
Dept: GENERAL RADIOLOGY | Facility: OTHER | Age: 83
End: 2018-04-04
Attending: EMERGENCY MEDICINE
Payer: MEDICARE

## 2018-04-04 ENCOUNTER — HOSPITAL ENCOUNTER (EMERGENCY)
Facility: OTHER | Age: 83
Discharge: HOME | End: 2018-04-04
Attending: EMERGENCY MEDICINE | Admitting: EMERGENCY MEDICINE
Payer: MEDICARE

## 2018-04-04 VITALS
SYSTOLIC BLOOD PRESSURE: 194 MMHG | RESPIRATION RATE: 16 BRPM | TEMPERATURE: 96.7 F | DIASTOLIC BLOOD PRESSURE: 72 MMHG | OXYGEN SATURATION: 97 %

## 2018-04-04 DIAGNOSIS — S20.211A RIB CONTUSION, RIGHT, INITIAL ENCOUNTER: ICD-10-CM

## 2018-04-04 DIAGNOSIS — S80.12XA CONTUSION OF LEFT LOWER LEG, INITIAL ENCOUNTER: ICD-10-CM

## 2018-04-04 DIAGNOSIS — Z79.01 LONG TERM CURRENT USE OF ANTICOAGULANT THERAPY: ICD-10-CM

## 2018-04-04 DIAGNOSIS — Z95.3 H/O HEART VALVE REPLACEMENT WITH PORCINE VALVE: ICD-10-CM

## 2018-04-04 DIAGNOSIS — Z79.01 ANTICOAGULATION MONITORING, INR RANGE 2-3: ICD-10-CM
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
## 2018-04-04 DIAGNOSIS — D50.9 MICROCYTIC ANEMIA: ICD-10-CM

## 2018-04-04 LAB
ANION GAP SERPL CALCULATED.3IONS-SCNC: 7 MMOL/L (ref 3–14)
BASOPHILS # BLD AUTO: 0 10E9/L (ref 0–0.2)
BASOPHILS NFR BLD AUTO: 0.2 %
BUN SERPL-MCNC: 23 MG/DL (ref 7–25)
CALCIUM SERPL-MCNC: 9.8 MG/DL (ref 8.6–10.3)
CHLORIDE SERPL-SCNC: 101 MMOL/L (ref 98–107)
CO2 SERPL-SCNC: 32 MMOL/L (ref 21–31)
CREAT SERPL-MCNC: 0.71 MG/DL (ref 0.6–1.2)
DIFFERENTIAL METHOD BLD: ABNORMAL
EOSINOPHIL # BLD AUTO: 0.1 10E9/L (ref 0–0.7)
EOSINOPHIL NFR BLD AUTO: 0.7 %
ERYTHROCYTE [DISTWIDTH] IN BLOOD BY AUTOMATED COUNT: 17.2 % (ref 10–15)
GFR SERPL CREATININE-BSD FRML MDRD: 78 ML/MIN/1.7M2
GLUCOSE SERPL-MCNC: 114 MG/DL (ref 70–105)
HCT VFR BLD AUTO: 30.1 % (ref 35–47)
HGB BLD-MCNC: 8.8 G/DL (ref 11.7–15.7)
IMM GRANULOCYTES # BLD: 0 10E9/L (ref 0–0.4)
IMM GRANULOCYTES NFR BLD: 0.4 %
INR PPP: 2.4 (ref 0–1.3)
LYMPHOCYTES # BLD AUTO: 0.7 10E9/L (ref 0.8–5.3)
LYMPHOCYTES NFR BLD AUTO: 7.3 %
MCH RBC QN AUTO: 20.1 PG (ref 26.5–33)
MCHC RBC AUTO-ENTMCNC: 29.2 G/DL (ref 31.5–36.5)
MCV RBC AUTO: 69 FL (ref 78–100)
MONOCYTES # BLD AUTO: 0.9 10E9/L (ref 0–1.3)
MONOCYTES NFR BLD AUTO: 10.2 %
NEUTROPHILS # BLD AUTO: 7.4 10E9/L (ref 1.6–8.3)
NEUTROPHILS NFR BLD AUTO: 81.2 %
PLATELET # BLD AUTO: 335 10E9/L (ref 150–450)
POTASSIUM SERPL-SCNC: 3.9 MMOL/L (ref 3.5–5.1)
RBC # BLD AUTO: 4.37 10E12/L (ref 3.8–5.2)
SODIUM SERPL-SCNC: 140 MMOL/L (ref 134–144)
WBC # BLD AUTO: 9.2 10E9/L (ref 4–11)

## 2018-04-04 PROCEDURE — A9270 NON-COVERED ITEM OR SERVICE: HCPCS | Mod: GY | Performed by: FAMILY MEDICINE

## 2018-04-04 PROCEDURE — 99285 EMERGENCY DEPT VISIT HI MDM: CPT | Mod: 25 | Performed by: FAMILY MEDICINE

## 2018-04-04 PROCEDURE — 85025 COMPLETE CBC W/AUTO DIFF WBC: CPT | Performed by: FAMILY MEDICINE

## 2018-04-04 PROCEDURE — 25000132 ZZH RX MED GY IP 250 OP 250 PS 637: Mod: GY | Performed by: FAMILY MEDICINE

## 2018-04-04 PROCEDURE — 73030 X-RAY EXAM OF SHOULDER: CPT | Mod: LT

## 2018-04-04 PROCEDURE — 36415 COLL VENOUS BLD VENIPUNCTURE: CPT | Performed by: FAMILY MEDICINE

## 2018-04-04 PROCEDURE — 80048 BASIC METABOLIC PNL TOTAL CA: CPT | Performed by: FAMILY MEDICINE

## 2018-04-04 PROCEDURE — 99284 EMERGENCY DEPT VISIT MOD MDM: CPT | Mod: Z6 | Performed by: FAMILY MEDICINE

## 2018-04-04 PROCEDURE — 73590 X-RAY EXAM OF LOWER LEG: CPT | Mod: LT

## 2018-04-04 PROCEDURE — 85610 PROTHROMBIN TIME: CPT | Performed by: FAMILY MEDICINE

## 2018-04-04 PROCEDURE — 71100 X-RAY EXAM RIBS UNI 2 VIEWS: CPT | Mod: RT

## 2018-04-04 RX ORDER — ACETAMINOPHEN 325 MG/1
975 TABLET ORAL ONCE
Status: COMPLETED | OUTPATIENT
Start: 2018-04-04 | End: 2018-04-04

## 2018-04-04 RX ORDER — FERROUS GLUCONATE 324(38)MG
324 TABLET ORAL
Qty: 30 TABLET | Refills: 1 | Status: SHIPPED | OUTPATIENT
Start: 2018-04-04 | End: 2018-05-24

## 2018-04-04 RX ADMIN — ACETAMINOPHEN 975 MG: 325 TABLET, FILM COATED ORAL at 09:06

## 2018-04-04 NOTE — ED NOTES
Pt set up for breakfast. Pt took her morning dose of atenolol and asa per her daughter, provider notified.

## 2018-04-04 NOTE — ED PROVIDER NOTES
History     Chief Complaint   Patient presents with     Fall     Rib Pain     HPI Comments: This is a 86-year-old who probably has underlying dementia) patient is poor historian), essential hypertension and nonrheumatic aortic valve disorder who is coming to the emergency room this morning on a private car following a fall.  Patient is not able to give me any details about the fall but according to her daughter who lives with her she was apparently using the bathroom and she fell down in the bathroom.  Patient kicked a door of the bathroom to get her daughter's attention who found her lying in the bathroom floor.  Patient is having pleuritic significant right-sided rib cage pain which is acute.  She does have chronic left shoulder pain and is wincing during examination with the left upper extremity is manipulated at the shoulder joint.  She denies headache, neck, abdominal or back pains.  No weakness in the extremities or visual changes.   The history is provided by the patient, a relative and the EMS personnel (Here with her daughter.).       Problem List:    Patient Active Problem List    Diagnosis Date Noted     Profound impairment, one eye, impairment level not further specified 02/20/2018     Priority: Medium     Overview:   after valve surgery, attributed to blood clot       H/O heart valve replacement with porcine valve 02/11/2018     Priority: Medium     Long term current use of anticoagulant therapy 02/11/2018     Priority: Medium     Anticoagulation monitoring, INR range 2-3 06/14/2017     Priority: Medium     DNR no code (do not resuscitate) 06/14/2017     Priority: Medium     S/P AVR 06/14/2017     Priority: Medium     Heart valve replaced 09/01/2010     Priority: Medium     Hyperlipidemia 09/01/2010     Priority: Medium     Hypertension 09/01/2010     Priority: Medium        Past Medical History:    Past Medical History:   Diagnosis Date     Essential (primary) hypertension      Hyperlipidemia       Nonrheumatic aortic valve disorder      Osteoarthritis      Pneumonia        Past Surgical History:    Past Surgical History:   Procedure Laterality Date     APPENDECTOMY OPEN      childhood     HYSTERECTOMY TOTAL ABDOMINAL      1994,kept ovaries     OTHER SURGICAL HISTORY      2003,,AORTIC VALVE REPLACEMENT     TONSILLECTOMY      childhood       Family History:    Family History   Problem Relation Age of Onset     CANCER Mother      Cancer     Asthma Mother      Asthma     HEART DISEASE Father      Heart Disease,heart problems       Social History:  Marital Status:   [5]  Social History   Substance Use Topics     Smoking status: Never Smoker     Smokeless tobacco: Never Used     Alcohol use No        Medications:      ferrous gluconate (FERGON) 324 (38 FE) MG tablet   warfarin (COUMADIN) 2.5 MG tablet   warfarin (COUMADIN) 5 MG tablet   aspirin EC 81 MG EC tablet   atenolol (TENORMIN) 50 MG tablet   warfarin (COUMADIN) 2.5 MG tablet   warfarin (COUMADIN) 5 MG tablet   acetaminophen-codeine (TYLENOL #3) 300-30 MG per tablet         Review of Systems   Respiratory:        Right-sided rib pain s/p fall   All other systems reviewed and are negative.      Physical Exam   BP: 175/75  Heart Rate: 69  Temp: 96.7  F (35.9  C)  Resp: 16  SpO2: 95 %      Physical Exam   Constitutional: She is oriented to person, place, and time. She appears well-developed and well-nourished. No distress.   HENT:   Head: Normocephalic and atraumatic.   Eyes: EOM are normal. Pupils are equal, round, and reactive to light.   Neck: Normal range of motion. Neck supple.   No cervical soft tissue or bony tenderness on palpation   Cardiovascular: Normal rate, regular rhythm, normal heart sounds and intact distal pulses.    Pulmonary/Chest: She has no wheezes. She has no rales. She exhibits no tenderness.   Relatively decreased breath sounds on the left lung feel.   Right mid lateral rib cage tenderness on palpation without crepitance or  obvious indentation of flail wall   Abdominal: Soft. Bowel sounds are normal. She exhibits no distension. There is no tenderness. There is no rebound and no guarding.   Musculoskeletal:   Decreased range of motion involving left shoulder joint with tenderness on palpation otherwise exam of the extremities is unremarkable   Neurological: She is oriented to person, place, and time.   No focal neurologic findings       ED Course     Patient presents with acute right sided rib cage pleuritic pain status post fall.  This is consistent with rib fracture.  Patient is not a respiratory distress and is refusing pain medication when offered to her.  She has no focal neurologic findings or cervical or scalp injury to suggest cervical fracture or acute intracranial process.  She does have significant left shoulder tenderness.  However patient's daughter tells me patient has a chronic ongoing left shoulder pain for a long time.  Because of the fall will obtain x-ray of the shoulder as well as the right rib series.  At this point patient's care is signed off to Dr. Abbott during shift change pending x-ray results and for final disposition of the patient.    8:05 AM recheck patient and review x-rays with no obvious fracture awaiting radiology report.  Examination with tenderness in the anterolateral to lateral inferior ribs in the right but no crepitus no point tenderness and symmetric breath sounds.  Regular heart rate with 2/6 murmur.  Abdomen with some midepigastric tenderness, xiphoid tenderness, and right rib tenderness.  Hips are with normal internal/external rotation without tenderness and pelvis is nontender and stable.  Left knee is tender in the proximal medial tibia and there is an abrasion in the mid shin.  No knee effusion.  We will get tib-fib on the left.  Discussion with daughter and patient and patient sometimes uses a cane but no other amatory supports.  Daughter and granddaughter are available to help with  mobility if needs are changing.    ED Course     Procedures               Critical Care time:  none               Results for orders placed or performed during the hospital encounter of 04/04/18 (from the past 24 hour(s))   XR Ribs Right 2 Views    Narrative    PROCEDURE: XR RIBS RT 2 VW 4/4/2018 6:51 AM    HISTORY: traumatic right sided rib pain;     COMPARISONS: None.    TECHNIQUE: 3 views of the right ribs    FINDINGS: No acute fracture or destructive osseous lesions.         Impression    IMPRESSION: No acute fracture.    MARTIN MENENDEZ MD   XR Shoulder Left 2 Views    Narrative    PROCEDURE:  XR SHOULDER 2 VIEW LEFT    HISTORY: Traumatic left shoulder pain;     COMPARISON:  None.    TECHNIQUE:  2 views of the left shoulder were obtained.    FINDINGS:  No fracture or dislocation is identified. The joint spaces  are preserved.        Impression    IMPRESSION: No acute fracture.      MARTIN MENENDEZ MD   INR   Result Value Ref Range    INR 2.40 (H) 0 - 1.3   CBC with platelets differential   Result Value Ref Range    WBC 9.2 4.0 - 11.0 10e9/L    RBC Count 4.37 3.8 - 5.2 10e12/L    Hemoglobin 8.8 (L) 11.7 - 15.7 g/dL    Hematocrit 30.1 (L) 35.0 - 47.0 %    MCV 69 (L) 78 - 100 fl    MCH 20.1 (L) 26.5 - 33.0 pg    MCHC 29.2 (L) 31.5 - 36.5 g/dL    RDW 17.2 (H) 10.0 - 15.0 %    Platelet Count 335 150 - 450 10e9/L    Diff Method Automated Method     % Neutrophils 81.2 %    % Lymphocytes 7.3 %    % Monocytes 10.2 %    % Eosinophils 0.7 %    % Basophils 0.2 %    % Immature Granulocytes 0.4 %    Absolute Neutrophil 7.4 1.6 - 8.3 10e9/L    Absolute Lymphocytes 0.7 (L) 0.8 - 5.3 10e9/L    Absolute Monocytes 0.9 0.0 - 1.3 10e9/L    Absolute Eosinophils 0.1 0.0 - 0.7 10e9/L    Absolute Basophils 0.0 0.0 - 0.2 10e9/L    Abs Immature Granulocytes 0.0 0 - 0.4 10e9/L   Basic metabolic panel   Result Value Ref Range    Sodium 140 134 - 144 mmol/L    Potassium 3.9 3.5 - 5.1 mmol/L    Chloride 101 98 - 107 mmol/L    Carbon Dioxide 32  (H) 21 - 31 mmol/L    Anion Gap 7 3 - 14 mmol/L    Glucose 114 (H) 70 - 105 mg/dL    Urea Nitrogen 23 7 - 25 mg/dL    Creatinine 0.71 0.60 - 1.20 mg/dL    GFR Estimate 78 >60 mL/min/1.7m2    GFR Estimate If Black >90 >60 mL/min/1.7m2    Calcium 9.8 8.6 - 10.3 mg/dL   XR Tibia & Fibula Left 2 Views    Narrative    PROCEDURE:  XR TIBIA & FIBULA LT 2 VW    HISTORY: fall this morning in the bathroom.;     COMPARISON:  None.    TECHNIQUE:  2 views of the left tibia and fibula were obtained.    FINDINGS:  No fracture or dislocation is identified. There are  degenerative changes of the knee and ankle.        Impression    IMPRESSION: No acute fracture.      MARTIN MENENDEZ MD       Medications   acetaminophen (TYLENOL) tablet 975 mg (975 mg Oral Given 4/4/18 0906)     9:17 AM discuss new microcytic anemia previous hgb comparison in August was over 11.  Patient appears well compensated right now.  Her INR is therapeutic and not supratherapeutic anymore.  She will need follow-up hemoglobin and 2 days in clinic.  Jeremi Abbott MD.    Assessments & Plan (with Medical Decision Making)   86-year-old female with fall in the bathroom this morning suffering a bruise to her right anterior lateral inferior ribs and contusion to her left lower leg with mild abrasion.  She had been supratherapeutic on her Coumadin 2 weeks ago and her INR is now therapeutic, however I do find microcytic anemia that is new from comparison in August.  She was started on iron pill a day and have recheck in clinic in 2 days to make sure she is not drifting down faster.  Discussed her anemia may have contributed to her fall, she appears well compensated and does not feel dizzy or woozy with position changes, shows no hypotension.    I have reviewed the nursing notes.    I have reviewed the findings, diagnosis, plan and need for follow up with the patient.       New Prescriptions    FERROUS GLUCONATE (FERGON) 324 (38 FE) MG TABLET    Take 1 tablet (324  mg) by mouth daily (with breakfast)       Final diagnoses:   Fall, accidental, initial encounter   Rib contusion, right, initial encounter   Contusion of left lower leg, initial encounter   Microcytic anemia   H/O heart valve replacement with porcine valve   Anticoagulation monitoring, INR range 2-3   Long term current use of anticoagulant therapy       4/4/2018   [facility]     Jeff Livingston MD  04/04/18 0643       Jeremi Abbott MD  04/04/18 0895

## 2018-04-04 NOTE — ED NOTES
Patient presents with daughter after a fall at home, c/o right rib pain. Does take coumadin. Denies hitting her head. Patient lives with daughter.

## 2018-04-04 NOTE — DISCHARGE INSTRUCTIONS
Dear Ms. Peters,  It was nice to see you.  I'm sorry you had a fall.      As we talked, your x-rays don't show a broken bone but you will still be sore for several weeks.      In addition we found that your hemoglobin has dropped to 8.8 from last summer.  This is likely a gradual process but you will need to have follow up in the clinic for recheck in a couple days.    Use ice for pain and tylenol 650-1000 mg up to 4 times a day for pain.    We will have you take iron once a day for now.    I hope you are better soon,  Sincerely,  Dr. Miguel Angel Abbott

## 2018-04-04 NOTE — ED AVS SNAPSHOT
[facility]    [address]    [place] [address]    Phone:  [phone]    Fax:  [phone]                                       Milagros Peters   MRN: [id number]    Department:  [facility]   Date of Visit:  4/4/2018           After Visit Summary Signature Page     I have received my discharge instructions, and my questions have been answered. I have discussed any challenges I see with this plan with the nurse or doctor.    ..........................................................................................................................................  Patient/Patient Representative Signature      ..........................................................................................................................................  Patient Representative Print Name and Relationship to Patient    ..................................................               ................................................  Date                                            Time    ..........................................................................................................................................  Reviewed by Signature/Title    ...................................................              ..............................................  Date                                                            Time

## 2018-04-04 NOTE — ED AVS SNAPSHOT
[facility]    [address]    [place] [address]    Phone:  [phone]    Fax:  [phone]                                       Milagros Peters   MRN: [id number]    Department:  [facility]   Date of Visit:  4/4/2018           Patient Information     Date Of Birth          12/23/1931        Your diagnoses for this visit were:     Fall, accidental, initial encounter     Rib contusion, right, initial encounter     Contusion of left lower leg, initial encounter     Microcytic anemia     H/O heart valve replacement with porcine valve     Anticoagulation monitoring, INR range 2-3     Long term current use of anticoagulant therapy        You were seen by Jeff Livingston MD.      Follow-up Information     Follow up with Raisa Madrid MD. Schedule an appointment as soon as possible for a visit in 2 days.    Specialty:  Family Practice    Why:  for Hemoglobin recheck.    Contact information:    [address]  [place] [address]  [phone]          Discharge Instructions       Dear Ms. Peters,  It was nice to see you.  I'm sorry you had a fall.      As we talked, your x-rays don't show a broken bone but you will still be sore for several weeks.      In addition we found that your hemoglobin has dropped to 8.8 from last summer.  This is likely a gradual process but you will need to have follow up in the clinic for recheck in a couple days.    Use ice for pain and tylenol 650-1000 mg up to 4 times a day for pain.    We will have you take iron once a day for now.    I hope you are better soon,  Sincerely,  Dr. Miguel Angel Abbott        Discharge References/Attachments     CHEST WALL CONTUSION (ENGLISH)    LOWER EXTREMITY CONTUSION (ENGLISH)    RIB CONTUSION (ENGLISH)    ANEMIA, IRON-DEFICIENCY (ADULT) (ENGLISH)      Your next 10 appointments already scheduled     Apr 06, 2018  9:15 AM CDT   Office Visit with Teo Morgan MD   [facility]    [address]  [place] [address] [phone]           Bring a current list of meds and any records pertaining to this visit. For Physicals, please bring immunization records and any forms needing to be filled out. Please arrive 10 minutes early to complete paperwork.            Apr 11, 2018  9:30 AM CDT   Anticoagulation Visit with GH ANTI COAG 1   [facility]    [address]  [place] [address] [phone]              24 Hour Appointment Hotline       To make an appointment at any [facility], call [phone] ([phone]). If you don't have a family doctor or clinic, we will help you find one. [facility] clinics are conveniently located to serve the needs of you and your family.             Review of your medicines      START taking        Dose / Directions Last dose taken    ferrous gluconate 324 (38 FE) MG tablet   Commonly known as:  FERGON   Dose:  324 mg   Quantity:  30 tablet        Take 1 tablet (324 mg) by mouth daily (with breakfast)   Refills:  1          Our records show that you are taking the medicines listed below. If these are incorrect, please call your family doctor or clinic.        Dose / Directions Last dose taken    acetaminophen-codeine 300-30 MG per tablet   Commonly known as:  TYLENOL #3   Dose:  1 tablet        Take 1 tablet by mouth every 4 hours as needed   Refills:  0        aspirin EC 81 MG EC tablet   Dose:  81 mg        Take 81 mg by mouth daily with food   Refills:  0        TENORMIN 50 MG tablet   Dose:  50 mg   Generic drug:  atenolol        Take 50 mg by mouth 2 times daily   Refills:  0        * warfarin 2.5 MG tablet   Commonly known as:  COUMADIN        Take 5 mg x 2 days/week and 2.5 mg x 5 days/week.  Or as directed by the [facility].   Refills:  0        * warfarin 5 MG tablet   Commonly known as:  COUMADIN        Take 5  mg x 2 days/week and 2.5 mg x 5 days/week.  Or as directed by the [facility].   Refills:  0        * warfarin 2.5 MG tablet   Commonly known as:  COUMADIN        Take 5 mg x 2 days/week and 2.5 mg x 5 days/week.  Or as directed by the [facility].   Refills:  0        * warfarin 5 MG tablet   Commonly known as:  COUMADIN        Take 5 mg x 2 days/week and 2.5 mg x 5 days/week.  Or as directed by the [facility].   Refills:  0        * Notice:  This list has 4 medication(s) that are the same as other medications prescribed for you. Read the directions carefully, and ask your doctor or other care provider to review them with you.            Prescriptions were sent or printed at these locations (1 Prescription)                   [facility] [address] [place], [place] - [address] AT [address] [facility] [address]   [address], [facility] [address]    Telephone:  [phone]   Fax:  [phone]   Hours:                  E-Prescribed (1 of 1)         ferrous gluconate (FERGON) 324 (38 FE) MG tablet                Procedures and tests performed during your visit     Basic metabolic panel    CBC with platelets differential    INR    Nursing Communication 1    XR Ribs Right 2 Views    XR Shoulder Left 2 Views    XR Tibia & Fibula Left 2 Views      Orders Needing Specimen Collection     None      Pending Results     No orders found from 4/2/2018 to 4/5/2018.            Pending Culture Results     No orders found from 4/2/2018 to 4/5/2018.            Pending Results Instructions     If you had any lab results that were not finalized at the time of your Discharge, you can call the ED Lab Result RN at [phone]. You will be contacted by this team for any positive Lab results or changes in treatment. The nurses are available 7 days a week from 10A to 6:30P.  You can leave a message 24 hours per day and they will return your call.        Thank you for choosing [facility]       Thank you for choosing  "[facility] for your care. Our goal is always to provide you with excellent care. Hearing back from our patients is one way we can continue to improve our services. Please take a few minutes to complete the written survey that you may receive in the mail after you visit with us. Thank you!        [facility] Information     [facility] lets you send messages to your doctor, view your test results, renew your prescriptions, schedule appointments and more. To sign up, go to [email or web address] . Click on \"Log in\" on the left side of the screen, which will take you to the Welcome page. Then click on \"Sign up Now\" on the right side of the page.     You will be asked to enter the access code listed below, as well as some personal information. Please follow the directions to create your username and password.     Your access code is: [email or web address]  Expires: 7/3/2018  9:26 AM     Your access code will  in 90 days. If you need help or a new code, please call your [facility] clinic or [phone].        Care EveryWhere ID     This is your Care EveryWhere ID. This could be used by other organizations to access your [facility] medical records  [id number]        Equal Access to Services     EDILBERTO DONOHUE : Nile Key, ariella maynard, qapatricia kabri kim, charis field. So [facility] [phone].    ATENCIÓN: Si habla español, tiene a singh disposición servicios gratuitos de asistencia lingüística. Llame al [phone].    We comply with applicable federal civil rights laws and [place] laws. We do not discriminate on the basis of race, color, national origin, age, disability, sex, sexual orientation, or gender identity.            After Visit Summary       This is your record. Keep this with you and show to your community pharmacist(s) and doctor(s) at your next visit.                  "

## 2018-04-06 ENCOUNTER — OFFICE VISIT (OUTPATIENT)
Dept: FAMILY MEDICINE | Facility: OTHER | Age: 83
End: 2018-04-06
Attending: FAMILY MEDICINE
Payer: MEDICARE

## 2018-04-06 VITALS
DIASTOLIC BLOOD PRESSURE: 52 MMHG | SYSTOLIC BLOOD PRESSURE: 144 MMHG | BODY MASS INDEX: 24.82 KG/M2 | HEART RATE: 56 BPM | WEIGHT: 129.2 LBS

## 2018-04-06 DIAGNOSIS — D50.0 IRON DEFICIENCY ANEMIA DUE TO CHRONIC BLOOD LOSS: Primary | ICD-10-CM

## 2018-04-06 DIAGNOSIS — R41.3 MEMORY LOSS: ICD-10-CM

## 2018-04-06 LAB
FERRITIN SERPL-MCNC: 341 NG/ML (ref 23.9–336.2)
IRON SERPL-MCNC: 15 UG/DL (ref 50–212)

## 2018-04-06 PROCEDURE — 82728 ASSAY OF FERRITIN: CPT | Performed by: FAMILY MEDICINE

## 2018-04-06 PROCEDURE — G0463 HOSPITAL OUTPT CLINIC VISIT: HCPCS

## 2018-04-06 PROCEDURE — 99215 OFFICE O/P EST HI 40 MIN: CPT | Performed by: FAMILY MEDICINE

## 2018-04-06 PROCEDURE — 83540 ASSAY OF IRON: CPT | Performed by: FAMILY MEDICINE

## 2018-04-06 PROCEDURE — 36415 COLL VENOUS BLD VENIPUNCTURE: CPT | Performed by: FAMILY MEDICINE

## 2018-04-06 RX ORDER — OMEPRAZOLE 40 MG/1
40 CAPSULE, DELAYED RELEASE ORAL DAILY
Qty: 30 CAPSULE | Refills: 1 | Status: SHIPPED | OUTPATIENT
Start: 2018-04-06 | End: 2018-04-12

## 2018-04-06 ASSESSMENT — PAIN SCALES - GENERAL: PAINLEVEL: NO PAIN (0)

## 2018-04-06 NOTE — MR AVS SNAPSHOT
"              After Visit Summary   4/6/2018    Milagros Peters    MRN: [id number]           Patient Information     Date Of Birth          12/23/1931        Visit Information        Provider Department      4/6/2018 9:15 AM Teo Morgan MD [facility]        Today's Diagnoses     Iron deficiency anemia due to chronic blood loss    -  1    Memory loss           Follow-ups after your visit        Your next 10 appointments already scheduled     Apr 11, 2018  9:30 AM CDT   Anticoagulation Visit with GH ANTI COAG 1   [facility] ([facility])    [address]  [place] [address] [phone]              Who to contact     If you have questions or need follow up information about today's clinic visit or your schedule please contact [facility] directly at [phone].  Normal or non-critical lab and imaging results will be communicated to you by Bubokhart, letter or phone within 4 business days after the clinic has received the results. If you do not hear from us within 7 days, please contact the clinic through Bubokhart or phone. If you have a critical or abnormal lab result, we will notify you by phone as soon as possible.  Submit refill requests through jigl or call your pharmacy and they will forward the refill request to us. Please allow 3 business days for your refill to be completed.          Additional Information About Your Visit        MyChart Information     jigl lets you send messages to your doctor, view your test results, renew your prescriptions, schedule appointments and more. To sign up, go to [email or web address] . Click on \"Log in\" on the left side of the screen, which will take you to the Welcome page. Then click on \"Sign up Now\" on the right side of the page.     You will be asked to enter the access code listed below, as well as some personal information. Please follow the directions to " create your username and password.     Your access code is: [email or web address]  Expires: 7/3/2018  9:26 AM     Your access code will  in 90 days. If you need help or a new code, please call your [place] clinic or [phone].        Care EveryWhere ID     This is your Care EveryWhere ID. This could be used by other organizations to access your [facility] medical records  [id number]        Your Vitals Were     Pulse BMI (Body Mass Index)                56 24.82 kg/m2           Blood Pressure from Last 3 Encounters:   18 144/52   18 194/72   17 140/80    Weight from Last 3 Encounters:   18 129 lb 3.2 oz (58.6 kg)   17 141 lb 12.8 oz (64.3 kg)   10/30/17 140 lb 12.8 oz (63.9 kg)              We Performed the Following     Ferritin     Iron          Today's Medication Changes          These changes are accurate as of 18 11:59 PM.  If you have any questions, ask your nurse or doctor.               Start taking these medicines.        Dose/Directions    omeprazole 40 MG capsule   Commonly known as:  priLOSEC   Used for:  Iron deficiency anemia due to chronic blood loss   Started by:  Teo Morgan MD        Dose:  40 mg   Take 1 capsule (40 mg) by mouth daily Take 30-60 minutes before a meal.   Quantity:  30 capsule   Refills:  1            Where to get your medicines      These medications were sent to [id number] - [place], [place] - [address]   [address], [facility] [address]     Phone:  [phone]     omeprazole 40 MG capsule                Primary Care Provider Office Phone # Fax #    Raisa Madrid [phone]       [address] [facility] [address]        Equal Access to Services     CHANDA DONOHUE AH: Nile mckee Sogeremias, waaxda luqadaha, qaybta kaalmada anabelleyaberna, charis field. So [facility] [phone].    ATENCIÓN: Si chanel ramirez, tiene a singh disposición  servicios gratuitos de asistencia lingüística. Fortino marcos [phone].    We comply with applicable federal civil rights laws and [place] laws. We do not discriminate on the basis of race, color, national origin, age, disability, sex, sexual orientation, or gender identity.            Thank you!     Thank you for choosing [facility] hospitals  for your care. Our goal is always to provide you with excellent care. Hearing back from our patients is one way we can continue to improve our services. Please take a few minutes to complete the written survey that you may receive in the mail after your visit with us. Thank you!             Your Updated Medication List - Protect others around you: Learn how to safely use, store and throw away your medicines at www.disposemymeds.org.          This list is accurate as of 4/6/18 11:59 PM.  Always use your most recent med list.                   Brand Name Dispense Instructions for use Diagnosis    acetaminophen-codeine 300-30 MG per tablet    TYLENOL #3     Take 1 tablet by mouth every 4 hours as needed        aspirin EC 81 MG EC tablet      Take 81 mg by mouth daily with food        ferrous gluconate 324 (38 FE) MG tablet    FERGON    30 tablet    Take 1 tablet (324 mg) by mouth daily (with breakfast)        omeprazole 40 MG capsule    priLOSEC    30 capsule    Take 1 capsule (40 mg) by mouth daily Take 30-60 minutes before a meal.    Iron deficiency anemia due to chronic blood loss       TENORMIN 50 MG tablet   Generic drug:  atenolol      Take 50 mg by mouth 2 times daily        * warfarin 2.5 MG tablet    COUMADIN     Take 5 mg x 2 days/week and 2.5 mg x 5 days/week.  Or as directed by the [facility].        * warfarin 5 MG tablet    COUMADIN     Take 5 mg x 2 days/week and 2.5 mg x 5 days/week.  Or as directed by the [facility].        * warfarin 2.5 MG tablet    COUMADIN     Take 5 mg x 2 days/week and 2.5 mg x 5 days/week.  Or as directed by the  [facility].        * warfarin 5 MG tablet    COUMADIN     Take 5 mg x 2 days/week and 2.5 mg x 5 days/week.  Or [facility].        * Notice:  This list has 4 medication(s) that are the same as other medications prescribed for you. Read the directions carefully, and ask your doctor or other care provider to review them with you.

## 2018-04-06 NOTE — NURSING NOTE
Patient here with daughter for follow up from ED and fall with right rib contusion. Katerina Valdivia LPN .......................4/6/2018  9:24 AM

## 2018-04-06 NOTE — LETTER
April 9, 2018      Milagros Peters  [address] [place] [address] [address] [place] [address]        Dear ,    We are writing to inform you of your test results.    Test results indicate you may require additional follow up, see comment below.  Again I would recommend a follow-up appointment with our general surgeon to discuss further evaluation of your anemia.    Resulted Orders   Ferritin   Result Value Ref Range    Ferritin 341 (H) 23.9 - 336.2 ng/mL   Iron   Result Value Ref Range    Iron 15 (L) 50 - 212 ug/dL       If you have any questions or concerns, please call the clinic at the number listed above.       Sincerely,        Teo Morgan MD

## 2018-04-09 NOTE — PROGRESS NOTES
SUBJECTIVE:   Milagros Peters is a 86 year old female who presents to clinic today for the following health issues: Follow-up ER    HPI Comments: Patient arrives here for follow-up ER.  She is with her daughter was present during the exam and interview.  She had taken a fall and was found to be anemic.  Her hemoglobin last year was 11.2 in August 2017 and then most recently 8.8.  Her MCV also dropped.  Patient is also noticed a 30 pound weight loss.  Also concerned about memory loss.  Patient reports that she does not have an appetite.  That she will eat small amounts of food.  But very insistent that she eats the amount she wants to eat.        Patient Active Problem List    Diagnosis Date Noted     Iron deficiency anemia due to chronic blood loss 04/06/2018     Priority: Medium     Memory loss 04/06/2018     Priority: Medium     Profound impairment, one eye, impairment level not further specified 02/20/2018     Priority: Medium     Overview:   after valve surgery, attributed to blood clot       H/O heart valve replacement with porcine valve 02/11/2018     Priority: Medium     Long term current use of anticoagulant therapy 02/11/2018     Priority: Medium     Anticoagulation monitoring, INR range 2-3 06/14/2017     Priority: Medium     DNR no code (do not resuscitate) 06/14/2017     Priority: Medium     S/P AVR 06/14/2017     Priority: Medium     Heart valve replaced 09/01/2010     Priority: Medium     Hyperlipidemia 09/01/2010     Priority: Medium     Hypertension 09/01/2010     Priority: Medium     Past Medical History:   Diagnosis Date     Essential (primary) hypertension     No Comments Provided     Hyperlipidemia     No Comments Provided     Nonrheumatic aortic valve disorder     No Comments Provided     Osteoarthritis     No Comments Provided     Pneumonia     5/17/2012      Family History   Problem Relation Age of Onset     CANCER Mother      Cancer     Asthma Mother      Asthma     HEART DISEASE Father       Heart Disease,heart problems     Social History     Social History Narrative    [place]  Is preferred name.     Okay to talk to Sarah Suresh  [phone] in a medical emergency;  Daughter     Moved to [place].  Lives with daughter in [facility] [place] [facility].   [place] best hot dogs.   [employer]    a;   [employer] [address] in [facility] ;  Worked at [employer], telephone company store.      [place] [employer] [place]  Approximately a month       1952;  Ran numbers          2 sons in [place]  daughter in the area         Current Outpatient Prescriptions   Medication Sig Dispense Refill     omeprazole (PRILOSEC) 40 MG capsule Take 1 capsule (40 mg) by mouth daily Take 30-60 minutes before a meal. 30 capsule 1     ferrous gluconate (FERGON) 324 (38 FE) MG tablet Take 1 tablet (324 mg) by mouth daily (with breakfast) 30 tablet 1     aspirin EC 81 MG EC tablet Take 81 mg by mouth daily with food       atenolol (TENORMIN) 50 MG tablet Take 50 mg by mouth 2 times daily       warfarin (COUMADIN) 2.5 MG tablet Take 5 mg x 2 days/week and 2.5 mg x 5 days/week.  Or as directed by the [facility].       warfarin (COUMADIN) 5 MG tablet Take 5 mg x 2 days/week and 2.5 mg x 5 days/week.  Or as directed by the [facility].       warfarin (COUMADIN) 2.5 MG tablet Take 5 mg x 2 days/week and 2.5 mg x 5 days/week.  Or as directed by the [facility].       warfarin (COUMADIN) 5 MG tablet Take 5 mg x 2 days/week and 2.5 mg x 5 days/week.  Or as directed by the [facility].       acetaminophen-codeine (TYLENOL #3) 300-30 MG per tablet Take 1 tablet by mouth every 4 hours as needed       Allergies   Allergen Reactions     Neosporin [Neomycin-Polymyx-Gramicid]        Review of Systems     OBJECTIVE:     /52  Pulse 56  Wt 129 lb 3.2 oz (58.6 kg)  BMI 24.82 kg/m2  Body mass index is 24.82 kg/(m^2).  Physical Exam   Constitutional: She appears well-developed.   Mini-Mental  status exam 24 out of 30   HENT:   Head: Normocephalic and atraumatic.   Right Ear: External ear normal.   Left Ear: External ear normal.   Cardiovascular: Normal rate, regular rhythm and normal heart sounds.    Pulmonary/Chest: Effort normal and breath sounds normal.   Abdominal: Soft. She exhibits no distension and no mass. There is no tenderness. There is no rebound and no guarding.   Musculoskeletal: Normal range of motion.   Neurological: She is alert.   Psychiatric: She has a normal mood and affect.       Diagnostic Test Results:  Results for orders placed or performed in visit on 04/06/18   Ferritin   Result Value Ref Range    Ferritin 341 (H) 23.9 - 336.2 ng/mL   Iron   Result Value Ref Range    Iron 15 (L) 50 - 212 ug/dL       ASSESSMENT/PLAN:         1. Iron deficiency anemia due to chronic blood loss  Ferritin likely reactive.  I had a long discussion with the patient and her daughter concerning the likely cause of anemia.  Likely a GI losses either esophageal or gastric or colon.  Recommended surgical consultation to see if she is a candidate for scopes.  Patient states that she wants to think about it.  They will get back to me if they would like to have a surgical consult.  Patient herself states that she is old.   - Ferritin  - Iron  - omeprazole (PRILOSEC) 40 MG capsule; Take 1 capsule (40 mg) by mouth daily Take 30-60 minutes before a meal.  Dispense: 30 capsule; Refill: 1    2. Memory loss  Likely Alzheimer's.  Discussed there are medications out there to theoretically slow the progression.  Advised him that they certainly could look into it on the Internet.  At this time they would like to hold off on any medication such as Aricept.  If they changes her mind they will call.  Greater than 45 minutes spent with the patient her daughter discussing potential colon cancer.  Memory issues.        Teo Morgan MD  [facility]

## 2018-04-11 ENCOUNTER — ANTICOAGULATION THERAPY VISIT (OUTPATIENT)
Dept: ANTICOAGULATION | Facility: OTHER | Age: 83
End: 2018-04-11
Attending: FAMILY MEDICINE
Payer: MEDICARE

## 2018-04-11 DIAGNOSIS — Z95.3 H/O HEART VALVE REPLACEMENT WITH PORCINE VALVE: ICD-10-CM

## 2018-04-11 DIAGNOSIS — Z79.01 LONG TERM CURRENT USE OF ANTICOAGULANT THERAPY: ICD-10-CM

## 2018-04-11 LAB — INR POINT OF CARE: 4.3 (ref 2.5–3.5)

## 2018-04-11 PROCEDURE — 85610 PROTHROMBIN TIME: CPT | Mod: QW,ZL

## 2018-04-11 PROCEDURE — 36416 COLLJ CAPILLARY BLOOD SPEC: CPT | Mod: ZL

## 2018-04-11 NOTE — PROGRESS NOTES
ANTICOAGULATION FOLLOW-UP CLINIC VISIT    Patient Name:  Milagros Peters  Date:  4/11/2018  Contact Type:  Face to Face    SUBJECTIVE:     Patient Findings     Positives Change in medications (started iron for low hgb ), Bruising (had a fall  bruising on left side)           OBJECTIVE    INR [facility]   Date Value Ref Range Status   04/11/2018 4.3 (A) 2.5 - 3.5 Final       ASSESSMENT / PLAN  INR assessment SUPRA    Recheck INR In: 2 WEEKS    INR Location Clinic      Anticoagulation Summary as of 4/11/2018     INR goal 2.5-3.5   Today's INR 4.3!   Maintenance plan 5 mg (5 mg x 1) on Mon; 2.5 mg (2.5 mg x 1) all other days   Full instructions 5 mg on Mon; 2.5 mg all other days   Weekly total 20 mg   Plan last modified Arabella Longo, RN (4/11/2018)   Next INR check 4/24/2018   Priority INR   Target end date Indefinite    Indications   H/O heart valve replacement with porcine valve [Z95.3]  Long term current use of anticoagulant therapy [Z79.01]         Anticoagulation Episode Summary     INR check location     Preferred lab     Send INR reminders to  INR    Comments       Anticoagulation Care Providers     Provider Role Specialty Phone number    Raisa Madrid MD [facility] [phone]            See the Encounter Report to view Anticoagulation Flowsheet and Dosing Calendar (Go to Encounters tab in chart review, and find the Anticoagulation Therapy Visit)        Arabella Longo RN

## 2018-04-11 NOTE — MR AVS SNAPSHOT
Milagros Peters   4/11/2018 9:30 AM   Anticoagulation Therapy Visit    Description:  86 year old female   Provider:  GH ANTI COAG 1   Department:  [facility]           INR as of 4/11/2018     Today's INR 4.3!      Anticoagulation Summary as of 4/11/2018     INR goal 2.5-3.5   Today's INR 4.3!   Full instructions 5 mg on Mon; 2.5 mg all other days   Next INR check 4/24/2018    Indications   H/O heart valve replacement with porcine valve [Z95.3]  Long term current use of anticoagulant therapy [Z79.01]         Description     Decrease dose and recheck in 2 weeks.  ..............Arabella Longo RN.............  4/11/2018    9:15 AM          Your next Anticoagulation Clinic appointment(s)     Apr 11, 2018  9:30 AM CDT   Anticoagulation Visit with PATRICK ANTI TYRELG 1   [facility] ([facility])    [address]  [place] [address]   [phone]              April 2018 Details    Sun Mon Tue Wed Thu Fri Sat     1               2               3               4               5               6               7                 8               9               10               11      2.5 mg   See details      12      2.5 mg         13      2.5 mg         14      2.5 mg           15      2.5 mg         16      5 mg         17      2.5 mg         18      2.5 mg         19      2.5 mg         20      2.5 mg         21      2.5 mg           22      2.5 mg         23      5 mg         24            25               26               27               28                 29               30                     Date Details   04/11 This INR check       Date of next INR:  4/24/2018         How to take your warfarin dose     To take:  2.5 mg Take 1 of the 2.5 mg tablets.    To take:  5 mg Take 1 of the 5 mg tablets.

## 2018-04-12 ENCOUNTER — HOSPITAL ENCOUNTER (EMERGENCY)
Facility: OTHER | Age: 83
Discharge: HOME | End: 2018-04-12
Attending: EMERGENCY MEDICINE | Admitting: EMERGENCY MEDICINE
Payer: MEDICARE

## 2018-04-12 ENCOUNTER — APPOINTMENT (OUTPATIENT)
Dept: CT IMAGING | Facility: OTHER | Age: 83
End: 2018-04-12
Attending: EMERGENCY MEDICINE
Payer: MEDICARE

## 2018-04-12 VITALS
SYSTOLIC BLOOD PRESSURE: 153 MMHG | RESPIRATION RATE: 16 BRPM | TEMPERATURE: 97.9 F | OXYGEN SATURATION: 98 % | DIASTOLIC BLOOD PRESSURE: 58 MMHG

## 2018-04-12 DIAGNOSIS — Z79.01 LONG TERM CURRENT USE OF ANTICOAGULANT THERAPY: ICD-10-CM

## 2018-04-12 DIAGNOSIS — Z95.3 H/O HEART VALVE REPLACEMENT WITH PORCINE VALVE: Primary | ICD-10-CM

## 2018-04-12 DIAGNOSIS — K57.32 DIVERTICULITIS OF COLON: ICD-10-CM

## 2018-04-12 LAB
ALBUMIN SERPL-MCNC: 3.1 G/DL (ref 3.5–5.7)
ALBUMIN UR-MCNC: NEGATIVE MG/DL
ALP SERPL-CCNC: 86 U/L (ref 34–104)
ALT SERPL W P-5'-P-CCNC: 9 U/L (ref 7–52)
ANION GAP SERPL CALCULATED.3IONS-SCNC: 7 MMOL/L (ref 3–14)
APPEARANCE UR: CLEAR
AST SERPL W P-5'-P-CCNC: 15 U/L (ref 13–39)
BASOPHILS # BLD AUTO: 0 10E9/L (ref 0–0.2)
BASOPHILS NFR BLD AUTO: 0.1 %
BILIRUB SERPL-MCNC: 0.5 MG/DL (ref 0.3–1)
BILIRUB UR QL STRIP: NEGATIVE
BUN SERPL-MCNC: 16 MG/DL (ref 7–25)
CALCIUM SERPL-MCNC: 9.2 MG/DL (ref 8.6–10.3)
CHLORIDE SERPL-SCNC: 101 MMOL/L (ref 98–107)
CO2 SERPL-SCNC: 30 MMOL/L (ref 21–31)
COLOR UR AUTO: YELLOW
CREAT SERPL-MCNC: 0.71 MG/DL (ref 0.6–1.2)
DIFFERENTIAL METHOD BLD: ABNORMAL
EOSINOPHIL # BLD AUTO: 0 10E9/L (ref 0–0.7)
EOSINOPHIL NFR BLD AUTO: 0.1 %
ERYTHROCYTE [DISTWIDTH] IN BLOOD BY AUTOMATED COUNT: 17.9 % (ref 10–15)
GFR SERPL CREATININE-BSD FRML MDRD: 78 ML/MIN/1.7M2
GLUCOSE SERPL-MCNC: 112 MG/DL (ref 70–105)
GLUCOSE UR STRIP-MCNC: NEGATIVE MG/DL
HCT VFR BLD AUTO: 29 % (ref 35–47)
HGB BLD-MCNC: 8.7 G/DL (ref 11.7–15.7)
HGB UR QL STRIP: ABNORMAL
IMM GRANULOCYTES # BLD: 0.1 10E9/L (ref 0–0.4)
IMM GRANULOCYTES NFR BLD: 0.4 %
KETONES UR STRIP-MCNC: NEGATIVE MG/DL
LEUKOCYTE ESTERASE UR QL STRIP: NEGATIVE
LYMPHOCYTES # BLD AUTO: 0.7 10E9/L (ref 0.8–5.3)
LYMPHOCYTES NFR BLD AUTO: 4.7 %
MCH RBC QN AUTO: 20.4 PG (ref 26.5–33)
MCHC RBC AUTO-ENTMCNC: 30 G/DL (ref 31.5–36.5)
MCV RBC AUTO: 68 FL (ref 78–100)
MONOCYTES # BLD AUTO: 1.3 10E9/L (ref 0–1.3)
MONOCYTES NFR BLD AUTO: 8.8 %
NEUTROPHILS # BLD AUTO: 12.4 10E9/L (ref 1.6–8.3)
NEUTROPHILS NFR BLD AUTO: 85.9 %
NITRATE UR QL: NEGATIVE
NON-SQ EPI CELLS #/AREA URNS LPF: ABNORMAL /LPF
PH UR STRIP: 5.5 PH (ref 5–7)
PLATELET # BLD AUTO: 360 10E9/L (ref 150–450)
POTASSIUM SERPL-SCNC: 4 MMOL/L (ref 3.5–5.1)
PROT SERPL-MCNC: 6.4 G/DL (ref 6.4–8.9)
RBC # BLD AUTO: 4.26 10E12/L (ref 3.8–5.2)
RBC #/AREA URNS AUTO: ABNORMAL /HPF
SODIUM SERPL-SCNC: 138 MMOL/L (ref 134–144)
SOURCE: ABNORMAL
SP GR UR STRIP: <1.005 (ref 1–1.03)
UROBILINOGEN UR STRIP-ACNC: 0.2 EU/DL (ref 0.2–1)
WBC # BLD AUTO: 14.5 10E9/L (ref 4–11)
WBC #/AREA URNS AUTO: ABNORMAL /HPF

## 2018-04-12 PROCEDURE — 96365 THER/PROPH/DIAG IV INF INIT: CPT | Mod: XU | Performed by: EMERGENCY MEDICINE

## 2018-04-12 PROCEDURE — 36415 COLL VENOUS BLD VENIPUNCTURE: CPT | Performed by: EMERGENCY MEDICINE

## 2018-04-12 PROCEDURE — 74177 CT ABD & PELVIS W/CONTRAST: CPT

## 2018-04-12 PROCEDURE — 99285 EMERGENCY DEPT VISIT HI MDM: CPT | Mod: Z6 | Performed by: EMERGENCY MEDICINE

## 2018-04-12 PROCEDURE — 99285 EMERGENCY DEPT VISIT HI MDM: CPT | Mod: 25 | Performed by: EMERGENCY MEDICINE

## 2018-04-12 PROCEDURE — 85025 COMPLETE CBC W/AUTO DIFF WBC: CPT | Performed by: EMERGENCY MEDICINE

## 2018-04-12 PROCEDURE — 25000125 ZZHC RX 250: Performed by: EMERGENCY MEDICINE

## 2018-04-12 PROCEDURE — 80053 COMPREHEN METABOLIC PANEL: CPT | Performed by: EMERGENCY MEDICINE

## 2018-04-12 PROCEDURE — 81001 URINALYSIS AUTO W/SCOPE: CPT | Performed by: EMERGENCY MEDICINE

## 2018-04-12 PROCEDURE — 25000128 H RX IP 250 OP 636: Performed by: EMERGENCY MEDICINE

## 2018-04-12 RX ORDER — WARFARIN SODIUM 2.5 MG/1
TABLET ORAL
Qty: 100 TABLET | Refills: 1 | Status: SHIPPED | OUTPATIENT
Start: 2018-04-12 | End: 2018-05-10

## 2018-04-12 RX ORDER — CEFTRIAXONE SODIUM 1 G/50ML
1 INJECTION, SOLUTION INTRAVENOUS ONCE
Status: COMPLETED | OUTPATIENT
Start: 2018-04-12 | End: 2018-04-12

## 2018-04-12 RX ORDER — CIPROFLOXACIN 2 MG/ML
400 INJECTION, SOLUTION INTRAVENOUS ONCE
Status: DISCONTINUED | OUTPATIENT
Start: 2018-04-12 | End: 2018-04-12

## 2018-04-12 RX ADMIN — IOHEXOL 100 ML: 350 INJECTION, SOLUTION INTRAVENOUS at 07:38

## 2018-04-12 RX ADMIN — CEFTRIAXONE SODIUM 1 G: 1 INJECTION, SOLUTION INTRAVENOUS at 09:00

## 2018-04-12 ASSESSMENT — ENCOUNTER SYMPTOMS
FEVER: 0
NAUSEA: 0
ABDOMINAL PAIN: 1
SHORTNESS OF BREATH: 0
ABDOMINAL DISTENTION: 0
CHILLS: 0
COUGH: 0
DIARRHEA: 0
VOMITING: 0

## 2018-04-12 ASSESSMENT — PAIN DESCRIPTION - DESCRIPTORS: DESCRIPTORS: ACHING

## 2018-04-12 NOTE — ED AVS SNAPSHOT
[facility]    [email or web address] [facility] [address]    Phone:  [phone]    Fax:  [phone]                                       Milagros Peters   MRN: [id number]    Department:  [facility]   Date of Visit:  4/12/2018           Patient Information     Date Of Birth          12/23/1931        Your diagnoses for this visit were:     Diverticulitis of colon        You were seen by Jeff Livingston MD.        Discharge Instructions       1.  Take Augmentin 1 tablet twice daily for 10 days  2.  Make an appointment with your primary care physician to set up an endoscopy as soon as possible  3.  If having worsening symptoms return to ER    Your next 10 appointments already scheduled     Apr 19, 2018  9:50 AM CDT   New Visit with Moise Abbott MD   [facility] ([facility])    [email or web address] [facility] [address] [phone]            Apr 24, 2018  4:15 PM CDT   Anticoagulation Visit with  ANTI COAG 1   [facility] ([facility])    [address] [facility] [address] [phone]              24 Hour Appointment Hotline       To make an appointment at any [facility], call [phone] ([phone]). If you don't have a family doctor or clinic, we will help you find one. [facility] clinics are conveniently located to serve the needs of you and your family.             Review of your medicines      START taking        Dose / Directions Last dose taken    amoxicillin-clavulanate 875-125 MG per tablet   Commonly known as:  AUGMENTIN   Dose:  1 tablet   Quantity:  20 tablet        Take 1 tablet by mouth 2 times daily for 10 days   Refills:  0          Our records show that you are taking the medicines listed below. If these are incorrect, please call your family doctor or clinic.        Dose / Directions Last dose taken    aspirin EC 81 MG EC  tablet   Dose:  81 mg        Take 81 mg by mouth daily with food   Refills:  0        ferrous gluconate 324 (38 Fe) MG tablet   Commonly known as:  FERGON   Dose:  324 mg   Quantity:  30 tablet        Take 1 tablet (324 mg) by mouth daily (with breakfast)   Refills:  1        TENORMIN 50 MG tablet   Dose:  50 mg   Generic drug:  atenolol        Take 50 mg by mouth 2 times daily   Refills:  0        * warfarin 2.5 MG tablet   Commonly known as:  COUMADIN        Take 5 mg x 2 days/week and 2.5 mg x 5 days/week.  Or as directed by the [facility].   Refills:  0        * warfarin 5 MG tablet   Commonly known as:  COUMADIN        Take 5 mg x 2 days/week and 2.5 mg x 5 days/week.  Or as directed by the [facility].   Refills:  0        * Notice:  This list has 2 medication(s) that are the same as other medications prescribed for you. Read the directions carefully, and ask your doctor or other care provider to review them with you.            Prescriptions were sent or printed at these locations (1 Prescription)                   Other Prescriptions                Printed at Department/Unit printer (1 of 1)         amoxicillin-clavulanate (AUGMENTIN) 875-125 MG per tablet                Procedures and tests performed during your visit     *UA reflex to Microscopic    CBC with platelets differential    CT Abdomen Pelvis w Contrast    Comprehensive metabolic panel    Urine Microscopic      Orders Needing Specimen Collection     None      Pending Results     No orders found from 4/10/2018 to 4/13/2018.            Pending Culture Results     No orders found from 4/10/2018 to 4/13/2018.            Pending Results Instructions     If you had any lab results that were not finalized at the time of your Discharge, you can call the ED Lab Result RN at [phone]. You will be contacted by this team for any positive Lab results or changes in treatment. The nurses are available 7 days a week from 10A to 6:30P.  You can leave a  "message 24 hours per day and they will return your call.        Thank you for choosing [facility]       Thank you for choosing [facility] for your care. Our goal is always to provide you with excellent care. Hearing back from our patients is one way we can continue to improve our services. Please take a few minutes to complete the written survey that you may receive in the mail after you visit with us. Thank you!        [facility] Information     [facility]pler lets you send messages to your doctor, view your test results, renew your prescriptions, schedule appointments and more. To sign up, go to [email or web address] . Click on \"Log in\" on the left side of the screen, which will take you to the Welcome page. Then click on \"Sign up Now\" on the right side of the page.     You will be asked to enter the access code listed below, as well as some personal information. Please follow the directions to create your username and password.     Your access code is: [email or web address]  Expires: 7/3/2018  9:26 AM     Your access code will  in 90 days. If you need help or a new code, please call your [facility] clinic or [phone].        Care EveryWhere ID     This is your Care EveryWhere ID. This could be used by other organizations to access your [facility] medical records  [id number]        Equal Access to Services     EDILBERTO DONOHUE AH: Nile [place], waaxda luqadaha, qaybta kaalmada adeweston, charis field. So [facility] [phone].    ATENCIÓN: Si habla español, tiene a singh disposición servicios gratuitos de asistencia lingüística. Llame al [phone].    We comply with applicable federal civil rights laws and [place] laws. We do not discriminate on the basis of race, color, national origin, age, disability, sex, sexual orientation, or gender identity.            After Visit Summary       This is your record. Keep this with you and show to your community pharmacist(s) and doctor(s) at your " next visit.

## 2018-04-12 NOTE — DISCHARGE INSTRUCTIONS
1.  Take Augmentin 1 tablet twice daily for 10 days  2.  Make an appointment with your primary care physician to set up an endoscopy as soon as possible  3.  If having worsening symptoms return to ER

## 2018-04-12 NOTE — ED NOTES
Patient reports bilateral lower abd pain that began overnight. She denies dysuria. Patient was recently seen for a fall and sustained injuries to which she continues to report pain.

## 2018-04-12 NOTE — ED PROVIDER NOTES
"  History     Chief Complaint   Patient presents with     Abdominal Pain     began last night     HPI Comments: This is a 86-year-old female presenting with acute persistent left lower quadrant abdominal pain which started sometime last night.  Patient is demented and is not able to give detailed history.  However her daughter with whom the patient lives with states patient went to bed last night in her usual health without complaints.  She said when she woke up earlier this morning patient was sitting at the edge of the bed angry and telling her daughter that she has been calling her all night because she has not been feeling well because of the pain.  No diarrhea or vomiting or fever reported.  No known history of diverticulosis.  Patient has not had a colonoscopy for a long time.  She was found recently to have presumed occult GI bleed with hemoglobin of 8.8.  She follow up with her primary care physician but she declined to have endoscopy or colonoscopy done.  She understands the GI bleeding could be resulting from intra-abdominal cancer.  She states \"I do not care because it is my life and I should be able to make medical decisions.\"      Problem List:    Patient Active Problem List    Diagnosis Date Noted     Iron deficiency anemia due to chronic blood loss 04/06/2018     Priority: Medium     Memory loss 04/06/2018     Priority: Medium     Profound impairment, one eye, impairment level not further specified 02/20/2018     Priority: Medium     Overview:   after valve surgery, attributed to blood clot       H/O heart valve replacement with porcine valve 02/11/2018     Priority: Medium     Long term current use of anticoagulant therapy 02/11/2018     Priority: Medium     Anticoagulation monitoring, INR range 2-3 06/14/2017     Priority: Medium     DNR no code (do not resuscitate) 06/14/2017     Priority: Medium     S/P AVR 06/14/2017     Priority: Medium     Heart valve replaced 09/01/2010     Priority: Medium "     Hyperlipidemia 09/01/2010     Priority: Medium     Hypertension 09/01/2010     Priority: Medium        Past Medical History:    Past Medical History:   Diagnosis Date     Essential (primary) hypertension      Hyperlipidemia      Nonrheumatic aortic valve disorder      Osteoarthritis      Pneumonia        Past Surgical History:    Past Surgical History:   Procedure Laterality Date     APPENDECTOMY OPEN      childhood     HYSTERECTOMY TOTAL ABDOMINAL      1994,kept ovaries     OTHER SURGICAL HISTORY      2003,,AORTIC VALVE REPLACEMENT     TONSILLECTOMY      childhood       Family History:    Family History   Problem Relation Age of Onset     CANCER Mother      Cancer     Asthma Mother      Asthma     HEART DISEASE Father      Heart Disease,heart problems       Social History:  Marital Status:   [5]  Social History   Substance Use Topics     Smoking status: Never Smoker     Smokeless tobacco: Never Used     Alcohol use No        Medications:      amoxicillin-clavulanate (AUGMENTIN) 875-125 MG per tablet   ferrous gluconate (FERGON) 324 (38 FE) MG tablet   aspirin EC 81 MG EC tablet   atenolol (TENORMIN) 50 MG tablet   warfarin (COUMADIN) 2.5 MG tablet   [DISCONTINUED] warfarin (COUMADIN) 2.5 MG tablet   [DISCONTINUED] warfarin (COUMADIN) 5 MG tablet   warfarin (COUMADIN) 5 MG tablet         Review of Systems   Constitutional: Negative for chills and fever.   Respiratory: Negative for cough and shortness of breath.    Cardiovascular: Negative for chest pain.   Gastrointestinal: Positive for abdominal pain. Negative for abdominal distention, diarrhea, nausea and vomiting.   All other systems reviewed and are negative.      Physical Exam   BP: 149/88  Heart Rate: 95  Temp: 97.9  F (36.6  C)  Resp: 18  SpO2: 97 %      Physical Exam   Constitutional: She is oriented to person, place, and time. She appears well-developed and well-nourished. No distress.   Cardiovascular: Normal rate, regular rhythm, normal  heart sounds and intact distal pulses.    Pulmonary/Chest: Effort normal and breath sounds normal. No respiratory distress. She has no wheezes. She has no rales. She exhibits no tenderness.   Abdominal: Soft. Bowel sounds are normal. She exhibits no distension and no mass. There is no rebound and no guarding.   Moderate left lower quadrant abdominal tenderness.  There is minimal tenderness in the right upper and left upper abdominal quadrants as well without peritoneal signs   Musculoskeletal: Normal range of motion. She exhibits no edema or tenderness.   Neurological: She is oriented to person, place, and time.       ED Course     History is limited because of patient's dementia.  She appears quite angry even though she states she is not angry.  Patient is presenting with reportedly acute left lower quadrant abdominal pain and has a significant tenderness in that area without peritoneal signs.  She does not remember when she last had a bowel movement but she denies diarrhea or vomiting.  No known history of diverticulitis.  The differentials are large.  However with patient not having had a colonoscopy and recent documented significant drop in her hemoglobin the concern is a GI bleed which raises possibility of intra-abdominal or intrapelvic malignancy.  However other causes such as diverticulitis, inflammatory bowel disease, stool pain, intra-abdominal infectious process would have to be considered as well.  Will obtain basic labs along with CT abdomen/pelvis.  Patient is not in distress at this time.  CBC reveals hemoglobin of 8.7 and hematocrit of 29 with platelets of 360.  It appears that anemia has not worsened.  There is elevated white count of 14.5 with segs of 86%.  CT reveals acute diverticulitis without perforation or abscess formation.  Patient is on Coumadin with her recent INR over 4.  Usual antibiotics, metronidazole and ciprofloxacin would affect the patient's INR and with her being hemoglobin  presumed to be altering from a GI bleed and was CT showing what appears to be a mass lesion at the junction of the esophagus and stomach starting her with Cipro and Flagyl probably not prudent.  Therefore patient received a gram of Rocephin IV in the emergency room and sent home with Augmentin 875 mg 1 tablet twice daily for 10 days. CT also reveals ill-defined mass at the junction of the esophagus and stomach.  Radiology recommends endoscopy.  Patient advised that to consult with Dr. Abbott, general surgeon, at his clinic Thursday next week.  I had personally spoken with Dr. Abbott this morning about the patient's case and he recommended outpatient evaluation with subsequent endoscopy.    ED Course     Procedures               Critical Care time:  none               Results for orders placed or performed during the hospital encounter of 04/12/18 (from the past 24 hour(s))   CBC with platelets differential   Result Value Ref Range    WBC 14.5 (H) 4.0 - 11.0 10e9/L    RBC Count 4.26 3.8 - 5.2 10e12/L    Hemoglobin 8.7 (L) 11.7 - 15.7 g/dL    Hematocrit 29.0 (L) 35.0 - 47.0 %    MCV 68 (L) 78 - 100 fl    MCH 20.4 (L) 26.5 - 33.0 pg    MCHC 30.0 (L) 31.5 - 36.5 g/dL    RDW 17.9 (H) 10.0 - 15.0 %    Platelet Count 360 150 - 450 10e9/L    Diff Method Automated Method     % Neutrophils 85.9 %    % Lymphocytes 4.7 %    % Monocytes 8.8 %    % Eosinophils 0.1 %    % Basophils 0.1 %    % Immature Granulocytes 0.4 %    Absolute Neutrophil 12.4 (H) 1.6 - 8.3 10e9/L    Absolute Lymphocytes 0.7 (L) 0.8 - 5.3 10e9/L    Absolute Monocytes 1.3 0.0 - 1.3 10e9/L    Absolute Eosinophils 0.0 0.0 - 0.7 10e9/L    Absolute Basophils 0.0 0.0 - 0.2 10e9/L    Abs Immature Granulocytes 0.1 0 - 0.4 10e9/L   Comprehensive metabolic panel   Result Value Ref Range    Sodium 138 134 - 144 mmol/L    Potassium 4.0 3.5 - 5.1 mmol/L    Chloride 101 98 - 107 mmol/L    Carbon Dioxide 30 21 - 31 mmol/L    Anion Gap 7 3 - 14 mmol/L    Glucose 112 (H) 70 -  105 mg/dL    Urea Nitrogen 16 7 - 25 mg/dL    Creatinine 0.71 0.60 - 1.20 mg/dL    GFR Estimate 78 >60 mL/min/1.7m2    GFR Estimate If Black >90 >60 mL/min/1.7m2    Calcium 9.2 8.6 - 10.3 mg/dL    Bilirubin Total 0.5 0.3 - 1.0 mg/dL    Albumin 3.1 (L) 3.5 - 5.7 g/dL    Protein Total 6.4 6.4 - 8.9 g/dL    Alkaline Phosphatase 86 34 - 104 U/L    ALT 9 7 - 52 U/L    AST 15 13 - 39 U/L   CT Abdomen Pelvis w Contrast    Narrative    PROCEDURE:  CT ABDOMEN PELVIS W CONTRAST    HISTORY:  LLQ abd pain;      TECHNIQUE:  Helical CT of the abdomen and pelvis was performed  following injection of intravenous contrast.  Sagittal and coronal  reformatted images were reviewed.    COMPARISON:  None.    FINDINGS:      The lung bases are clear. There is a lobulated soft tissue mass at the  gastroesophageal junction, measuring approximately 5.6 x 5.9 cm. This  mass circumferentially surrounds the esophagus, although there is a  preserved fat plane between the esophagus and the mass posteriorly.  The mass abuts the posterior heart border, extends posteriorly to the  aorta and abuts the superior margin of the liver. There is additional  left infrahilar lymphadenopathy, partially visualized. There is  scarring or atelectasis in the lung bases.    There are multiple low-attenuation lesions in the spleen, the largest  measuring 6.2 x 4.1 cm. The liver, pancreas and adrenal glands are  unremarkable. There are gallstones in the gallbladder.    There are small bilateral renal cysts. The kidneys are otherwise  intact.     The bowel is normal in caliber. There has been prior appendectomy.  There are multiple diverticula in the colon. There is fat stranding  associated with several diverticula in the sigmoid colon, suggesting  diverticulitis. There is no abscess or fluid collection. There is mild  free fluid in the pelvis.     The aorta is normal in size with scattered atherosclerotic  calcifications.    No free air or lymphadenopathy.    No  suspicious osseous lesions are identified. There are multilevel  degenerative changes in the spine.      Impression    IMPRESSION:    1. Diverticulitis of the sigmoid colon. No abscess or perforation.  2. Lobulated soft tissue mass at the gastroesophageal junction,  unclear origin. Consider endoscopy for further assessment.  3. Multiple low-attenuation lesions in the spleen. Typically, these  are benign lesions such as hemangiomas, although in light of the  nearby soft tissue mass, malignancy is not excluded.    MARTIN MENENDEZ MD   *UA reflex to Microscopic   Result Value Ref Range    Color Urine Yellow     Appearance Urine Clear     Glucose Urine Negative NEG^Negative mg/dL    Bilirubin Urine Negative NEG^Negative    Ketones Urine Negative NEG^Negative mg/dL    Specific Gravity Urine <1.005 1.003 - 1.035    Blood Urine Moderate (A) NEG^Negative    pH Urine 5.5 5.0 - 7.0 pH    Protein Albumin Urine Negative NEG^Negative mg/dL    Urobilinogen Urine 0.2 0.2 - 1.0 EU/dL    Nitrite Urine Negative NEG^Negative    Leukocyte Esterase Urine Negative NEG^Negative    Source Midstream Urine    Urine Microscopic   Result Value Ref Range    WBC Urine 0 - 5 OTO5^0 - 5 /HPF    RBC Urine 2-5 (A) OTO2^O - 2 /HPF    Squamous Epithelial /LPF Urine Few FEW^Few /LPF       Medications   cefTRIAXone in d5w (ROCEPHIN) intermittent infusion 1 g (not administered)   iohexol (OMNIPAQUE) 350 mg/mL solution 100 mL (100 mLs Intravenous Given 4/12/18 2623)       Assessments & Plan (with Medical Decision Making)     I have reviewed the nursing notes.    I have reviewed the findings, diagnosis, plan and need for follow up with the patient.       New Prescriptions    AMOXICILLIN-CLAVULANATE (AUGMENTIN) 875-125 MG PER TABLET    Take 1 tablet by mouth 2 times daily for 10 days       Final diagnoses:   Diverticulitis of colon       4/12/2018   [facility] MD  04/12/18 7577

## 2018-04-12 NOTE — ED AVS SNAPSHOT
[facility]    [address]    [place] [address]    Phone:  [phone]    Fax:  [phone]                                       Milagros Peters   MRN: [id number]    Department:  [facility]   Date of Visit:  4/12/2018           After Visit Summary Signature Page     I have received my discharge instructions, and my questions have been answered. I have discussed any challenges I see with this plan with the nurse or doctor.    ..........................................................................................................................................  Patient/Patient Representative Signature      ..........................................................................................................................................  Patient Representative Print Name and Relationship to Patient    ..................................................               ................................................  Date                                            Time    ..........................................................................................................................................  Reviewed by Signature/Title    ...................................................              ..............................................  Date                                                            Time

## 2018-04-19 ENCOUNTER — TELEPHONE (OUTPATIENT)
Dept: SURGERY | Facility: OTHER | Age: 83
End: 2018-04-19

## 2018-04-19 ENCOUNTER — OFFICE VISIT (OUTPATIENT)
Dept: SURGERY | Facility: OTHER | Age: 83
End: 2018-04-19
Attending: SURGERY
Payer: MEDICARE

## 2018-04-19 VITALS
WEIGHT: 127.2 LBS | DIASTOLIC BLOOD PRESSURE: 62 MMHG | BODY MASS INDEX: 24.43 KG/M2 | HEART RATE: 62 BPM | SYSTOLIC BLOOD PRESSURE: 128 MMHG

## 2018-04-19 DIAGNOSIS — K57.32 DIVERTICULITIS OF LARGE INTESTINE WITHOUT PERFORATION OR ABSCESS WITHOUT BLEEDING: Primary | ICD-10-CM

## 2018-04-19 DIAGNOSIS — R13.19 OTHER DYSPHAGIA: ICD-10-CM

## 2018-04-19 DIAGNOSIS — J98.59 MEDIASTINAL MASS: ICD-10-CM

## 2018-04-19 PROCEDURE — 99203 OFFICE O/P NEW LOW 30 MIN: CPT | Performed by: SURGERY

## 2018-04-19 PROCEDURE — G0463 HOSPITAL OUTPT CLINIC VISIT: HCPCS

## 2018-04-19 RX ORDER — AMOXICILLIN AND CLAVULANATE POTASSIUM 400; 57 MG/5ML; MG/5ML
875 POWDER, FOR SUSPENSION ORAL 2 TIMES DAILY
Qty: 150 ML | Refills: 0 | Status: SHIPPED | OUTPATIENT
Start: 2018-04-19 | End: 2018-04-26

## 2018-04-19 ASSESSMENT — PAIN SCALES - GENERAL: PAINLEVEL: SEVERE PAIN (6)

## 2018-04-19 NOTE — TELEPHONE ENCOUNTER
Screening Questions for the Scheduling of Screening Colonoscopies   (If Colonoscopy is diagnostic, Provider should review the chart before scheduling.)  Are you younger than 50 or older than 80?   NO   Do you take aspirin or fish oil?   YES  -  ASPIRIN  (if yes, tell patient to stop 1 week prior to Colonoscopy)  Do you take warfarin (Coumadin), clopidogrel (Plavix), apixaban (Eliquis), dabigatram (Pradaxa), rivaroxaban (Xarelto) or any blood thinner?  YES -WARFARIN    **  DR. TRUONG SPOKE WITH PATIENT ON HOLDING HER WARFARIN **  Do you use oxygen at home?  NO   Do you have kidney disease?   NO   Are you on dialysis?   NO   Have you had a stroke or heart attack in the last year?   NO   Have you had a stent in your heart or any blood vessel in the last year?   NO   Have you had a transplant of any organ?   NO   Have you had a colonoscopy or upper endoscopy (EGD) before? YES          When?   OVER 10 YEARS     Date of scheduled EGD  -   04/27/2018  Provider  [facility]   Pharmacy

## 2018-04-19 NOTE — PROGRESS NOTES
GENERAL SURGERY CONSULTATION NOTE    Milagros Peters   [address] [place] [address]  86 year old  female  Admission Date/Time: No admission date for patient encounter.  Primary Care Provider:  Raisa Madrid was asked to see this patient by Dr. Livingston for evaluation of esophageal mass.     HPI: Milagros Peters is a 86 year old female who presented to the ED with what was determined to be diverticulitis and was found to have an ill defined esophageal mass and abnormal spleen.  She is feeling okay from the diverticulitis, but cannot swallow the Augmentin tabs. She also could not eat a pork chop last night.  She normally eats softer foods and does not notice dysphagia with that.  She had a fall a few weeks back and has right rib pain.  This is improving.  She is with her daughter who she lives with.     REVIEW OF SYSTEMS:    GENERAL: No fevers or chills. Denies fatigue, recent weight loss.  HEENT: No sinus drainage. No changes with vision or hearing. No difficulty swallowing.   LYMPHATICS:  No swollen nodes in axilla, neck or groin.  CARDIOVASCULAR: Denies chest pain, palpitations and dyspnea on exertion.  PULMONARY: No shortness of breath or cough. No increase in sputum production.  GI: Denies melena,bright red blood in stools. No hematemesis. No constipation or diarrhea.  : No dysuria or hematuria.  SKIN: No recent rashes or ulcers.   HEMATOLOGY:  On warfarin   ENDOCRINE:  No history of diabetes or thyroid problems.  NEUROLOGY:  No history of seizures or headaches. ++ Dementia         Patient Active Problem List   Diagnosis     H/O heart valve replacement with porcine valve     Long term current use of anticoagulant therapy     Anticoagulation monitoring, INR range 2-3     Heart valve replaced     Profound impairment, one eye, impairment level not further specified     DNR no code (do not resuscitate)     Hyperlipidemia     Hypertension     S/P AVR     Iron deficiency anemia due to chronic blood loss      Memory loss       Past Medical History:   Diagnosis Date     Essential (primary) hypertension     No Comments Provided     Hyperlipidemia     No Comments Provided     Nonrheumatic aortic valve disorder     No Comments Provided     Osteoarthritis     No Comments Provided     Pneumonia     5/17/2012       Past Surgical History:   Procedure Laterality Date     APPENDECTOMY OPEN      childhood     HYSTERECTOMY TOTAL ABDOMINAL      1994,kept ovaries     OTHER SURGICAL HISTORY      2003,,AORTIC VALVE REPLACEMENT     TONSILLECTOMY      childhood       Family History   Problem Relation Age of Onset     CANCER Mother      Cancer     Asthma Mother      Asthma     HEART DISEASE Father      Heart Disease,heart problems       Social History     Social History Narrative    [place]  Is preferred name.     Okay to talk to Sarah Suresh  [phone] in a medical emergency;  Daughter     Moved to [place].  Lives with daughter in [facility].   [place] has [employer] dogs.   Best Jenn    a;   [employer] [address] [place] in [facility] ;  Worked at grocery, telephone company store.      [place] [employer] man, [place]  Approximately a month       1952;  Ran numbers          2 sons in [place]  daughter in the area           Social History     Social History     Marital status:      Spouse name: N/A     Number of children: N/A     Years of education: N/A     Occupational History     Not on file.     Social History Main Topics     Smoking status: Never Smoker     Smokeless tobacco: Never Used     Alcohol use No     Drug use: No      Comment: Drug use: No     Sexual activity: Not on file     Other Topics Concern     Not on file     Social History Narrative    [place]  Is preferred name.     Okay to talk to Sarah Suresh  [phone] in a medical emergency;  Daughter     Moved to [place].  Lives with daughter in [facility].   [place] has  best hot dogs.   [employer]    a;   [employer] [address] in [place] ;  Worked at [employer], telephone company store.      [place] [employer] man, [place]  Approximately a month       1952;  Ran numbers          2 sons in [place]  daughter in the area             Current Outpatient Prescriptions on File Prior to Visit:  aspirin EC 81 MG EC tablet Take 81 mg by mouth daily with food   atenolol (TENORMIN) 50 MG tablet Take 50 mg by mouth 2 times daily   ferrous gluconate (FERGON) 324 (38 FE) MG tablet Take 1 tablet (324 mg) by mouth daily (with breakfast)   warfarin (COUMADIN) 2.5 MG tablet Take 5 mg x 1 day and 2.5 mg x 6 days/week or as directed by [facility]   warfarin (COUMADIN) 5 MG tablet Take 5 mg x 2 days/week and 2.5 mg x 5 days/week.  Or as directed by the [facility].     No current facility-administered medications on file prior to visit.       ALLERGIES/SENSITIVITIES:   Allergies   Allergen Reactions     Neosporin [Neomycin-Polymyx-Gramicid]        PHYSICAL EXAM:     /62 (BP Location: Right arm, Patient Position: Chair, Cuff Size: Adult Regular)  Pulse 62  Wt 127 lb 3.2 oz (57.7 kg)  BMI 24.43 kg/m2    General Appearance:   Appears stated age.  Has short term memory loss.   Heart & CV:  RRR no murmur.  Intact distal pulses, good cap refill.  LUNGS:  CTA B/L, no wheezing or crackles.  Abd:  Flat, soft, no definite mass.   Ext: Thin and frail.       ADDITIONAL COMMENTS:      CONSULTATION ASSESSMENT AND PLAN:    86 year old female with ill defined esophageal mass on CT. After a long discussion of possible pathology, risks of the proceedure, we decided to proceed with EGD for diagnosis.  She does not want to pursue treatment if it is esophogeal cancer.  She would consider a stent.  She does not want a feeding tube. She is recovering from her diverticulitis.     - Switch to liquid Augmentin   - Hold warfarin X 3 days prior      Moise Abbott MD on 4/19/2018 at 12:00 PM         Moise  KILEY Abbott MD on 4/19/2018 at 11:49 AM

## 2018-04-19 NOTE — MR AVS SNAPSHOT
"              After Visit Summary   4/19/2018    Milagros Peters    MRN: [id number]           Patient Information     Date Of Birth          12/23/1931        Visit Information        Provider Department      4/19/2018 9:50 AM Moise Abbott MD [facility]        Today's Diagnoses     Diverticulitis of large intestine without perforation or abscess without bleeding    -  1    Other dysphagia        Mediastinal mass           Follow-ups after your visit        Your next 10 appointments already scheduled     Apr 24, 2018  4:15 PM CDT   Anticoagulation Visit with GH ANTI COAG 1   [facility]    [address]  [place] [address] [phone]              Who to contact     If you have questions or need follow up information about today's clinic visit or your schedule please contact [facility] directly at [phone].  Normal or non-critical lab and imaging results will be communicated to you by [employer], letter or phone within 4 business days after the clinic has received the results. If you do not hear from us within 7 days, please contact the clinic through [employer] or phone. If you have a critical or abnormal lab result, we will notify you by phone as soon as possible.  Submit refill requests through [employer] or call your pharmacy and they will forward the refill request to us. Please allow 3 business days for your refill to be completed.          Additional Information About Your Visit        MyChart Information     XTRM lets you send messages to your doctor, view your test results, renew your prescriptions, schedule appointments and more. To sign up, go to [email or web address] . Click on \"Log in\" on the left side of the screen, which will take you to the Welcome page. Then click on \"Sign up Now\" on the right side of the page.     You will be asked to enter the access code listed below, as well as " some personal information. Please follow the directions to create your username and password.     Your access code is: [email or web address]  Expires: 7/3/2018  9:26 AM     Your access code will  in 90 days. If you need help or a new code, please call your [place] clinic or [phone].        Care EveryWhere ID     This is your Care EveryWhere ID. This could be used by other organizations to access your [place] medical records  [id number]        Your Vitals Were     Pulse BMI (Body Mass Index)                62 24.43 kg/m2           Blood Pressure from Last 3 Encounters:   18 128/62   18 153/58   18 144/52    Weight from Last 3 Encounters:   18 127 lb 3.2 oz (57.7 kg)   18 129 lb 3.2 oz (58.6 kg)   17 141 lb 12.8 oz (64.3 kg)              Today, you had the following     No orders found for display         Today's Medication Changes          These changes are accurate as of 18 12:07 PM.  If you have any questions, ask your nurse or doctor.               Start taking these medicines.        Dose/Directions    amoxicillin-clavulanate 400-57 MG/5ML suspension   Commonly known as:  AUGMENTIN   Used for:  Diverticulitis of large intestine without perforation or abscess without bleeding, Other dysphagia   Replaces:  amoxicillin-clavulanate 875-125 MG per tablet   Started by:  Moise Abbott MD        Dose:  875 mg   Take 10.9 mLs (875 mg) by mouth 2 times daily for 7 days   Quantity:  150 mL   Refills:  0         Stop taking these medicines if you haven't already. Please contact your care team if you have questions.     amoxicillin-clavulanate 875-125 MG per tablet   Commonly known as:  AUGMENTIN   Replaced by:  amoxicillin-clavulanate 400-57 MG/5ML suspension   Stopped by:  Moise Abbott MD                Where to get your medicines      These medications were sent to [facility] - [place], [place] - [address],  [facility] [address]     Phone:  [phone]     amoxicillin-clavulanate 400-57 MG/5ML suspension                Primary Care Provider Office Phone # Fax #    Raisa Madrid [phone]       [address]  [facility] [address]        Equal Access to Services     CHANDA DONOHUE : Hadii aad ku hadasho Soomaali, waaxda luqadaha, qaybta kaalmada adeegyada, waxay dilciain hayaan adebryce khcalebcassidy laadi . So [facility] [phone].    ATENCIÓN: Si habla español, tiene a singh disposición servicios gratuitos de asistencia lingüística. Llame al [phone].    We comply with applicable federal civil rights laws and [place] laws. We do not discriminate on the basis of race, color, national origin, age, disability, sex, sexual orientation, or gender identity.            Thank you!     Thank you for choosing [facility]  for your care. Our goal is always to provide you with excellent care. Hearing back from our patients is one way we can continue to improve our services. Please take a few minutes to complete the written survey that you may receive in the mail after your visit with us. Thank you!             Your Updated Medication List - Protect others around you: Learn how to safely use, store and throw away your medicines at www.disposemymeds.org.          This list is accurate as of 4/19/18 12:07 PM.  Always use your most recent med list.                   Brand Name Dispense Instructions for use Diagnosis    amoxicillin-clavulanate 400-57 MG/5ML suspension    AUGMENTIN    150 mL    Take 10.9 mLs (875 mg) by mouth 2 times daily for 7 days    Diverticulitis of large intestine without perforation or abscess without bleeding, Other dysphagia       aspirin EC 81 MG EC tablet      Take 81 mg by mouth daily with food        ferrous gluconate 324 (38 Fe) MG tablet    FERGON    30 tablet    Take 1 tablet (324 mg) by mouth daily (with breakfast)        TENORMIN 50 MG tablet   Generic  drug:  atenolol      Take 50 mg by mouth 2 times daily        * warfarin 5 MG tablet    COUMADIN     Take 5 mg x 2 days/week and 2.5 mg x 5 days/week.  Or as [facility].        * warfarin 2.5 MG tablet    COUMADIN    100 tablet    Take 5 mg x 1 day and 2.5 mg x 6 days/week or as directed [facility]    H/O heart valve replacement with porcine valve       * Notice:  This list has 2 medication(s) that are the same as other medications prescribed for you. Read the directions carefully, and ask your doctor or other care provider to review them with you.

## 2018-04-24 ENCOUNTER — ANTICOAGULATION THERAPY VISIT (OUTPATIENT)
Dept: ANTICOAGULATION | Facility: OTHER | Age: 83
End: 2018-04-24
Attending: FAMILY MEDICINE
Payer: MEDICARE

## 2018-04-24 DIAGNOSIS — Z95.3 H/O HEART VALVE REPLACEMENT WITH PORCINE VALVE: ICD-10-CM

## 2018-04-24 DIAGNOSIS — Z79.01 LONG TERM CURRENT USE OF ANTICOAGULANT THERAPY: ICD-10-CM

## 2018-04-24 LAB — INR POINT OF CARE: 7 (ref 2.5–3.5)

## 2018-04-24 PROCEDURE — 85610 PROTHROMBIN TIME: CPT | Mod: QW,ZL

## 2018-04-24 NOTE — PROGRESS NOTES
ANTICOAGULATION FOLLOW-UP CLINIC VISIT    Patient Name:  Milagros Peters  Date:  4/24/2018  Contact Type:  Face to Face    SUBJECTIVE:     Patient Findings     Positives Other complaints (patient finished with Augmentin on 4/22/18 for diverticultis. she is having esophascopy on  4/27/18)           OBJECTIVE    INR Protime   Date Value Ref Range Status   04/24/2018 7.0 (A) 2.5 - 3.5 Final       ASSESSMENT / PLAN  INR assessment SUPRA    Recheck INR In: 2 DAYS    INR Location Clinic      Anticoagulation Summary as of 4/24/2018     INR goal 2.5-3.5   Today's INR 7.0!   Maintenance plan 5 mg (5 mg x 1) on Mon; 2.5 mg (2.5 mg x 1) all other days   Full instructions 4/24: Hold; 4/25: Hold; 4/26: Hold; Otherwise 5 mg on Mon; 2.5 mg all other days   Weekly total 20 mg   Plan last modified Arabella Longo RN (4/11/2018)   Next INR check 4/26/2018   Priority INR   Target end date Indefinite    Indications   H/O heart valve replacement with porcine valve [Z95.3]  Long term current use of anticoagulant therapy [Z79.01]         Anticoagulation Episode Summary     INR check location     Preferred lab     Send INR reminders to  INR    Comments       Anticoagulation Care Providers     Provider Role Specialty Phone number    Raisa Madrid MD [facility] [phone]            See the Encounter Report to view Anticoagulation Flowsheet and Dosing Calendar (Go to Encounters tab in chart review, and find the Anticoagulation Therapy Visit)        Arabella Longo RN

## 2018-04-24 NOTE — MR AVS SNAPSHOT
Milagros Peters   4/24/2018 4:15 PM   Anticoagulation Therapy Visit    Description:  86 year old female   Provider:  PATRICK ANTI COAKIMBERLY 1   Department:  [facility]           INR as of 4/24/2018     Today's INR 7.0!      Anticoagulation Summary as of 4/24/2018     INR goal 2.5-3.5   Today's INR 7.0!   Full instructions 4/24: Hold; 4/25: Hold; 4/26: Hold; Otherwise 5 mg on Mon; 2.5 mg all other days   Next INR check 4/26/2018    Indications   H/O heart valve replacement with porcine valve [Z95.3]  Long term current use of anticoagulant therapy [Z79.01]         Description     Hold x 3 days for procedure on 4/27/18. Recheck INR on 4/26/18. Arabella Longo RN    4/24/2018  4:09 PM        Your next Anticoagulation Clinic appointment(s)     Apr 24, 2018  4:15 PM CDT   Anticoagulation Visit with GH ANTI COAG 1   [facility] ([facility]    [address]  [place] [address]   [phone]              April 2018 Details    Sun Mon Tue Wed Thu Fri Sat     1               2               3               4               5               6               7                 8               9               10               11               12               13               14                 15               16               17               18               19               20               21                 22               23               24      Hold   See details      25      Hold         26            27               28                 29               30                     Date Details   04/24 This INR check       Date of next INR:  4/26/2018         How to take your warfarin dose     Hold Do not take your warfarin dose. See the Details table to the right for additional instructions.

## 2018-04-24 NOTE — Clinical Note
FYI- finished with Augmentin on 4/22/18 for diverticulitis. Will be having esophagoscopy/biopsy on 4/27/18 for mass. Will be holding warfarin dose. Recheck on the INR 4/26/18.

## 2018-04-26 ENCOUNTER — ANTICOAGULATION THERAPY VISIT (OUTPATIENT)
Dept: ANTICOAGULATION | Facility: OTHER | Age: 83
End: 2018-04-26
Attending: FAMILY MEDICINE
Payer: MEDICARE

## 2018-04-26 ENCOUNTER — TELEPHONE (OUTPATIENT)
Dept: LAB | Facility: OTHER | Age: 83
End: 2018-04-26

## 2018-04-26 DIAGNOSIS — Z95.3 H/O HEART VALVE REPLACEMENT WITH PORCINE VALVE: ICD-10-CM

## 2018-04-26 DIAGNOSIS — I48.20 CHRONIC ATRIAL FIBRILLATION (H): Primary | ICD-10-CM

## 2018-04-26 DIAGNOSIS — Z79.01 LONG TERM CURRENT USE OF ANTICOAGULANT THERAPY: ICD-10-CM

## 2018-04-26 LAB — INR POINT OF CARE: 5.9 (ref 2.5–3.5)

## 2018-04-26 PROCEDURE — 99207 ZZC NO CHARGE NURSE ONLY: CPT

## 2018-04-26 PROCEDURE — 85610 PROTHROMBIN TIME: CPT | Mod: QW

## 2018-04-26 NOTE — MR AVS SNAPSHOT
Milagros Peters   4/26/2018 10:45 AM   Anticoagulation Therapy Visit    Description:  86 year old female   Provider:  PATRICK ANTI COAG 2   Department:  [facility]           INR as of 4/26/2018     Today's INR 5.9!      Anticoagulation Summary as of 4/26/2018     INR goal 2.5-3.5   Today's INR 5.9!   Full instructions 4/26: Hold; 4/27: Hold; 4/28: Hold; 4/29: Hold; Otherwise 5 mg on Mon; 2.5 mg all other days   Next INR check 4/30/2018    Indications   H/O heart valve replacement with porcine valve [Z95.3]  Long term current use of anticoagulant therapy [Z79.01]         Description     HOLD x four days, recheck INR on Monday, May 30th.  Fang Warren ....................  4/26/2018   10:38 AM  Will need to reschedule surgery when INR decreased.   Discussed signs of bleeding, and instructed patient to report symptoms of bleeding.  Go to ER for head injury or severe bleeding.  Will eat salad/dark greens today.      Your next Anticoagulation Clinic appointment(s)     Apr 30, 2018 10:45 AM CDT   Anticoagulation Visit with PATRICK ANTI COAG 1   [facility] ([facility])    [address]  [place] [address]   [phone]              April 2018 Details    Sun Mon Tue Wed Thu Fri Sat     1               2               3               4               5               6               7                 8               9               10               11               12               13               14                 15               16               17               18               19               20               21                 22               23               24               25               26      Hold   See details      27 Hold         28      Hold           29      Hold         30                  Date Details   04/26 This INR check       Date of next INR:  4/30/2018         How to take your warfarin dose     To take:  5 mg Take 1 of the 5 mg tablets.     Hold Do not take your warfarin dose. See the Details table to the right for additional instructions.

## 2018-04-26 NOTE — PROGRESS NOTES
ANTICOAGULATION FOLLOW-UP CLINIC VISIT    Patient Name:  Milagros Peters  Date:  4/26/2018  Contact Type:  Face to Face    SUBJECTIVE:     Patient Findings     Positives No Problem Findings           OBJECTIVE    INR Protime   Date Value Ref Range Status   04/26/2018 5.9 (A) 2.5 - 3.5 Final       ASSESSMENT / PLAN  INR assessment SUPRA    Recheck INR In: 4 DAYS    INR Location Clinic      Anticoagulation Summary as of 4/26/2018     INR goal 2.5-3.5   Today's INR 5.9!   Maintenance plan 5 mg (5 mg x 1) on Mon; 2.5 mg (2.5 mg x 1) all other days   Full instructions 4/26: Hold; 4/27: Hold; 4/28: Hold; 4/29: Hold; Otherwise 5 mg on Mon; 2.5 mg all other days   Weekly total 20 mg   Plan last modified Arabella Longo RN (4/11/2018)   Next INR check 4/30/2018   Priority INR   Target end date Indefinite    Indications   H/O heart valve replacement with porcine valve [Z95.3]  Long term current use of anticoagulant therapy [Z79.01]         Anticoagulation Episode Summary     INR check location     Preferred lab     Send INR reminders to  INR    Comments       Anticoagulation Care Providers     Provider Role Specialty Phone number    Raisa Madrid MD [facility] [phone]            See the Encounter Report to view Anticoagulation Flowsheet and Dosing Calendar (Go to Encounters tab in chart review, and find the Anticoagulation Therapy Visit)        Fang Warren RN             patient should see internal medicine and establish care.  RAISA MADRID MD

## 2018-04-26 NOTE — Clinical Note
INR 5.9 today, instructed her to HOLD x four days, recheck Monday.  Per surgery, planned scopes for tomorrow will be rescheduled when INR decreased.  She will eat salad/green veggies today.  If you would like anything else, let protime clinic know.  Thanks, Fang, ext [phone]

## 2018-04-30 ENCOUNTER — ANESTHESIA EVENT (OUTPATIENT)
Dept: SURGERY | Facility: OTHER | Age: 83
End: 2018-04-30
Payer: MEDICARE

## 2018-04-30 ENCOUNTER — ANESTHESIA (OUTPATIENT)
Dept: SURGERY | Facility: OTHER | Age: 83
End: 2018-04-30
Payer: MEDICARE

## 2018-04-30 ENCOUNTER — HOSPITAL ENCOUNTER (OUTPATIENT)
Facility: OTHER | Age: 83
Discharge: HOME | End: 2018-04-30
Attending: SURGERY | Admitting: SURGERY
Payer: MEDICARE

## 2018-04-30 ENCOUNTER — SURGERY (OUTPATIENT)
Age: 83
End: 2018-04-30

## 2018-04-30 VITALS
WEIGHT: 124.38 LBS | DIASTOLIC BLOOD PRESSURE: 103 MMHG | BODY MASS INDEX: 23.89 KG/M2 | OXYGEN SATURATION: 94 % | SYSTOLIC BLOOD PRESSURE: 164 MMHG | HEART RATE: 75 BPM | TEMPERATURE: 97.2 F

## 2018-04-30 DIAGNOSIS — Z95.2 S/P AVR: Primary | ICD-10-CM

## 2018-04-30 DIAGNOSIS — K20.90 ESOPHAGITIS: Primary | ICD-10-CM

## 2018-04-30 LAB — INR PPP: 1.71 (ref 0–1.3)

## 2018-04-30 PROCEDURE — 43239 EGD BIOPSY SINGLE/MULTIPLE: CPT | Performed by: SURGERY

## 2018-04-30 PROCEDURE — 85610 PROTHROMBIN TIME: CPT | Performed by: SURGERY

## 2018-04-30 PROCEDURE — 40000010 ZZH STATISTIC ANES STAT CODE-CRNA PER MINUTE: Performed by: SURGERY

## 2018-04-30 PROCEDURE — 99100 ANES PT EXTEME AGE<1 YR&>70: CPT | Performed by: NURSE ANESTHETIST, CERTIFIED REGISTERED

## 2018-04-30 PROCEDURE — 25000132 ZZH RX MED GY IP 250 OP 250 PS 637: Mod: GY | Performed by: SURGERY

## 2018-04-30 PROCEDURE — 25000128 H RX IP 250 OP 636: Performed by: NURSE ANESTHETIST, CERTIFIED REGISTERED

## 2018-04-30 PROCEDURE — 36415 COLL VENOUS BLD VENIPUNCTURE: CPT | Performed by: SURGERY

## 2018-04-30 PROCEDURE — 25000128 H RX IP 250 OP 636: Performed by: SURGERY

## 2018-04-30 PROCEDURE — 27210995 ZZH RX 272: Performed by: SURGERY

## 2018-04-30 PROCEDURE — 88305 TISSUE EXAM BY PATHOLOGIST: CPT

## 2018-04-30 PROCEDURE — 25000125 ZZHC RX 250: Performed by: SURGERY

## 2018-04-30 PROCEDURE — 43239 EGD BIOPSY SINGLE/MULTIPLE: CPT | Performed by: NURSE ANESTHETIST, CERTIFIED REGISTERED

## 2018-04-30 PROCEDURE — 25000125 ZZHC RX 250: Performed by: NURSE ANESTHETIST, CERTIFIED REGISTERED

## 2018-04-30 RX ORDER — PROPOFOL 10 MG/ML
INJECTION, EMULSION INTRAVENOUS CONTINUOUS PRN
Status: DISCONTINUED | OUTPATIENT
Start: 2018-04-30 | End: 2018-04-30

## 2018-04-30 RX ORDER — PROPOFOL 10 MG/ML
INJECTION, EMULSION INTRAVENOUS PRN
Status: DISCONTINUED | OUTPATIENT
Start: 2018-04-30 | End: 2018-04-30

## 2018-04-30 RX ORDER — SODIUM CHLORIDE, SODIUM LACTATE, POTASSIUM CHLORIDE, CALCIUM CHLORIDE 600; 310; 30; 20 MG/100ML; MG/100ML; MG/100ML; MG/100ML
INJECTION, SOLUTION INTRAVENOUS CONTINUOUS
Status: DISCONTINUED | OUTPATIENT
Start: 2018-04-30 | End: 2018-04-30 | Stop reason: HOSPADM

## 2018-04-30 RX ORDER — SUCRALFATE 1 G/1
1 TABLET ORAL 4 TIMES DAILY
Qty: 40 TABLET | Refills: 1 | Status: SHIPPED | OUTPATIENT
Start: 2018-04-30 | End: 2018-05-10

## 2018-04-30 RX ORDER — LIDOCAINE 40 MG/G
CREAM TOPICAL
Status: DISCONTINUED | OUTPATIENT
Start: 2018-04-30 | End: 2018-04-30 | Stop reason: HOSPADM

## 2018-04-30 RX ORDER — ONDANSETRON 2 MG/ML
4 INJECTION INTRAMUSCULAR; INTRAVENOUS
Status: DISCONTINUED | OUTPATIENT
Start: 2018-04-30 | End: 2018-04-30 | Stop reason: HOSPADM

## 2018-04-30 RX ORDER — NALOXONE HYDROCHLORIDE 0.4 MG/ML
.1-.4 INJECTION, SOLUTION INTRAMUSCULAR; INTRAVENOUS; SUBCUTANEOUS
Status: DISCONTINUED | OUTPATIENT
Start: 2018-04-30 | End: 2018-04-30 | Stop reason: HOSPADM

## 2018-04-30 RX ORDER — SIMETHICONE
LIQUID (ML) MISCELLANEOUS PRN
Status: DISCONTINUED | OUTPATIENT
Start: 2018-04-30 | End: 2018-04-30 | Stop reason: HOSPADM

## 2018-04-30 RX ORDER — LIDOCAINE HYDROCHLORIDE 20 MG/ML
INJECTION, SOLUTION INFILTRATION; PERINEURAL PRN
Status: DISCONTINUED | OUTPATIENT
Start: 2018-04-30 | End: 2018-04-30

## 2018-04-30 RX ORDER — SUCRALFATE ORAL 1 G/10ML
1 SUSPENSION ORAL 4 TIMES DAILY
Qty: 420 ML | Refills: 1 | Status: SHIPPED | OUTPATIENT
Start: 2018-04-30 | End: 2018-04-30

## 2018-04-30 RX ORDER — FLUMAZENIL 0.1 MG/ML
0.2 INJECTION, SOLUTION INTRAVENOUS
Status: DISCONTINUED | OUTPATIENT
Start: 2018-04-30 | End: 2018-04-30 | Stop reason: HOSPADM

## 2018-04-30 RX ADMIN — Medication 0.1 ML: at 13:14

## 2018-04-30 RX ADMIN — LIDOCAINE HYDROCHLORIDE 0.1 ML: 10 INJECTION, SOLUTION EPIDURAL; INFILTRATION; INTRACAUDAL; PERINEURAL at 12:45

## 2018-04-30 RX ADMIN — SODIUM CHLORIDE, SODIUM LACTATE, POTASSIUM CHLORIDE, AND CALCIUM CHLORIDE: 600; 310; 30; 20 INJECTION, SOLUTION INTRAVENOUS at 12:45

## 2018-04-30 RX ADMIN — LIDOCAINE HYDROCHLORIDE 40 MG: 20 INJECTION, SOLUTION INFILTRATION; PERINEURAL at 13:09

## 2018-04-30 RX ADMIN — PROPOFOL 100 MCG/KG/MIN: 10 INJECTION, EMULSION INTRAVENOUS at 13:09

## 2018-04-30 RX ADMIN — WATER 100 ML: 1 IRRIGANT IRRIGATION at 13:14

## 2018-04-30 RX ADMIN — PROPOFOL 50 MG: 10 INJECTION, EMULSION INTRAVENOUS at 13:09

## 2018-04-30 NOTE — IP AVS SNAPSHOT
MRN:[id number]                      After Visit Summary   4/30/2018    Milagros Peters    MRN: [id number]           Thank you!     Thank you for choosing [facility] for your care. Our goal is always to provide you with excellent care. Hearing back from our patients is one way we can continue to improve our services. Please take a few minutes to complete the written survey that you may receive in the mail after you visit with us. Thank you!        Patient Information     Date Of Birth          12/23/1931        Designated Caregiver       Most Recent Value    Caregiver    Will someone help with your care after discharge? yes    Name of designated caregiver dtr - sharee    Phone number of caregiver [phone]      About your hospital stay     You were admitted on:  April 30, 2018 You last received care in the:  [facility]    You were discharged on:  April 30, 2018       Who to Call     For medical emergencies, please call 911.  For non-urgent questions about your medical care, please call your primary care provider or clinic, [phone]  For questions related to your surgery, please call your surgery clinic        Attending Provider     Provider Specialty    Moise Abbott MD Surgery       Primary Care Provider Office Phone # Fax #    Raisa Madrid [phone]      After Care Instructions     Discharge Instructions       No driving or operating machinery until the day after procedure..postfiber            Discharge Instructions       Resume pre procedure diet and medications.  Your doctor recommends that you eat 25 to 30 Grams of fiber daily. The following are some examples of fiber amounts in different foods.    Fruits: Apple (with skin) 1 medium = 4.4 Grams   Banana      1 medium = 3.1 Grams   Oranges     1 orange = 3.1 Grams   Prunes     1 cup, pitted = 12.4 Grams    Juices: Apple, unsweetened w/ added ascorbic acid  1 cup = 0.5 Grams    Grapefruit, white,  canned,sweetened  1 cup = 0.2 Grams    Grape, unsweetened w/ added ascorbic acid  1 cup = 0.5 Grams    Orange     1 cup = 0.7 Grams    Vegetables:   Cooked: Green Beans   1 cup = 4.0 Grams       Carrots   1/2 cup sliced = 2.3 Grams       Peas       1 cup = 8.8 Grams       Potato (baked, with skin)  1 medium = 3.8 Grams    Raw: Cucumber (with peel)  1 cucumber = 1.5 Grams            Lettuce     1 cup shredded = 0.5 Grams            Tomato   1 medium tomato = 1.5 Grams            Spinach  1 cup = 0.7 Grams    Legumes: Baked beans, canned, no salt added  1 cup = 13.9 Grams         Kidney Beans, canned  1 cup = 13.6 Grams         Carrillo Beans, canned     1 cup = 11.6 Grams         Lentils, boiled   1 cup = 15.6 Grams    Breads, Pastas, Flours: Bran muffins   1 medium muffin = 5.2 Grams           Oatmeal, cooked  1 cup = 4.0 Grams           White Bread   1 slice = 0.6 Grams           Whole- wheat bread = 1.9 Grams    Pasta and rice, cooked: Macaroni  1 cup = 2.5 Grams           Rice, Brown  1 cup = 3.5 Grams           Rice, white   1 cup = 0.6 Grams           Spaghetti (regular) 1 cup = 2.5 Grams    Nuts: Almonds   1 cup = 17.4 Grams            Peanuts    1 cup = 12.4 Grams            Discharge Instructions       Call [phone] for questions or concerns. Call for increased abdominal pain, rectal bleeding, persistent vomiting or fever over 101.5 F  You will receive a letter in about one week with results.                  Your next 10 appointments already scheduled     May 03, 2018  9:50 AM CDT   LAB with GH LAB   [facility] ([facility])    [address]  [facility] [id number] [phone]           Please do not eat 10-12 hours before your appointment if you are coming in fasting for labs on lipids, cholesterol, or glucose (sugar). This does not apply to pregnant women. Water, hot tea and black coffee (with nothing added) are okay. Do not drink other fluids, diet soda  "or chew gum.              Pending Results     No orders found from 2018 to 2018.            Admission Information     Date & Time Provider Department Dept. Phone    2018 Moise Abbott MD [facility] [phone]      Your Vitals Were     Blood Pressure Pulse Temperature Weight Pulse Oximetry BMI (Body Mass Index)    130/47 (Cuff Size: Adult Regular) 75 97.2  F (36.2  C) (Temporal) 56.4 kg (124 lb 6 oz) 94% 23.89 kg/m2      MyCharZUGGI Information     [employer] (now [employer]) lets you send messages to your doctor, view your test results, renew your prescriptions, schedule appointments and more. To sign up, go to [email or web address] (now [email or web address]) . Click on \"Log in\" on the left side of the screen, which will take you to the Welcome page. Then click on \"Sign up Now\" on the right side of the page.     You will be asked to enter the access code listed below, as well as some personal information. Please follow the directions to create your username and password.     Your access code is: [email or web address]  Expires: 7/3/2018  9:26 AM     Your access code will  in 90 days. If you need help or a new code, please call your [place] clinic or [phone].        Care EveryWhere ID     This is your Care EveryWhere ID. This could be used by other organizations to access your [place] medical records  [id number]        Equal Access to Services     CHANDA DONOHUE AH: Hadii martin muniz hadasho Sojovitaali, waaxda luqadaha, qaybta kaalmada adeegyada, charis patterson adebryce alvarez . So [facility] [phone].    ATENCIÓN: Si habla español, tiene a singh disposición servicios gratuitos de asistencia lingüística. Llame al [phone].    We comply with applicable federal civil rights laws and [place] laws. We do not discriminate on the basis of race, color, national origin, age, disability, sex, sexual orientation, or gender identity.               Review of your medicines      UNREVIEWED medicines. Ask your doctor about these " medicines        Dose / Directions    aspirin EC 81 MG EC tablet        Dose:  81 mg   Take 81 mg by mouth daily with food   Refills:  0       ferrous gluconate 324 (38 Fe) MG tablet   Commonly known as:  FERGON        Dose:  324 mg   Take 1 tablet (324 mg) by mouth daily (with breakfast)   Quantity:  30 tablet   Refills:  1       TENORMIN 50 MG tablet   Generic drug:  atenolol        Dose:  50 mg   Take 50 mg by mouth 2 times daily   Refills:  0       * warfarin 5 MG tablet   Commonly known as:  COUMADIN        HOLD x four days   Refills:  0       * warfarin 2.5 MG tablet   Commonly known as:  COUMADIN   Used for:  H/O heart valve replacement with porcine valve        Take 5 mg x 1 day and 2.5 mg x 6 days/week or as directed by [facility] clinic   Quantity:  100 tablet   Refills:  1       * Notice:  This list has 2 medication(s) that are the same as other medications prescribed for you. Read the directions carefully, and ask your doctor or other care provider to review them with you.      START taking        Dose / Directions    omeprazole 20 MG CR capsule   Commonly known as:  priLOSEC   Used for:  Esophagitis        Dose:  20 mg   Take 1 capsule (20 mg) by mouth daily   Quantity:  90 capsule   Refills:  3       sucralfate 1 GM/10ML suspension   Commonly known as:  CARAFATE   Used for:  Esophagitis        Dose:  1 g   Take 10 mLs (1 g) by mouth 4 times daily   Quantity:  420 mL   Refills:  1            Where to get your medicines      These medications were sent to [facility] [address] [place], [place] - [address], [facility] [address]     Phone:  [phone]     omeprazole 20 MG CR capsule    sucralfate 1 GM/10ML suspension                Protect others around you: Learn how to safely use, store and throw away your medicines at www.disposemymeds.org.             Medication List: This is a list of all your medications and when to take them. Check marks below  indicate your daily home schedule. Keep this list as a reference.      Medications           Morning Afternoon Evening Bedtime As Needed    aspirin EC 81 MG EC tablet   Take 81 mg by mouth daily with food                                ferrous gluconate 324 (38 Fe) MG tablet   Commonly known as:  FERGON   Take 1 tablet (324 mg) by mouth daily (with breakfast)                                omeprazole 20 MG CR capsule   Commonly known as:  priLOSEC   Take 1 capsule (20 mg) by mouth daily                                sucralfate 1 GM/10ML suspension   Commonly known as:  CARAFATE   Take 10 mLs (1 g) by mouth 4 times daily                                TENORMIN 50 MG tablet   Take 50 mg by mouth 2 times daily   Generic drug:  atenolol                                * warfarin 5 MG tablet   Commonly known as:  COUMADIN   HOLD x four days                                * warfarin 2.5 MG tablet   Commonly known as:  COUMADIN   Take 5 mg x 1 day and 2.5 mg x 6 days/week or as directed by protime clinic                                * Notice:  This list has 2 medication(s) that are the same as other medications prescribed for you. Read the directions carefully, and ask your doctor or other care provider to review them with you.

## 2018-04-30 NOTE — INTERVAL H&P NOTE
I saw and examined Milagros Peters.  I have reviewed the history and physical and find no changes to the patient's medical status or condition with the exceptions noted below.     Moise Abbott   12:25 PM 4/30/2018

## 2018-04-30 NOTE — IP AVS SNAPSHOT
[facility]    [address]    [place] [address]    Phone:  [phone]    Fax:  [phone]                                       After Visit Summary   4/30/2018    Milagros Peters    MRN: [id number]           After Visit Summary Signature Page     I have received my discharge instructions, and my questions have been answered. I have discussed any challenges I see with this plan with the nurse or doctor.    ..........................................................................................................................................  Patient/Patient Representative Signature      ..........................................................................................................................................  Patient Representative Print Name and Relationship to Patient    ..................................................               ................................................  Date                                            Time    ..........................................................................................................................................  Reviewed by Signature/Title    ...................................................              ..............................................  Date                                                            Time

## 2018-04-30 NOTE — ANESTHESIA PREPROCEDURE EVALUATION
Anesthesia Evaluation     .             ROS/MED HX    ENT/Pulmonary:  - neg pulmonary ROS     Neurologic:     (+)dementia,     Cardiovascular:     (+) hypertension----. Taking blood thinners : . . . :. valvular problems/murmurs .       METS/Exercise Tolerance:  1 - Eating, dressing   Hematologic:         Musculoskeletal:         GI/Hepatic:  - neg GI/hepatic ROS       Renal/Genitourinary:  - ROS Renal section negative       Endo:  - neg endo ROS       Psychiatric:  - neg psychiatric ROS       Infectious Disease:  - neg infectious disease ROS       Malignancy:      - no malignancy   Other:    - neg other ROS                 Physical Exam  Normal systems: pulmonary    Airway   Mallampati: I  TM distance: >3 FB  Neck ROM: full    Dental   (+) upper dentures and partials  Comment: Lower partial.     Cardiovascular   Rhythm and rate: regular and normal      Pulmonary    breath sounds clear to auscultation                    Anesthesia Plan      History & Physical Review      ASA Status:  3 .    NPO Status:  > 6 hours    Plan for MAC     Patient wishes to rescind her DNR order throughout the operative period.        Postoperative Care      Consents  Anesthetic plan, risks, benefits and alternatives discussed with:  Patient..                          .

## 2018-04-30 NOTE — H&P (VIEW-ONLY)
GENERAL SURGERY CONSULTATION NOTE    Milagros Peters   [address] [place] [address]  86 year old  female  Admission Date/Time: No admission date for patient encounter.  Primary Care Provider:  Raisa Madrid was asked to see this patient by Dr. Livingston for evaluation of esophageal mass.     HPI: Milagros Peters is a 86 year old female who presented to the ED with what was determined to be diverticulitis and was found to have an ill defined esophageal mass and abnormal spleen.  She is feeling okay from the diverticulitis, but cannot swallow the Augmentin tabs. She also could not eat a pork chop last night.  She normally eats softer foods and does not notice dysphagia with that.  She had a fall a few weeks back and has right rib pain.  This is improving.  She is with her daughter who she lives with.     REVIEW OF SYSTEMS:    GENERAL: No fevers or chills. Denies fatigue, recent weight loss.  HEENT: No sinus drainage. No changes with vision or hearing. No difficulty swallowing.   LYMPHATICS:  No swollen nodes in axilla, neck or groin.  CARDIOVASCULAR: Denies chest pain, palpitations and dyspnea on exertion.  PULMONARY: No shortness of breath or cough. No increase in sputum production.  GI: Denies melena,bright red blood in stools. No hematemesis. No constipation or diarrhea.  : No dysuria or hematuria.  SKIN: No recent rashes or ulcers.   HEMATOLOGY:  On warfarin   ENDOCRINE:  No history of diabetes or thyroid problems.  NEUROLOGY:  No history of seizures or headaches. ++ Dementia         Patient Active Problem List   Diagnosis     H/O heart valve replacement with porcine valve     Long term current use of anticoagulant therapy     Anticoagulation monitoring, INR range 2-3     Heart valve replaced     Profound impairment, one eye, impairment level not further specified     DNR no code (do not resuscitate)     Hyperlipidemia     Hypertension     S/P AVR     Iron deficiency anemia due to chronic blood loss      Memory loss       Past Medical History:   Diagnosis Date     Essential (primary) hypertension     No Comments Provided     Hyperlipidemia     No Comments Provided     Nonrheumatic aortic valve disorder     No Comments Provided     Osteoarthritis     No Comments Provided     Pneumonia     5/17/2012       Past Surgical History:   Procedure Laterality Date     APPENDECTOMY OPEN      childhood     HYSTERECTOMY TOTAL ABDOMINAL      1994,kept ovaries     OTHER SURGICAL HISTORY      2003,,AORTIC VALVE REPLACEMENT     TONSILLECTOMY      childhood       Family History   Problem Relation Age of Onset     CANCER Mother      Cancer     Asthma Mother      Asthma     HEART DISEASE Father      Heart Disease,heart problems       Social History     Social History Narrative    Garland  Is preferred name.     Okay to talk to Sarah Suresh  [phone] in a medical emergency;  Daughter     Moved to [place].  Lives with daughter in [place] [facility].   [place] has Blog[place] dogs.   Best Jenn    a;   [employer] [address] in [facility] ;  Worked at grocery, telephone company store.      [place] [employer] man, [place]  Approximately a month       1952;  Ran numbers          2 sons in [place]  daughter in the area           Social History     Social History     Marital status:      Spouse name: N/A     Number of children: N/A     Years of education: N/A     Occupational History     Not on file.     Social History Main Topics     Smoking status: Never Smoker     Smokeless tobacco: Never Used     Alcohol use No     Drug use: No      Comment: Drug use: No     Sexual activity: Not on file     Other Topics Concern     Not on file     Social History Narrative    [place]  Is preferred name.     Okay to talk to Sarah Suresh  [phone] in a medical emergency;  Daughter     Moved to [place].  Lives with daughter in [place] [facility].   [employer] [place] has  best hot dogs.   [employer] P[employer]    a;   [employer] [address] in [place] ;  Worked at [employer], telephone company store.      [place] [employer] man, [place]  Approximately a month       1952;  Ran numbers          2 sons in [place]  daughter in the area             Current Outpatient Prescriptions on File Prior to Visit:  aspirin EC 81 MG EC tablet Take 81 mg by mouth daily with food   atenolol (TENORMIN) 50 MG tablet Take 50 mg by mouth 2 times daily   ferrous gluconate (FERGON) 324 (38 FE) MG tablet Take 1 tablet (324 mg) by mouth daily (with breakfast)   warfarin (COUMADIN) 2.5 MG tablet Take 5 mg x 1 day and 2.5 mg x 6 days/week or as directed by [facility]   warfarin (COUMADIN) 5 MG tablet Take 5 mg x 2 days/week and 2.5 mg x 5 days/week.  Or as directed by the [facility].     No current facility-administered medications on file prior to visit.       ALLERGIES/SENSITIVITIES:   Allergies   Allergen Reactions     Neosporin [Neomycin-Polymyx-Gramicid]        PHYSICAL EXAM:     /62 (BP Location: Right arm, Patient Position: Chair, Cuff Size: Adult Regular)  Pulse 62  Wt 127 lb 3.2 oz (57.7 kg)  BMI 24.43 kg/m2    General Appearance:   Appears stated age.  Has short term memory loss.   Heart & CV:  RRR no murmur.  Intact distal pulses, good cap refill.  LUNGS:  CTA B/L, no wheezing or crackles.  Abd:  Flat, soft, no definite mass.   Ext: Thin and frail.       ADDITIONAL COMMENTS:      CONSULTATION ASSESSMENT AND PLAN:    86 year old female with ill defined esophageal mass on CT. After a long discussion of possible pathology, risks of the proceedure, we decided to proceed with EGD for diagnosis.  She does not want to pursue treatment if it is esophogeal cancer.  She would consider a stent.  She does not want a feeding tube. She is recovering from her diverticulitis.     - Switch to liquid Augmentin   - Hold warfarin X 3 days prior      Moise Abbott MD on 4/19/2018 at 12:00 PM         Moise  KILEY Abbott MD on 4/19/2018 at 11:49 AM

## 2018-04-30 NOTE — ANESTHESIA CARE TRANSFER NOTE
Patient: Milagros Peters    Procedure(s):  Gastroscopy w/ Biopsy - Wound Class: II-Clean Contaminated    Diagnosis: Inguinal Mass  Diagnosis Additional Information: No value filed.    Anesthesia Type:   MAC     Note:  Airway :Room Air  Patient transferred to:Phase II  Handoff Report: Identifed the Patient, Identified the Reponsible Provider, Reviewed the pertinent medical history, Discussed the surgical course, Reviewed Intra-OP anesthesia mangement and issues during anesthesia, Set expectations for post-procedure period and Allowed opportunity for questions and acknowledgement of understanding      Vitals: (Last set prior to Anesthesia Care Transfer)    CRNA VITALS  4/30/2018 1249 - 4/30/2018 1327      4/30/2018             Pulse: 79    Ht Rate: 82    SpO2: 99 %    Resp Rate (set): 10                Electronically Signed By: JESSI GOODMAN CRNA  April 30, 2018  1:27 PM

## 2018-04-30 NOTE — OP NOTE
PROCEDURE NOTE    SURGEON:Moise Abbott    PRE-OP DIAGNOSIS:   Distal esophageal thickening, Dysphagia       POST-OP DIAGNOSIS: Distal esophageal thickening, Dysphagia    PROCEDURE PLANNED:   Diagnostic EGD, flexible        PROCEDURE PERFORMED:  EGD with biopsy, flexible    SPECIMEN:  Duodenum, Antrum, Distale esophagus.     ANESTHESIA: See anesthesia record, Coverage requested due to: age over 70     ESTIMATED BLOOD LOSS: None    COMPLICATIONS:  None    INDICATION FOR THE PROCEDURE:The patient is a 86 year oldfemale. The patient presents with distal esophageal thickening, and dysphagia. I explained to the patient the risks, benefits and alternatives to diagnostic EGD for evaluating for esophageal malignancy. We specifically discussed the risks of bleeding, infection,perforation and the risks of sedation. The patient's questions were answered and the patient wished to proceed. Informed consent paperwork was completed.    PROCEDURE: The patient was taken to the endoscopy suite. Appropriate monitors were attached. The patient was placed in the left lateral decubitus position. Bite block was positioned.Timeout was performed confirming the patient's identity and procedure to be performed. After appropriate sedation was confirmed, the flexible endoscope was advanced into the oropharynx. The posterior oropharynx appeared grossly normal. The scope was advanced into the proximal esophagus. The esophagus was insufflated with air. The scope was advanced under direct visualization. No acute abnormalities of the esophagus were noted. The scope was advanced into the stomach. The scope was advanced through the pylorus into the duodenal bulb. The bulb and distal duodenum appeared grossly normal.  The scope was withdrawn back into the stomach. Antral biopsy was obtained and sent to pathology. The scope was retroflexed and the GE junction inspected. No abnormalities were noted.  The scope was returned to a neutral position and the  stomach was decompressed. The scope was withdrawn to the GE junction and biopsy obtained.  There was a 2 cm segment of LA Grade C change to the distal esophagus.  The mucosa of the esophagus was inspected while withdrawing the scope. No abnormalities were noted. The scope was withdrawn from the patient. The bite block was removed. The patient tolerated the procedure with no immediately apparent complication. The patient was taken to recovery instable condition.    FOLLOW UP:  RECOMMENDATIONS FU pathology, PPi and Carafate X 6 weeks     Moise Abbott

## 2018-05-01 RX ORDER — WARFARIN SODIUM 5 MG/1
TABLET ORAL
Qty: 80 TABLET | Refills: 1 | Status: SHIPPED | OUTPATIENT
Start: 2018-05-01 | End: 2018-05-10

## 2018-05-01 NOTE — TELEPHONE ENCOUNTER
Prescription approved per [facility] Refill Protocol.  Arabella Longo RN    5/1/2018  2:32 PM

## 2018-05-01 NOTE — TELEPHONE ENCOUNTER
Prescription approved per [facility] Refill Protocol.  Arabella Longo RN    5/1/2018  2:30 PM

## 2018-05-03 ENCOUNTER — ANTICOAGULATION THERAPY VISIT (OUTPATIENT)
Dept: ANTICOAGULATION | Facility: OTHER | Age: 83
End: 2018-05-03
Attending: FAMILY MEDICINE
Payer: MEDICARE

## 2018-05-03 DIAGNOSIS — K29.50 HELICOBACTER PYLORI GASTRITIS (CHRONIC GASTRITIS): Primary | ICD-10-CM

## 2018-05-03 DIAGNOSIS — B96.81 HELICOBACTER PYLORI GASTRITIS (CHRONIC GASTRITIS): Primary | ICD-10-CM

## 2018-05-03 DIAGNOSIS — Z95.3 H/O HEART VALVE REPLACEMENT WITH PORCINE VALVE: ICD-10-CM

## 2018-05-03 DIAGNOSIS — D50.0 BLOOD LOSS ANEMIA: Primary | ICD-10-CM

## 2018-05-03 DIAGNOSIS — I48.20 CHRONIC ATRIAL FIBRILLATION (H): ICD-10-CM

## 2018-05-03 DIAGNOSIS — Z79.01 LONG TERM CURRENT USE OF ANTICOAGULANT THERAPY: ICD-10-CM

## 2018-05-03 LAB
ERYTHROCYTE [DISTWIDTH] IN BLOOD BY AUTOMATED COUNT: 18.9 % (ref 10–15)
HCT VFR BLD AUTO: 29.3 % (ref 35–47)
HGB BLD-MCNC: 8.5 G/DL (ref 11.7–15.7)
INR POINT OF CARE: 1.6 (ref 0.86–1.14)
MCH RBC QN AUTO: 20 PG (ref 26.5–33)
MCHC RBC AUTO-ENTMCNC: 29 G/DL (ref 31.5–36.5)
MCV RBC AUTO: 69 FL (ref 78–100)
PLATELET # BLD AUTO: 366 10E9/L (ref 150–450)
RBC # BLD AUTO: 4.24 10E12/L (ref 3.8–5.2)
WBC # BLD AUTO: 12.5 10E9/L (ref 4–11)

## 2018-05-03 PROCEDURE — 36415 COLL VENOUS BLD VENIPUNCTURE: CPT | Performed by: FAMILY MEDICINE

## 2018-05-03 PROCEDURE — 85027 COMPLETE CBC AUTOMATED: CPT | Performed by: FAMILY MEDICINE

## 2018-05-03 PROCEDURE — 85610 PROTHROMBIN TIME: CPT | Mod: QW

## 2018-05-03 RX ORDER — AMOXICILLIN 500 MG/1
1000 CAPSULE ORAL 2 TIMES DAILY
Qty: 28 CAPSULE | Refills: 0 | Status: SHIPPED | OUTPATIENT
Start: 2018-05-03 | End: 2018-05-10

## 2018-05-03 RX ORDER — CLARITHROMYCIN 500 MG
500 TABLET ORAL 2 TIMES DAILY
Qty: 28 TABLET | Refills: 0 | Status: SHIPPED | OUTPATIENT
Start: 2018-05-03 | End: 2018-05-17

## 2018-05-03 NOTE — PROGRESS NOTES
ANTICOAGULATION FOLLOW-UP CLINIC VISIT    Patient Name:  Milagros Peters  Date:  5/3/2018  Contact Type:  Face to Face    SUBJECTIVE:     Patient Findings     Positives Change in medications (started on carafate and omprezole 4/30/18)           OBJECTIVE    INR Protime   Date Value Ref Range Status   05/03/2018 1.6 (A) 0.86 - 1.14 Final       ASSESSMENT / PLAN  INR assessment SUB    Recheck INR In: 1 WEEK    INR Location Clinic      Anticoagulation Summary as of 5/3/2018     INR goal 2.5-3.5   Today's INR 1.6!   Maintenance plan 2.5 mg (2.5 mg x 1) every day   Full instructions 2.5 mg every day   Weekly total 17.5 mg   Plan last modified Arabella Longo, PAVEL (5/3/2018)   Next INR check 5/10/2018   Priority INR   Target end date Indefinite    Indications   H/O heart valve replacement with porcine valve [Z95.3]  Long term current use of anticoagulant therapy [Z79.01]         Anticoagulation Episode Summary     INR check location     Preferred lab     Send INR reminders to  INR    Comments       Anticoagulation Care Providers     Provider Role Specialty Phone number    Raisa Madrid MD [facility] [phone]            See the Encounter Report to view Anticoagulation Flowsheet and Dosing Calendar (Go to Encounters tab in chart review, and find the Anticoagulation Therapy Visit)        Arabella Longo, RN

## 2018-05-03 NOTE — MR AVS SNAPSHOT
Milagros Peters   5/3/2018 9:30 AM   Anticoagulation Therapy Visit    Description:  86 year old female   Provider:  [facility]   Department:  [facility]           INR as of 5/3/2018     Today's INR 1.6!      Anticoagulation Summary as of 5/3/2018     INR goal 2.5-3.5   Today's INR 1.6!   Full instructions 2.5 mg every day   Next INR check 5/10/2018    Indications   H/O heart valve replacement with porcine valve [Z95.3]  Long term current use of anticoagulant therapy [Z79.01]         Description     Continue 2.5 mg  And recheck in 1 week. Arabella Longo RN    5/3/2018  9:44 AM        May 2018 Details    Sun Mon Tue Wed Thu Fri Sat       1               2               3      2.5 mg   See details      4      2.5 mg         5      2.5 mg           6      2.5 mg         7      2.5 mg         8      2.5 mg         9      2.5 mg         10            11               12                 13               14               15               16               17               18               19                 20               21               22               23               24               25               26                 27               28               29               30               31                  Date Details   05/03 This INR check       Date of next INR:  5/10/2018         How to take your warfarin dose     To take:  2.5 mg Take 1 of the 2.5 mg tablets.

## 2018-05-09 ENCOUNTER — TELEPHONE (OUTPATIENT)
Dept: FAMILY MEDICINE | Facility: OTHER | Age: 83
End: 2018-05-09

## 2018-05-09 NOTE — TELEPHONE ENCOUNTER
Patient has dementia, she has a really hard time remembering to take the Carafate and she hates it anyway so it's a struggle getting her to take it.    Is there another medication option she could take for the H-pilori?    Also, does she need to keep taking the iron tablets?    Should she have her HGB checked anytime soon?    Fang Zaragoza LPN  5/9/2018  1:51 PM

## 2018-05-09 NOTE — TELEPHONE ENCOUNTER
Left message to call back  Bentley LEÓN ...................  5/9/2018   1:43 PM            Patient was given results on 5/3/18.    Here are the results:        Notes Recorded by Moise Abbott MD on 5/3/2018 at 10:49 AM  Your hemoglobin is stable at 8.5.  ------    Notes Recorded by Moise Abbott MD on 5/3/2018 at 10:05 AM  Your pathology returned as gastritis. This is inflammation of the stomach.  Common causes include infection with Helicobacter pylori and use of NSAIDs. Less common causes include alcohol, smoking, cocaine, severe illness, aut  oimmune problems, radiation therapy and Crohn's disease. Your H Pylori was positive.

## 2018-05-09 NOTE — TELEPHONE ENCOUNTER
Looking for the results from the biopsy from the endoscopy.  Also she is wondering if the pt should still be taking the iron pills.  Please call

## 2018-05-10 ENCOUNTER — ANTICOAGULATION THERAPY VISIT (OUTPATIENT)
Dept: ANTICOAGULATION | Facility: OTHER | Age: 83
End: 2018-05-10
Attending: FAMILY MEDICINE
Payer: MEDICARE

## 2018-05-10 DIAGNOSIS — Z95.2 S/P AVR: ICD-10-CM

## 2018-05-10 DIAGNOSIS — Z95.3 H/O HEART VALVE REPLACEMENT WITH PORCINE VALVE: ICD-10-CM

## 2018-05-10 LAB — INR POINT OF CARE: 2.8 (ref 2.5–3.5)

## 2018-05-10 PROCEDURE — 85610 PROTHROMBIN TIME: CPT | Mod: QW,ZL

## 2018-05-10 PROCEDURE — 99207 ZZC NO CHARGE NURSE ONLY: CPT

## 2018-05-10 RX ORDER — WARFARIN SODIUM 2.5 MG/1
TABLET ORAL
Qty: 100 TABLET | Refills: 1 | COMMUNITY
Start: 2018-05-10

## 2018-05-10 RX ORDER — WARFARIN SODIUM 5 MG/1
TABLET ORAL
Qty: 80 TABLET | Refills: 1 | COMMUNITY
Start: 2018-05-10

## 2018-05-10 NOTE — TELEPHONE ENCOUNTER
Patient's daughter notified.  She had never picked up the original prescriptions for H-pilori.  She will pick them up now and start patient on them.    She does have an appointment for a follow up in a week.    Fang Zaragoza LPN  5/10/2018  12:53 PM

## 2018-05-10 NOTE — MR AVS SNAPSHOT
Milagros Peters   5/10/2018 9:00 AM   Anticoagulation Therapy Visit    Description:  86 year old female   Provider:  [facility]   Department:  [facility]           INR as of 5/10/2018     Today's INR 2.8      Anticoagulation Summary as of 5/10/2018     INR goal 2.5-3.5   Today's INR 2.8   Full instructions 2.5 mg every day   Next INR check 5/16/2018    Indications   H/O heart valve replacement with porcine valve [Z95.3]  Long term current use of anticoagulant therapy [Z79.01]         Description     Continue same dose of Coumadin/Warfarinand recheck INR in 1 week.  Fang Warren ....................  5/10/2018   9:00 AM          May 2018 Details    Sun Mon Tue Wed Thu Fri Sat       1               2               3               4               5                 6               7               8               9               10      2.5 mg   See details      11      2.5 mg         12      2.5 mg           13      2.5 mg         14      2.5 mg         15      2.5 mg         16            17               18               19                 20               21               22               23               24               25               26                 27               28               29               30               31                  Date Details   05/10 This INR check       Date of next INR:  5/16/2018         How to take your warfarin dose     To take:  2.5 mg Take 1 of the 2.5 mg tablets.

## 2018-05-10 NOTE — TELEPHONE ENCOUNTER
Okay to stop carafate.  The H pylori treatment is the acid medication, the amoxil and the clarithromycin.  Iron and HGB are up to PCP.

## 2018-05-10 NOTE — PROGRESS NOTES
ANTICOAGULATION FOLLOW-UP CLINIC VISIT    Patient Name:  Milagros Peters  Date:  5/10/2018  Contact Type:  Face to Face    SUBJECTIVE:     Patient Findings     Positives Change in medications (stopped Carafate and iron supplement)           OBJECTIVE    INR Protime   Date Value Ref Range Status   05/10/2018 2.8 2.5 - 3.5 Final       ASSESSMENT / PLAN  INR assessment THER    Recheck INR In: 1 WEEK    INR Location Clinic      Anticoagulation Summary as of 5/10/2018     INR goal 2.5-3.5   Today's INR 2.8   Maintenance plan 2.5 mg (2.5 mg x 1) every day   Full instructions 2.5 mg every day   Weekly total 17.5 mg   No change documented Fang Warren RN   Plan last modified Arabella Longo RN (5/3/2018)   Next INR check 5/16/2018   Priority INR   Target end date Indefinite    Indications   H/O heart valve replacement with porcine valve [Z95.3]  Long term current use of anticoagulant therapy [Z79.01]         Anticoagulation Episode Summary     INR check location     Preferred lab     Send INR reminders to  INR    Comments       Anticoagulation Care Providers     Provider Role Specialty Phone number    Raisa Madrid MD [facility] [phone]            See the Encounter Report to view Anticoagulation Flowsheet and Dosing Calendar (Go to Encounters tab in chart review, and find the Anticoagulation Therapy Visit)        Fang Warren RN

## 2018-05-16 ENCOUNTER — ANTICOAGULATION THERAPY VISIT (OUTPATIENT)
Dept: ANTICOAGULATION | Facility: OTHER | Age: 83
End: 2018-05-16
Attending: FAMILY MEDICINE
Payer: MEDICARE

## 2018-05-16 DIAGNOSIS — Z79.01 LONG TERM CURRENT USE OF ANTICOAGULANT THERAPY: ICD-10-CM

## 2018-05-16 DIAGNOSIS — Z95.3 H/O HEART VALVE REPLACEMENT WITH PORCINE VALVE: ICD-10-CM

## 2018-05-16 LAB — INR POINT OF CARE: 6.4 (ref 0.86–1.14)

## 2018-05-16 PROCEDURE — 99207 ZZC NO CHARGE NURSE ONLY: CPT

## 2018-05-16 PROCEDURE — 85610 PROTHROMBIN TIME: CPT | Mod: QW

## 2018-05-16 NOTE — MR AVS SNAPSHOT
Milagros Peters   5/16/2018 1:30 PM   Anticoagulation Therapy Visit    Description:  86 year old female   Provider:  [facility]   Department:  [facility]           INR as of 5/16/2018     Today's INR 6.4!      Anticoagulation Summary as of 5/16/2018     INR goal 2.5-3.5   Today's INR 6.4!   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 Arabella Longo RN    5/16/2018  1:39 PM        May 2018 Details    Sun Mon Tue Wed Thu Fri Sat       1               2               3               4               5                 6               7               8               9               10               11               12                 13               14               15               16      Hold   See details      17      Hold         18            19                 20               21               22               23               24               25               26                 27               28               29               30               31                  Date Details   05/16 This INR check       Date of next INR:  5/18/2018         How to take your warfarin dose     To take:  2.5 mg Take 1 of the 2.5 mg tablets.    Hold Do not take your warfarin dose. See the Details table to the right for additional instructions.

## 2018-05-16 NOTE — PROGRESS NOTES
ANTICOAGULATION FOLLOW-UP CLINIC VISIT    Patient Name:  Milagros Peters  Date:  5/16/2018  Contact Type:  Face to Face    SUBJECTIVE:     Patient Findings     Positives Antibiotic use or infection (taking amoxicillin and clarithromycin x 14 days for DX of h pylori. stopped carafate and increased omprazole)           OBJECTIVE    INR Protime   Date Value Ref Range Status   05/16/2018 6.4 (A) 0.86 - 1.14 Final       ASSESSMENT / PLAN  INR assessment SUPRA    Recheck INR In: 2 DAYS    INR Location Clinic      Anticoagulation Summary as of 5/16/2018     INR goal 2.5-3.5   Today's INR 6.4!   Maintenance plan 2.5 mg (2.5 mg x 1) every day   Full instructions 5/16: Hold; 5/17: Hold; Otherwise 2.5 mg every day   Weekly total 17.5 mg   Plan last modified Arabella Longo, PAVEL (5/3/2018)   Next INR check 5/18/2018   Priority INR   Target end date Indefinite    Indications   H/O heart valve replacement with porcine valve [Z95.3]  Long term current use of anticoagulant therapy [Z79.01]         Anticoagulation Episode Summary     INR check location     Preferred lab     Send INR reminders to  INR    Comments       Anticoagulation Care Providers     Provider Role Specialty Phone number    Raisa Madrid MD [facility] [phone]            See the Encounter Report to view Anticoagulation Flowsheet and Dosing Calendar (Go to Encounters tab in chart review, and find the Anticoagulation Therapy Visit)        Arabella Longo, RN

## 2018-05-16 NOTE — Clinical Note
FYI patient of Dr Rader, they have not gotten new provider yet. Had increased INR today. She was recently Dx with H pylori and started on antibiotics. Also increased omeprazole. She will be holding warfarin 2 doses and rechecking INR on Fri. No bleeding concerns. Arabella Longo RN    5/16/2018  1:46 PM

## 2018-05-17 ENCOUNTER — ANTICOAGULATION THERAPY VISIT (OUTPATIENT)
Dept: ANTICOAGULATION | Facility: OTHER | Age: 83
End: 2018-05-17
Attending: FAMILY MEDICINE
Payer: MEDICARE

## 2018-05-17 ENCOUNTER — HOSPITAL ENCOUNTER (OUTPATIENT)
Dept: GENERAL RADIOLOGY | Facility: OTHER | Age: 83
Discharge: HOME | End: 2018-05-17
Attending: NURSE PRACTITIONER | Admitting: NURSE PRACTITIONER
Payer: MEDICARE

## 2018-05-17 ENCOUNTER — OFFICE VISIT (OUTPATIENT)
Dept: FAMILY MEDICINE | Facility: OTHER | Age: 83
End: 2018-05-17
Attending: NURSE PRACTITIONER
Payer: MEDICARE

## 2018-05-17 VITALS — HEART RATE: 87 BPM | DIASTOLIC BLOOD PRESSURE: 80 MMHG | TEMPERATURE: 100.2 F | SYSTOLIC BLOOD PRESSURE: 140 MMHG

## 2018-05-17 DIAGNOSIS — M25.532 LEFT WRIST PAIN: ICD-10-CM

## 2018-05-17 DIAGNOSIS — M25.542 ARTHRALGIA OF METACARPOPHALANGEAL JOINT, LEFT: Primary | ICD-10-CM

## 2018-05-17 DIAGNOSIS — Z79.01 LONG TERM CURRENT USE OF ANTICOAGULANT THERAPY: ICD-10-CM

## 2018-05-17 DIAGNOSIS — Z95.3 H/O HEART VALVE REPLACEMENT WITH PORCINE VALVE: ICD-10-CM

## 2018-05-17 DIAGNOSIS — R50.9 FEVER, UNSPECIFIED FEVER CAUSE: ICD-10-CM

## 2018-05-17 LAB
ANION GAP SERPL CALCULATED.3IONS-SCNC: 8 MMOL/L (ref 3–14)
BASOPHILS # BLD AUTO: 0 10E9/L (ref 0–0.2)
BASOPHILS NFR BLD AUTO: 0.2 %
BUN SERPL-MCNC: 21 MG/DL (ref 7–25)
CALCIUM SERPL-MCNC: 9.4 MG/DL (ref 8.6–10.3)
CHLORIDE SERPL-SCNC: 99 MMOL/L (ref 98–107)
CO2 SERPL-SCNC: 30 MMOL/L (ref 21–31)
CREAT SERPL-MCNC: 0.85 MG/DL (ref 0.6–1.2)
CRP SERPL-MCNC: 15.9 MG/L
DIFFERENTIAL METHOD BLD: ABNORMAL
EOSINOPHIL # BLD AUTO: 0 10E9/L (ref 0–0.7)
EOSINOPHIL NFR BLD AUTO: 0.2 %
ERYTHROCYTE [DISTWIDTH] IN BLOOD BY AUTOMATED COUNT: 18.8 % (ref 10–15)
ERYTHROCYTE [SEDIMENTATION RATE] IN BLOOD BY WESTERGREN METHOD: 99 MM/H (ref 1–15)
GFR SERPL CREATININE-BSD FRML MDRD: 63 ML/MIN/1.7M2
GLUCOSE SERPL-MCNC: 112 MG/DL (ref 70–105)
HCT VFR BLD AUTO: 28.2 % (ref 35–47)
HGB BLD-MCNC: 8 G/DL (ref 11.7–15.7)
IMM GRANULOCYTES # BLD: 0 10E9/L (ref 0–0.4)
IMM GRANULOCYTES NFR BLD: 0.3 %
INR POINT OF CARE: 6.3 (ref 0.86–1.14)
LYMPHOCYTES # BLD AUTO: 0.7 10E9/L (ref 0.8–5.3)
LYMPHOCYTES NFR BLD AUTO: 5.7 %
MCH RBC QN AUTO: 19.8 PG (ref 26.5–33)
MCHC RBC AUTO-ENTMCNC: 28.4 G/DL (ref 31.5–36.5)
MCV RBC AUTO: 70 FL (ref 78–100)
MONOCYTES # BLD AUTO: 1.2 10E9/L (ref 0–1.3)
MONOCYTES NFR BLD AUTO: 9.7 %
NEUTROPHILS # BLD AUTO: 10.5 10E9/L (ref 1.6–8.3)
NEUTROPHILS NFR BLD AUTO: 83.9 %
PLATELET # BLD AUTO: 373 10E9/L (ref 150–450)
POTASSIUM SERPL-SCNC: 4.2 MMOL/L (ref 3.5–5.1)
RBC # BLD AUTO: 4.05 10E12/L (ref 3.8–5.2)
SODIUM SERPL-SCNC: 137 MMOL/L (ref 134–144)
URATE SERPL-MCNC: 7.1 MG/DL (ref 4.4–7.6)
WBC # BLD AUTO: 12.5 10E9/L (ref 4–11)

## 2018-05-17 PROCEDURE — 73110 X-RAY EXAM OF WRIST: CPT | Mod: LT

## 2018-05-17 PROCEDURE — 25000132 ZZH RX MED GY IP 250 OP 250 PS 637: Mod: GY | Performed by: NURSE PRACTITIONER

## 2018-05-17 PROCEDURE — 86140 C-REACTIVE PROTEIN: CPT | Performed by: NURSE PRACTITIONER

## 2018-05-17 PROCEDURE — 99214 OFFICE O/P EST MOD 30 MIN: CPT | Performed by: NURSE PRACTITIONER

## 2018-05-17 PROCEDURE — 99207 ZZC NO CHARGE NURSE ONLY: CPT

## 2018-05-17 PROCEDURE — 80048 BASIC METABOLIC PNL TOTAL CA: CPT | Performed by: NURSE PRACTITIONER

## 2018-05-17 PROCEDURE — 85025 COMPLETE CBC W/AUTO DIFF WBC: CPT | Performed by: NURSE PRACTITIONER

## 2018-05-17 PROCEDURE — 84550 ASSAY OF BLOOD/URIC ACID: CPT | Performed by: NURSE PRACTITIONER

## 2018-05-17 PROCEDURE — G0463 HOSPITAL OUTPT CLINIC VISIT: HCPCS

## 2018-05-17 PROCEDURE — A9270 NON-COVERED ITEM OR SERVICE: HCPCS | Mod: GY | Performed by: NURSE PRACTITIONER

## 2018-05-17 PROCEDURE — 85652 RBC SED RATE AUTOMATED: CPT | Performed by: NURSE PRACTITIONER

## 2018-05-17 PROCEDURE — 85610 PROTHROMBIN TIME: CPT | Mod: QW,ZL

## 2018-05-17 PROCEDURE — G0463 HOSPITAL OUTPT CLINIC VISIT: HCPCS | Mod: 25

## 2018-05-17 RX ORDER — HYDROCODONE BITARTRATE AND ACETAMINOPHEN 7.5; 325 MG/1; MG/1
1 TABLET ORAL EVERY 6 HOURS PRN
Qty: 20 TABLET | Refills: 0 | Status: SHIPPED | OUTPATIENT
Start: 2018-05-17

## 2018-05-17 RX ORDER — HYDROCODONE BITARTRATE AND ACETAMINOPHEN 7.5; 325 MG/1; MG/1
1 TABLET ORAL ONCE
Status: COMPLETED | OUTPATIENT
Start: 2018-05-17 | End: 2018-05-17

## 2018-05-17 RX ADMIN — HYDROCODONE BITARTRATE AND ACETAMINOPHEN 1 TABLET: 7.5; 325 TABLET ORAL at 16:50

## 2018-05-17 ASSESSMENT — PAIN SCALES - GENERAL: PAINLEVEL: EXTREME PAIN (8)

## 2018-05-17 NOTE — MR AVS SNAPSHOT
After Visit Summary   5/17/2018    Milagros Peters    MRN: [id number]           Patient Information     Date Of Birth          12/23/1931        Visit Information        Provider Department      5/17/2018 4:15 PM Radha Easley NP [facility]        Today's Diagnoses     Fever, unspecified fever cause    -  1    Left wrist pain        Arthritis          Care Instructions      Understanding Carpometacarpal Osteoarthritis    The base of the thumb where it meets the hand is called the carpometacarpal (CMC) joint. This joint allows the thumb to move freely in many directions. It also provides strength so the hand can grasp and .  A smooth tissue called cartilage lines and cushions the bones of the CMC joint. Using the thumb puts stress on the joint. Over time, this can lead to the breakdown of the cartilage in the joint. This is known as osteoarthritis. With this condition, bones of the joint may be exposed and rub together. They may become irritated and rough. This keeps the joint from moving smoothly and can lead to pain.     How to say it  MARTELL-kevin-met--MARTELL-daniela      What causes CMC joint osteoarthritis?    This type of osteoarthritis is mainly caused by years of using the hand and thumb. The condition may be more likely if you:    Regularly do things that put great stress on the thumb joint    Have had previous thumb injuries    Have weakened or loose structures in the thumb    Are a woman who is past menopause  Symptoms of CMC joint osteoarthritis  Symptoms commonly include:    Thumb pain. It may get worse with pinching or gripping.    Thumb weakness    Sounds of grinding or popping in the thumb joint    Base of the thumb is enlarged   Treatment for CMC joint osteoarthritis  Osteoarthritis is a long-term (chronic) condition. Treatment focuses on managing symptoms. It may include:    Taking prescription or over-the-counter pain medicines to help reduce pain and swelling    Using  a brace to rest and support the thumb joint    Using hot or cold therapy to help relieve pain    Learning and practicing ways to reduce stress on the thumb joint    Using devices that help protect the joint. These include jar openers, pen , and spring-action scissors.    Following a plan of physical therapy and exercises. This will help improve the flexibility and strength of the hand and thumb.    Getting shots of medicine into the joint to help relieve symptoms for a time  If these treatments don t do enough to relieve severe pain, you may need surgery. There are several different types of surgery. In general, the goal is to relieve pain and help you to be able to use the hand.     When to call your healthcare provider  Call your healthcare provider right away if you have any of these:    Fever of 100.4 F (38 C) or higher, or as directed    Symptoms that don t get better, or get worse    New symptoms                   Follow-ups after your visit        Your next 10 appointments already scheduled     May 21, 2018  2:00 PM CDT   Antico[facility] with  ANT[facility]AG [facility]   [facility]    [facility] [address]  [place] [address]   [phone]            Jun 12, 2018 10:30 AM CDT   Return Visit with Moise Abbott MD   [facility]    [facility] [address]  [place] [address]   [phone]              Who to contact     If you have questions or need follow up information about today's clinic visit or your schedule please contact [facility] directly at [phone].  Normal or non-critical lab and imaging results will be communicated to you by MyChart, letter or phone within 4 business days after the clinic has received the results. If you do not hear from us within 7 days, please contact the clinic through MyChart or phone. If you have a critical or abnormal lab  "result, we will notify you by phone as soon as possible.  Submit refill requests through [employer] or call your pharmacy and they will forward the refill request to us. Please allow 3 business days for your refill to be completed.          Additional Information About Your Visit        [place] [employer] Information     [employer] lets you send messages to your doctor, view your test results, renew your prescriptions, schedule appointments and more. To sign up, go to [email or web address] . Click on \"Log in\" on the left side of the screen, which will take you to the Welcome page. Then click on \"Sign up Now\" on the right side of the page.     You will be asked to enter the access code listed below, as well as some personal information. Please follow the directions to create your username and password.     Your access code is: [email or web address]  Expires: 7/3/2018  9:26 AM     Your access code will  in 90 days. If you need help or a new code, please call your [facility] clinic or [phone].        Care EveryWhere ID     This is your Care EveryWhere ID. This could be used by other organizations to access your [facility] medical records  [id number]        Your Vitals Were     Pulse Temperature Breastfeeding?             87 100.2  F (37.9  C) (Tympanic) No          Blood Pressure from Last 3 Encounters:   18 140/80   18 (!) 164/103   18 128/62    Weight from Last 3 Encounters:   18 124 lb 6 oz (56.4 kg)   18 127 lb 3.2 oz (57.7 kg)   18 129 lb 3.2 oz (58.6 kg)              We Performed the Following     Basic metabolic panel     CBC and Differential     CRP inflammation     Sedimentation Rate (ESR)     Uric acid     XR Wrist Left G/E 3 Views          Today's Medication Changes          These changes are accurate as of 18  6:00 PM.  If you have any questions, ask your nurse or doctor.               Start taking these medicines.        Dose/Directions    HYDROcodone-acetaminophen 7.5-325 MG per " tablet   Commonly known as:  NORCO   Used for:  Left wrist pain, Arthritis   Started by:  Radha Easley NP        Dose:  1 tablet   Take 1 tablet by mouth every 6 hours as needed for severe pain   Quantity:  20 tablet   Refills:  0            Where to get your medicines      Some of these will need a paper prescription and others can be bought over the counter.  Ask your nurse if you have questions.     Bring a paper prescription for each of these medications     HYDROcodone-acetaminophen 7.5-325 MG per tablet               Information about OPIOIDS     PRESCRIPTION OPIOIDS: WHAT YOU NEED TO KNOW   You have a prescription for an opioid (narcotic) pain medicine. Opioids can cause addiction. If you have a history of chemical dependency of any type, you are at a higher risk of becoming addicted to opioids. Only take this medicine after all other options have been tried. Take it for as short a time and as few doses as possible.     Do not:    Drive. If you drive while taking these medicines, you could be arrested for driving under the influence (DUI).    Operate heavy machinery    Do any other dangerous activities while taking these medicines.     Drink any alcohol while taking these medicines.      Take with any other medicines that contain acetaminophen. Read all labels carefully. Look for the word  acetaminophen  or  Tylenol.  Ask your pharmacist if you have questions or are unsure.    Store your pills in a secure place, locked if possible. We will not replace any lost or stolen medicine. If you don t finish your medicine, please throw away (dispose) as directed by your pharmacist. The [place] Pollution Control Agency has more information about safe disposal: https://www.pca.[email or web address].[place].[email or web address]/living-green/managing-unwanted-medications    All opioids tend to cause constipation. Drink plenty of water and eat foods that have a lot of fiber, such as fruits, vegetables, prune juice, apple juice and high-fiber cereal. Take  a laxative (Miralax, milk of magnesia, Colace, Senna) if you don t move your bowels at least every other day.          Primary Care Provider Office Phone # Fax #    Raisa Madrid [phone] [address]   [place] [address]        Equal Access to Services     [facility] Center[facility]ATIE : Hadii aad ku hadasho Soomaali, waaxda luqadaha, qaybta kaalmada adeegyada, waxay marjorie hayaan adebryce khcalebcassidy alvarez . So [facility] [phone].    ATENCIÓN: Si habla español, tiene a singh disposición servicios gratuitos de asistencia lingüística. Llame al [phone].    We comply with applicable federal civil rights laws and [place] laws. We do not discriminate on the basis of race, color, national origin, age, disability, sex, sexual orientation, or gender identity.            Thank you!     Thank you for choosing [facility]  for your care. Our goal is always to provide you with excellent care. Hearing back from our patients is one way we can continue to improve our services. Please take a few minutes to complete the written survey that you may receive in the mail after your visit with us. Thank you!             Your Updated Medication List - Protect others around you: Learn how to safely use, store and throw away your medicines at www.disposemymeds.org.          This list is accurate as of 5/17/18  6:00 PM.  Always use your most recent med list.                   Brand Name Dispense Instructions for use Diagnosis    aspirin 81 MG EC tablet      Take 81 mg by mouth daily with food        clarithromycin 500 MG tablet    BIAXIN    28 tablet    Take 1 tablet (500 mg) by mouth 2 times daily for 14 days    Helicobacter pylori gastritis (chronic gastritis)       ferrous gluconate 324 (38 Fe) MG tablet    FERGON    30 tablet    Take 1 tablet (324 mg) by mouth daily (with breakfast)        HYDROcodone-acetaminophen 7.5-325 MG per tablet    NORCO    20 tablet    Take 1 tablet by mouth every 6  hours as needed for severe pain    Left wrist pain, Arthritis       * omeprazole 20 MG CR capsule    priLOSEC    90 capsule    Take 1 capsule (20 mg) by mouth daily    Esophagitis       * omeprazole 20 MG CR capsule    priLOSEC    42 capsule    Take 1 capsule (20 mg) by mouth 2 times daily for 21 days    Helicobacter pylori gastritis (chronic gastritis)       TENORMIN 50 MG tablet   Generic drug:  atenolol      Take 50 mg by mouth 2 times daily        * warfarin 5 MG tablet    COUMADIN    80 tablet    Take 2.5 mg daily or as directed by [facility] clinic    S/P AVR       * warfarin 2.5 MG tablet    COUMADIN    100 tablet    Take 2.5 mg daily or as directed by [facility] clinic    H/O heart valve replacement with porcine valve       * Notice:  This list has 4 medication(s) that are the same as other medications prescribed for you. Read the directions carefully, and ask your doctor or other care provider to review them with you.

## 2018-05-17 NOTE — NURSING NOTE
Patient presents with left hand swelling and pain starting last night. Patient does not recall hurting it. Area is swollen, red and warm. Jaquelin Gandara LPN .............5/17/2018  4:07 PM

## 2018-05-17 NOTE — PATIENT INSTRUCTIONS
Understanding Carpometacarpal Osteoarthritis    The base of the thumb where it meets the hand is called the carpometacarpal (CMC) joint. This joint allows the thumb to move freely in many directions. It also provides strength so the hand can grasp and .  A smooth tissue called cartilage lines and cushions the bones of the CMC joint. Using the thumb puts stress on the joint. Over time, this can lead to the breakdown of the cartilage in the joint. This is known as osteoarthritis. With this condition, bones of the joint may be exposed and rub together. They may become irritated and rough. This keeps the joint from moving smoothly and can lead to pain.     How to say it  MARTELL-po-met-uh-MARTELL-puhl      What causes CMC joint osteoarthritis?    This type of osteoarthritis is mainly caused by years of using the hand and thumb. The condition may be more likely if you:    Regularly do things that put great stress on the thumb joint    Have had previous thumb injuries    Have weakened or loose structures in the thumb    Are a woman who is past menopause  Symptoms of CMC joint osteoarthritis  Symptoms commonly include:    Thumb pain. It may get worse with pinching or gripping.    Thumb weakness    Sounds of grinding or popping in the thumb joint    Base of the thumb is enlarged   Treatment for CMC joint osteoarthritis  Osteoarthritis is a long-term (chronic) condition. Treatment focuses on managing symptoms. It may include:    Taking prescription or over-the-counter pain medicines to help reduce pain and swelling    Using a brace to rest and support the thumb joint    Using hot or cold therapy to help relieve pain    Learning and practicing ways to reduce stress on the thumb joint    Using devices that help protect the joint. These include jar openers, pen , and spring-action scissors.    Following a plan of physical therapy and exercises. This will help improve the flexibility and strength of the hand and  thumb.    Getting shots of medicine into the joint to help relieve symptoms for a time  If these treatments don t do enough to relieve severe pain, you may need surgery. There are several different types of surgery. In general, the goal is to relieve pain and help you to be able to use the hand.     When to call your healthcare provider  Call your healthcare provider right away if you have any of these:    Fever of 100.4 F (38 C) or higher, or as directed    Symptoms that don t get better, or get worse    New symptoms

## 2018-05-17 NOTE — PROGRESS NOTES
ANTICOAGULATION FOLLOW-UP CLINIC VISIT    Patient Name:  Milagros Peters  Date:  5/17/2018  Contact Type:  Face to Face    SUBJECTIVE:     Patient Findings     Positives Other complaints (c/o pain and swelling in left wrist will be going to [facility] )           OBJECTIVE    INR Protime   Date Value Ref Range Status   05/17/2018 6.3 (A) 0.86 - 1.14 Final       ASSESSMENT / PLAN  INR assessment SUPRA    Recheck INR In: 6 DAYS    INR Location Clinic      Anticoagulation Summary as of 5/17/2018     INR goal 2.5-3.5   Today's INR 6.3!   Maintenance plan 2.5 mg (2.5 mg x 1) every day   Full instructions 5/17: Hold; 5/18: Hold; 5/19: Hold; 5/20: Hold; 5/21: Hold; Otherwise 2.5 mg every day   Weekly total 17.5 mg   Plan last modified Arabella Longo, PAVEL (5/3/2018)   Next INR check 5/22/2018   Priority INR   Target end date Indefinite    Indications   H/O heart valve replacement with porcine valve [Z95.3]  Long term current use of anticoagulant therapy [Z79.01]         Anticoagulation Episode Summary     INR check location     Preferred lab     Send INR reminders to  INR    Comments       Anticoagulation Care Providers     Provider Role Specialty Phone number    Raisa Madrid MD [facility] [phone]            See the Encounter Report to view Anticoagulation Flowsheet and Dosing Calendar (Go to Encounters tab in chart review, and find the Anticoagulation Therapy Visit)        Arabella Longo, RN

## 2018-05-17 NOTE — PROGRESS NOTES
Nursing Notes:   Jaquelin Gandara LPN  5/17/2018  4:23 PM  Signed  Patient presents with left hand swelling and pain starting last night. Patient does not recall hurting it. Area is swollen, red and warm. Jaquelin Gandara LPN .............5/17/2018  4:07 PM      SUBJECTIVE:   Milagros Peters is a 86 year old female who presents to clinic today for the following health issues:    Musculoskeletal problem/pain      Duration: Started last night, Date of occurrence: 5/16/18    Description  Location: LT hand and wrist.     Intensity:  severe, 9-10/10    Accompanying signs and symptoms: radiation of pain to hand, wrist, thumb, weakness of wrist, hand and thumb, warmth, swelling and redness    History  Previous similar problem: no   Previous evaluation:  none    Precipitating or alleviating factors:  Trauma or overuse: no   Aggravating factors include: Using the hand, fingers and wrist.     Therapies tried and outcome: nothing      Problem list and histories reviewed & adjusted, as indicated.  Additional history: as documented    Current Outpatient Prescriptions   Medication Sig Dispense Refill     HYDROcodone-acetaminophen (NORCO) 7.5-325 MG per tablet Take 1 tablet by mouth every 6 hours as needed for severe pain 20 tablet 0     order for DME Equipment being ordered: Wrist brace with thumb support 1 Device 0     aspirin EC 81 MG EC tablet Take 81 mg by mouth daily with food       atenolol (TENORMIN) 50 MG tablet Take 50 mg by mouth 2 times daily       ferrous gluconate (FERGON) 324 (38 FE) MG tablet Take 1 tablet (324 mg) by mouth daily (with breakfast) 30 tablet 1     omeprazole (PRILOSEC) 20 MG CR capsule Take 1 capsule (20 mg) by mouth daily 90 capsule 3     omeprazole (PRILOSEC) 20 MG CR capsule Take 1 capsule (20 mg) by mouth 2 times daily for 21 days 42 capsule 0     warfarin (COUMADIN) 2.5 MG tablet Take 2.5 mg daily or as directed by protime clinic 100 tablet 1     warfarin (COUMADIN) 5 MG tablet Take 2.5  mg daily or [facility] 80 tablet 1     Allergies   Allergen Reactions     Neosporin [Neomycin-Polymyx-Gramicid]        Reviewed and updated as needed this visit by clinical staff  Tobacco  Allergies  Meds  Med Hx  Surg Hx  Fam Hx  Soc Hx      Reviewed and updated as needed this visit by Provider         ROS:  Documented for notable findings in the HPI.       OBJECTIVE:     /80 (BP Location: Right arm, Patient Position: Sitting, Cuff Size: Adult Regular)  Pulse 87  Temp 100.2  F (37.9  C) (Tympanic)  Breastfeeding? No  There is no height or weight on file to calculate BMI.  GENERAL: alert and moderate distress, appears to be in pain  EYES: Eyes grossly normal to inspection  HENT: normal cephalic/atraumatic and oral mucous membranes moist  NECK: no adenopathy  RESP: With ease, non labored  CV: regular rates and rhythm and peripheral pulses strong  SKIN: no suspicious lesions or rashes  EXTREMITIES: Tenderness on the distal radius/ulna.  Some swelling and Redness, No echymosis. Decreased ROM. Patient does not want the wrist touched and refuses to do much for ROM.   NEURO: Normal strength and tone, mentation intact and speech normal  PSYCH: mentation appears normal, affect normal/bright    Diagnostic Test Results:  Results for orders placed or performed in visit on 05/17/18 (from the past 24 hour(s))   CBC and Differential   Result Value Ref Range    WBC 12.5 (H) 4.0 - 11.0 10e9/L    RBC Count 4.05 3.8 - 5.2 10e12/L    Hemoglobin 8.0 (L) 11.7 - 15.7 g/dL    Hematocrit 28.2 (L) 35.0 - 47.0 %    MCV 70 (L) 78 - 100 fl    MCH 19.8 (L) 26.5 - 33.0 pg    MCHC 28.4 (L) 31.5 - 36.5 g/dL    RDW 18.8 (H) 10.0 - 15.0 %    Platelet Count 373 150 - 450 10e9/L    Diff Method Automated Method     % Neutrophils 83.9 %    % Lymphocytes 5.7 %    % Monocytes 9.7 %    % Eosinophils 0.2 %    % Basophils 0.2 %    % Immature Granulocytes 0.3 %    Absolute Neutrophil 10.5 (H) 1.6 - 8.3 10e9/L    Absolute  Lymphocytes 0.7 (L) 0.8 - 5.3 10e9/L    Absolute Monocytes 1.2 0.0 - 1.3 10e9/L    Absolute Eosinophils 0.0 0.0 - 0.7 10e9/L    Absolute Basophils 0.0 0.0 - 0.2 10e9/L    Abs Immature Granulocytes 0.0 0 - 0.4 10e9/L   Sedimentation Rate (ESR)   Result Value Ref Range    Sed Rate 99 (H) 1 - 15 mm/h   CRP inflammation   Result Value Ref Range    CRP Inflammation 15.9 (H) <0.5 mg/L   Basic metabolic panel   Result Value Ref Range    Sodium 137 134 - 144 mmol/L    Potassium 4.2 3.5 - 5.1 mmol/L    Chloride 99 98 - 107 mmol/L    Carbon Dioxide 30 21 - 31 mmol/L    Anion Gap 8 3 - 14 mmol/L    Glucose 112 (H) 70 - 105 mg/dL    Urea Nitrogen 21 7 - 25 mg/dL    Creatinine 0.85 0.60 - 1.20 mg/dL    GFR Estimate 63 >60 mL/min/1.7m2    GFR Estimate If Black 77 >60 mL/min/1.7m2    Calcium 9.4 8.6 - 10.3 mg/dL   Uric acid   Result Value Ref Range    Uric Acid 7.1 4.4 - 7.6 mg/dL   XR Wrist Left G/E 3 Views    Narrative    PROCEDURE:  XR WRIST LEFT G/E 3 VIEWS    HISTORY: Sudden swelling, redness and extreme pain in LT wrist.;  Fever, unspecified fever cause; Left wrist pain    COMPARISON:  None.    TECHNIQUE:  3 views of the left wrist were obtained.    FINDINGS:  There are severe degenerative changes at the trapezium  first metacarpal articulation. There is chondrocalcinosis seen at the  wrist particularly in the triangular fibrocartilage. There is a small  accessory ossicle  old ununited fracture at the tip of the radial  styloid.       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  I personally reviewed the xray. There was severe DJD of the MCP joint and wrist upon initial read of xray.  Final read pending by radiology.    ASSESSMENT/PLAN:     1. Arthralgia of metacarpophalangeal joint, left  - HYDROcodone-acetaminophen (NORCO) 7.5-325 MG per tablet; Take 1 tablet by mouth every 6 hours as needed for severe pain   Dispense: 20 tablet; Refill: 0  - order for DME; Equipment being ordered: Wrist brace with thumb support  Dispense: 1 Device; Refill: 0    2. Left wrist pain  - CBC and Differential  - Sedimentation Rate (ESR)  - CRP inflammation  - Basic metabolic panel  - XR Wrist Left G/E 3 Views  - Uric acid  - HYDROcodone-acetaminophen (NORCO) 7.5-325 MG per tablet; Take 1 tablet by mouth every 6 hours as needed for severe pain  Dispense: 20 tablet; Refill: 0  - order for DME; Equipment being ordered: Wrist brace with thumb support  Dispense: 1 Device; Refill: 0    3. Fever, unspecified fever cause  - CBC and Differential  - Sedimentation Rate (ESR)  - CRP inflammation  - Basic metabolic panel  - XR Wrist Left G/E 3 Views    Medical Decision Making:    Differential Diagnosis:  MS Injury Pain: gout, septic arthritis, osteoarthritis and rheumatoid arthritis    Serious Comorbid Conditions:  Adult:  Elderly    PLAN:    MS Injury/Pain  ice, heat, elevate, rest, splint: Wrist splint, Rx: Norco 7.5/325 since this helped in the office today and F/u Early next week.     1745: Call placed to Ortho on call. [place] this to be gout or bad arthritis flare up. Could even be a small bleed into the joint. Treat with above meds. Hard to treat inflammation since she is on Coumadin and treated for active H pylori and gastritis. Unable to use Prednisone or NSAIDs. Not likely to be septic arthritis given current abx and labs.     Followup:    If not improving or if conditions worsens over the next 12-24 hours, go to the Emergency Department, In 4  day(s) follow up with  your Primary Care Provider      Disclaimer:  This note consists of words and symbols derived from keyboarding, dictation, or using voice recognition software. As a result, there may be errors in the script that have gone undetected. Please consider this when interpreting information found in this note.    Radha Easlye NP, 5/17/2018 4:06 PM

## 2018-05-17 NOTE — MR AVS SNAPSHOT
Milagros Peters   5/17/2018 4:00 PM   Anticoagulation Therapy Visit    Description:  86 year old female   Provider:  [facility] 1   Department:  [facility]           INR as of 5/17/2018     Today's INR 6.3!      Anticoagulation Summary as of 5/17/2018     INR goal 2.5-3.5   Today's INR 6.3!   Full instructions 5/17: Hold; 5/18: Hold; 5/19: Hold; 5/20: Hold; 5/21: Hold; Otherwise 2.5 mg every day   Next INR check 5/22/2018    Indications   H/O heart valve replacement with porcine valve [Z95.3]  Long term current use of anticoagulant therapy [Z79.01]         Description     Hold dose through weekend and recheck on 5/22. Arabella Longo RN    5/17/2018  3:31 PM        Your next Anticoagulation Clinic appointment(s)     May 17, 2018  4:00 PM CDT   Anticoagulation Visit with [facility] 1   [facility] ([facility])    [address]   [facility] [address]   [phone]              May 2018 Details    Sun Mon Tue Wed Thu Fri Sat       1               2               3               4               5                 6               7               8               9               10               11               12                 13               14               15               16               17      Hold   See details      18      Hold         19      Hold           20      Hold         21      Hold         22            23               24               25               26                 27               28               29               30               31                  Date Details   05/17 This INR check       Date of next INR:  5/22/2018         How to take your warfarin dose     To take:  2.5 mg Take 1 of the 2.5 mg tablets.    Hold Do not take your warfarin dose. See the Details table to the right for additional instructions.

## 2018-05-21 ENCOUNTER — ANTICOAGULATION THERAPY VISIT (OUTPATIENT)
Dept: ANTICOAGULATION | Facility: OTHER | Age: 83
End: 2018-05-21
Attending: FAMILY MEDICINE
Payer: MEDICARE

## 2018-05-21 DIAGNOSIS — Z79.01 LONG TERM CURRENT USE OF ANTICOAGULANT THERAPY: ICD-10-CM

## 2018-05-21 DIAGNOSIS — Z95.3 H/O HEART VALVE REPLACEMENT WITH PORCINE VALVE: ICD-10-CM

## 2018-05-21 LAB — INR POINT OF CARE: 2.1 (ref 0.86–1.14)

## 2018-05-21 PROCEDURE — 99207 ZZC NO CHARGE NURSE ONLY: CPT

## 2018-05-21 PROCEDURE — 85610 PROTHROMBIN TIME: CPT | Mod: QW,ZL

## 2018-05-21 NOTE — MR AVS SNAPSHOT
Milagros Peters   5/21/2018 2:00 PM   Anticoagulation Therapy Visit    Description:  86 year old female   Provider:  PAT[facility] ANTI COAKIMBERLY 1   Department:  [facility]           INR as of 5/21/2018     Today's INR 2.1!      Anticoagulation Summary as of 5/21/2018     INR goal 2.5-3.5   Today's INR 2.1!   Full instructions 5/21: 2.5 mg; Otherwise 1.25 mg on Mon, Wed, Fri; 2.5 mg all other days   Next INR check 5/25/2018    Indications   H/O heart valve replacement with porcine valve [Z95.3]  Long term current use of anticoagulant therapy [Z79.01]         Description     Take 2.5 mg x 2 days and 1.25 mg x 2 days and recheck on 5/25/18. Arabella Longo RN    5/21/2018  1:15 PM        Your next Anticoagulation Clinic appointment(s)     May 21, 2018  2:00 PM CDT   Anticoagulation Visit with [facility] COAG 1   [facility] ([facility])    [address]  [facility] [address]   [phone]              May 2018 Details    Sun Mon Tue Wed Thu Fri Sat       1               2               3               4               5                 6               7               8               9               10               11               12                 13               14               15               16               17               18               19                 20               21      2.5 mg   See details      22      2.5 mg         23      1.25 mg         24      2.5 mg         25            26                 27               28               29               30               31                  Date Details   05/21 This INR check       Date of next INR:  5/25/2018         How to take your warfarin dose     To take:  1.25 mg Take 0.5 of a 2.5 mg tablet.    To take:  2.5 mg Take 1 of the 2.5 mg tablets.

## 2018-05-21 NOTE — PROGRESS NOTES
ANTICOAGULATION FOLLOW-UP CLINIC VISIT    Patient Name:  Milagros Peters  Date:  5/21/2018  Contact Type:  Face to Face    SUBJECTIVE:     Patient Findings     Positives Antibiotic use or infection (continues with antibiotic and omeprazole)           OBJECTIVE    INR Protime   Date Value Ref Range Status   05/21/2018 2.1 (A) 0.86 - 1.14 Final       ASSESSMENT / PLAN  INR assessment THER    Recheck INR In: 4 DAYS    INR Location Clinic      Anticoagulation Summary as of 5/21/2018     INR goal 2.5-3.5   Today's INR 2.1!   Maintenance plan 1.25 mg (2.5 mg x 0.5) on Mon, Wed, Fri; 2.5 mg (2.5 mg x 1) all other days   Full instructions 5/21: 2.5 mg; Otherwise 1.25 mg on Mon, Wed, Fri; 2.5 mg all other days   Weekly total 13.75 mg   Plan last modified Arabella Longo, RN (5/21/2018)   Next INR check 5/25/2018   Priority INR   Target end date Indefinite    Indications   H/O heart valve replacement with porcine valve [Z95.3]  Long term current use of anticoagulant therapy [Z79.01]         Anticoagulation Episode Summary     INR check location     Preferred lab     Send INR reminders to  INR    Comments       Anticoagulation Care Providers     Provider Role Specialty Phone number    Raisa Madrid MD [facility] [phone]            See the Encounter Report to view Anticoagulation Flowsheet and Dosing Calendar (Go to Encounters tab in chart review, and find the Anticoagulation Therapy Visit)        Arabella Longo, RN

## 2018-05-24 ENCOUNTER — APPOINTMENT (OUTPATIENT)
Dept: GENERAL RADIOLOGY | Facility: OTHER | Age: 83
End: 2018-05-24
Attending: FAMILY MEDICINE
Payer: MEDICARE

## 2018-05-24 ENCOUNTER — HOSPITAL ENCOUNTER (EMERGENCY)
Facility: OTHER | Age: 83
Discharge: HOME | End: 2018-05-24
Attending: FAMILY MEDICINE | Admitting: FAMILY MEDICINE
Payer: MEDICARE

## 2018-05-24 ENCOUNTER — APPOINTMENT (OUTPATIENT)
Dept: CT IMAGING | Facility: OTHER | Age: 83
End: 2018-05-24
Attending: FAMILY MEDICINE
Payer: MEDICARE

## 2018-05-24 ENCOUNTER — ANTICOAGULATION THERAPY VISIT (OUTPATIENT)
Dept: ANTICOAGULATION | Facility: OTHER | Age: 83
End: 2018-05-24

## 2018-05-24 VITALS
DIASTOLIC BLOOD PRESSURE: 89 MMHG | RESPIRATION RATE: 16 BRPM | OXYGEN SATURATION: 96 % | TEMPERATURE: 98.6 F | SYSTOLIC BLOOD PRESSURE: 145 MMHG | HEIGHT: 62 IN

## 2018-05-24 DIAGNOSIS — S30.0XXA CONTUSION OF PELVIS, INITIAL ENCOUNTER: ICD-10-CM

## 2018-05-24 DIAGNOSIS — H54.40 PROFOUND IMPAIRMENT, ONE EYE, IMPAIRMENT LEVEL NOT FURTHER SPECIFIED: ICD-10-CM

## 2018-05-24 DIAGNOSIS — R41.3 MEMORY LOSS: ICD-10-CM

## 2018-05-24 DIAGNOSIS — Z79.01 LONG TERM CURRENT USE OF ANTICOAGULANT THERAPY: ICD-10-CM

## 2018-05-24 DIAGNOSIS — S00.03XA CONTUSION OF OCCIPITAL REGION OF SCALP, INITIAL ENCOUNTER: ICD-10-CM

## 2018-05-24 DIAGNOSIS — D50.0 IRON DEFICIENCY ANEMIA DUE TO CHRONIC BLOOD LOSS: ICD-10-CM

## 2018-05-24 DIAGNOSIS — Z95.2 S/P AVR: ICD-10-CM
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
## 2018-05-24 DIAGNOSIS — S16.1XXA STRAIN OF NECK MUSCLE, INITIAL ENCOUNTER: ICD-10-CM

## 2018-05-24 DIAGNOSIS — Z95.3 H/O HEART VALVE REPLACEMENT WITH PORCINE VALVE: ICD-10-CM

## 2018-05-24 LAB
ANION GAP SERPL CALCULATED.3IONS-SCNC: 10 MMOL/L (ref 3–14)
BASOPHILS # BLD AUTO: 0 10E9/L (ref 0–0.2)
BASOPHILS NFR BLD AUTO: 0.3 %
BUN SERPL-MCNC: 16 MG/DL (ref 7–25)
CALCIUM SERPL-MCNC: 9.4 MG/DL (ref 8.6–10.3)
CHLORIDE SERPL-SCNC: 97 MMOL/L (ref 98–107)
CO2 SERPL-SCNC: 30 MMOL/L (ref 21–31)
CREAT SERPL-MCNC: 0.83 MG/DL (ref 0.6–1.2)
DIFFERENTIAL METHOD BLD: ABNORMAL
EOSINOPHIL # BLD AUTO: 0.1 10E9/L (ref 0–0.7)
EOSINOPHIL NFR BLD AUTO: 0.8 %
ERYTHROCYTE [DISTWIDTH] IN BLOOD BY AUTOMATED COUNT: 19.1 % (ref 10–15)
GFR SERPL CREATININE-BSD FRML MDRD: 65 ML/MIN/1.7M2
GLUCOSE SERPL-MCNC: 96 MG/DL (ref 70–105)
HCT VFR BLD AUTO: 27.7 % (ref 35–47)
HGB BLD-MCNC: 7.9 G/DL (ref 11.7–15.7)
IMM GRANULOCYTES # BLD: 0.1 10E9/L (ref 0–0.4)
IMM GRANULOCYTES NFR BLD: 0.9 %
INR PPP: 2.52 (ref 0–1.3)
LYMPHOCYTES # BLD AUTO: 0.5 10E9/L (ref 0.8–5.3)
LYMPHOCYTES NFR BLD AUTO: 4.4 %
MCH RBC QN AUTO: 20.1 PG (ref 26.5–33)
MCHC RBC AUTO-ENTMCNC: 28.5 G/DL (ref 31.5–36.5)
MCV RBC AUTO: 71 FL (ref 78–100)
MONOCYTES # BLD AUTO: 1.2 10E9/L (ref 0–1.3)
MONOCYTES NFR BLD AUTO: 11.7 %
NEUTROPHILS # BLD AUTO: 8.7 10E9/L (ref 1.6–8.3)
NEUTROPHILS NFR BLD AUTO: 81.9 %
PLATELET # BLD AUTO: 383 10E9/L (ref 150–450)
POTASSIUM SERPL-SCNC: 4 MMOL/L (ref 3.5–5.1)
RBC # BLD AUTO: 3.93 10E12/L (ref 3.8–5.2)
SODIUM SERPL-SCNC: 137 MMOL/L (ref 134–144)
WBC # BLD AUTO: 10.6 10E9/L (ref 4–11)

## 2018-05-24 PROCEDURE — 72170 X-RAY EXAM OF PELVIS: CPT | Mod: TC

## 2018-05-24 PROCEDURE — 70450 CT HEAD/BRAIN W/O DYE: CPT | Mod: TC

## 2018-05-24 PROCEDURE — 25000132 ZZH RX MED GY IP 250 OP 250 PS 637: Mod: GY | Performed by: FAMILY MEDICINE

## 2018-05-24 PROCEDURE — 72125 CT NECK SPINE W/O DYE: CPT | Mod: TC

## 2018-05-24 PROCEDURE — 99285 EMERGENCY DEPT VISIT HI MDM: CPT | Mod: 25 | Performed by: FAMILY MEDICINE

## 2018-05-24 PROCEDURE — 80048 BASIC METABOLIC PNL TOTAL CA: CPT | Performed by: FAMILY MEDICINE

## 2018-05-24 PROCEDURE — 85025 COMPLETE CBC W/AUTO DIFF WBC: CPT | Performed by: FAMILY MEDICINE

## 2018-05-24 PROCEDURE — 36415 COLL VENOUS BLD VENIPUNCTURE: CPT | Performed by: FAMILY MEDICINE

## 2018-05-24 PROCEDURE — 85610 PROTHROMBIN TIME: CPT | Performed by: FAMILY MEDICINE

## 2018-05-24 PROCEDURE — A9270 NON-COVERED ITEM OR SERVICE: HCPCS | Mod: GY | Performed by: FAMILY MEDICINE

## 2018-05-24 PROCEDURE — 99285 EMERGENCY DEPT VISIT HI MDM: CPT | Mod: Z6 | Performed by: FAMILY MEDICINE

## 2018-05-24 RX ORDER — VITAMIN A, VITAMIN C, VITAMIN D-3, VITAMIN E, VITAMIN B-1, VITAMIN B-2, NIACIN, VITAMIN B-6, CALCIUM, IRON, ZINC, COPPER 4000; 120; 400; 22; 1.84; 3; 20; 10; 1; 12; 200; 27; 25; 2 [IU]/1; MG/1; [IU]/1; MG/1; MG/1; MG/1; MG/1; MG/1; MG/1; UG/1; MG/1; MG/1; MG/1; MG/1
1 TABLET ORAL DAILY
Qty: 100 TABLET | Refills: 0 | Status: SHIPPED | OUTPATIENT
Start: 2018-05-24

## 2018-05-24 RX ORDER — ACETAMINOPHEN 325 MG/1
650 TABLET ORAL ONCE
Status: COMPLETED | OUTPATIENT
Start: 2018-05-24 | End: 2018-05-24

## 2018-05-24 RX ADMIN — ACETAMINOPHEN 650 MG: 325 TABLET, FILM COATED ORAL at 05:25

## 2018-05-24 ASSESSMENT — ENCOUNTER SYMPTOMS
LIGHT-HEADEDNESS: 1
CHILLS: 0
RESPIRATORY NEGATIVE: 1
CONFUSION: 1
NECK PAIN: 1
DIAPHORESIS: 0
FEVER: 0
FATIGUE: 1
DYSURIA: 0
CARDIOVASCULAR NEGATIVE: 1
GASTROINTESTINAL NEGATIVE: 1
SHORTNESS OF BREATH: 0

## 2018-05-24 NOTE — DISCHARGE INSTRUCTIONS
Dear Ms. Peters,  It was nice to see you.  As we talked your fall caused some strain and contusions but no broken bones or bleeding internally.    Your INR this morning at 5AM was 2.52.  Call the coumadin clinic this morning to review your dose.    Your Hemoglobin continues to be low at 7.9.  We can try a daily prenatal vitamin with low iron to see if it helps you feel better and gain some Hemoglobin back.    I understand your goal for moving back home and trying to maintain as much independence as possible but you are at a stage in life in which you need help to be independent.  Your desires need to be balanced by the help that you will need to be both as independent as possible as well as safe.  This will require a detailed conversation and plan with your family.    If you are unable to travel we can have home care review your needs and discuss Nursing Home placement as well.    Dr. Miguel Angel Abbott

## 2018-05-24 NOTE — ED PROVIDER NOTES
History     Chief Complaint   Patient presents with     Fall     The history is provided by the spouse and the patient.     Milagros Peters is a 86 year old female who lives with her daughter.  She suffers from dementia and has been falling more recently.  She has Porcine AVR on coumadin and aspirin.  She had refused to go to her bed last night and was sleeping first on the couch and then in a chair with swelling in her legs.  She got up out of the chair and lost her balance and fell backward through a doorway striking the back of her head.  Her daughter heard her fall and attended her immediately.  There was no LOC or scalp laceration.  She c/o neck and occiput pain and tailbone pain.    Problem List:    Patient Active Problem List    Diagnosis Date Noted     Iron deficiency anemia due to chronic blood loss 04/06/2018     Priority: Medium     Memory loss 04/06/2018     Priority: Medium     Profound impairment, one eye, impairment level not further specified 02/20/2018     Priority: Medium     Overview:   after valve surgery, attributed to blood clot       H/O heart valve replacement with porcine valve 02/11/2018     Priority: Medium     Long term current use of anticoagulant therapy 02/11/2018     Priority: Medium     Anticoagulation monitoring, INR range 2-3 06/14/2017     Priority: Medium     DNR no code (do not resuscitate) 06/14/2017     Priority: Medium     S/P AVR 06/14/2017     Priority: Medium     Heart valve replaced 09/01/2010     Priority: Medium     Hyperlipidemia 09/01/2010     Priority: Medium     Hypertension 09/01/2010     Priority: Medium        Past Medical History:    Past Medical History:   Diagnosis Date     Essential (primary) hypertension      Hyperlipidemia      Nonrheumatic aortic valve disorder      Osteoarthritis      Pneumonia        Past Surgical History:    Past Surgical History:   Procedure Laterality Date     APPENDECTOMY OPEN      childhood     ESOPHAGOSCOPY, GASTROSCOPY,  "DUODENOSCOPY (EGD), COMBINED N/A 4/30/2018    Procedure: COMBINED ESOPHAGOSCOPY, GASTROSCOPY, DUODENOSCOPY (EGD), BIOPSY SINGLE OR MULTIPLE;  Gastroscopy w/ Biopsy;  Surgeon: Moise Abbott MD;  Location: [facility]     HYSTERECTOMY TOTAL ABDOMINAL      1994,kept ovaries     OTHER SURGICAL HISTORY      2003,,AORTIC VALVE REPLACEMENT     TONSILLECTOMY      childhood       Family History:    Family History   Problem Relation Age of Onset     CANCER Mother      Cancer     Asthma Mother      Asthma     HEART DISEASE Father      Heart Disease,heart problems       Social History:  Marital Status:   [5]  Social History   Substance Use Topics     Smoking status: Never Smoker     Smokeless tobacco: Never Used     Alcohol use No        Medications:      aspirin EC 81 MG EC tablet   atenolol (TENORMIN) 50 MG tablet   omeprazole (PRILOSEC) 20 MG CR capsule   Prenatal Vit-Fe Fumarate-FA (PRENATAL VITAMIN PLUS LOW IRON) 27-1 MG TABS   HYDROcodone-acetaminophen (NORCO) 7.5-325 MG per tablet   omeprazole (PRILOSEC) 20 MG CR capsule   order for DME   warfarin (COUMADIN) 2.5 MG tablet   warfarin (COUMADIN) 5 MG tablet         Review of Systems   Constitutional: Positive for fatigue. Negative for chills, diaphoresis and fever.   HENT: Negative.    Eyes: Positive for visual disturbance (Single eye vision.).   Respiratory: Negative.  Negative for shortness of breath.    Cardiovascular: Negative.  Negative for chest pain.   Gastrointestinal: Negative.    Genitourinary: Negative.  Negative for dysuria.   Musculoskeletal: Positive for gait problem and neck pain.   Skin: Negative.    Neurological: Positive for light-headedness (Postural hypotension.).   Psychiatric/Behavioral: Positive for confusion.        Memory loss.       Physical Exam   BP: 194/76  Heart Rate: 91  Temp: 99.4  F (37.4  C)  Resp: 18  Height: 157.5 cm (5' 2\")  SpO2: 98 %      Physical Exam   Constitutional: She appears well-developed and well-nourished. She " appears distressed.   HENT:   Head: Normocephalic.   Right Ear: External ear normal.   Left Ear: External ear normal.   Nose: Nose normal.   Mouth/Throat: Oropharynx is clear and moist.   Eyes: Conjunctivae and EOM are normal. Pupils are equal, round, and reactive to light. No scleral icterus.   Bilateral arcus senilis   Neck: Normal range of motion. Neck supple. No tracheal deviation present. Thyromegaly present.   Cardiovascular: Normal rate and regular rhythm.    Pulmonary/Chest: Effort normal and breath sounds normal. No respiratory distress.   Abdominal: Soft. Bowel sounds are normal. There is no tenderness.   Musculoskeletal: Normal range of motion. She exhibits no edema.   Lymphadenopathy:     She has no cervical adenopathy.   Neurological: She is alert. She exhibits normal muscle tone.   Skin: Skin is warm and dry. There is pallor.   Psychiatric:   Confused, memory loss, repetitive questions.   Nursing note and vitals reviewed.      ED Course     ED Course     Procedures               Critical Care time:  none               Results for orders placed or performed during the hospital encounter of 05/24/18 (from the past 24 hour(s))   CT Head w/o Contrast    Narrative    EXAM:    CT Head Without Intravenous Contrast    CLINICAL HISTORY:    86 years old, female; Injury or trauma; Fall; Initial encounter; Blunt trauma   (contusions or hematomas); Additional info: Fall onto back of head. On coumadin   and aspirin.    TECHNIQUE:    Axial computed tomography images of the head/brain without intravenous   contrast.  All CT scans at this facility use one or more dose reduction   techniques, viz.: automated exposure control; ma/kV adjustment per patient size   (including targeted exams where dose is matched to indication; i.e. head); or   iterative reconstruction technique.    Coronal and sagittal reformatted images were created and reviewed.    COMPARISON:    No relevant prior studies available.    FINDINGS:    Brain:   No evidence of acute intracranial hemorrhage. No intracranial mass or   mass effect. Cerebral cortical volume loss. Patchy areas of diminished density   in the subcortical and periventricular white matter consistent with   microvascular leukoencephalopathy.  Focal hypodensity located in the right   cerebellar hemisphere consistent with an old infarct.    Ventricles:  No obstructive hydrocephalus.    Bones/joints:  Unremarkable.  No acute fracture.    Soft tissues:  Unremarkable.    Vasculature:  Vascular calcification.    Sinuses:  Unremarkable as visualized.  No acute sinusitis.    Mastoid air cells:  Unremarkable as visualized.  No mastoid effusion.      Impression    IMPRESSION:         1. No evidence of acute intracranial hemorrhage. No intracranial mass or   obstructive hydrocephalus.    2. 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    3. Focal area of cystic encephalomalacia involving central portion of the   right cerebellar hemisphere consistent with an old infarct.    THIS DOCUMENT HAS BEEN ELECTRONICALLY SIGNED BY LOUIS WYNN MD   CT Cervical Spine w/o Contrast    Narrative    EXAM:    CT Cervical Spine Without Intravenous Contrast    CLINICAL HISTORY:    86 years old, female; Injury or trauma; Fall; Initial encounter; Blunt   trauma; Additional info: Neck pain    TECHNIQUE:    Axial computed tomography images of the cervical spine without intravenous   contrast.  All CT scans at this facility use one or more dose reduction   techniques, viz.: automated exposure control; ma/kV adjustment per patient size   (including targeted exams where dose is matched to indication; i.e. head); or   iterative reconstruction technique.    Coronal and sagittal reformatted images were created and reviewed.    COMPARISON:    No relevant prior studies available.    FINDINGS:    Vertebrae:  No evidence of a fracture involving the cervical vertebral bodies   or posterior elements.     Discs/spinal canal/neural foramina:  Multilevel degenerative cervical disc   disease and facet disease. No central canal stenosis. Variable degrees of   neural foraminal narrowing secondary degenerative changes of the uncovertebral   joints and facet joints.    Soft tissues:  Unremarkable.    Vasculature:  Bilateral carotid bifurcation calcification.    Lung apices:  Unremarkable as visualized.      Impression    IMPRESSION:         1. No evidence of a fracture involving the cervical vertebral bodies or   posterior elements.    2. Multilevel degenerative cervical disc disease and facet disease. No   central canal stenosis. Variable degrees of neural foraminal narrowing   secondary degenerative changes of the uncovertebral joints and facet joints.    THIS DOCUMENT HAS BEEN ELECTRONICALLY SIGNED BY LOUIS WYNN MD   XR Pelvis 1/2 Views    Narrative    EXAM:    XR Pelvis, 1 or 2 Views    CLINICAL HISTORY:    86 years old, female; Injury or trauma; Fall; Initial encounter; Blunt trauma   (contusions or hematomas); Does not apply; Pelvic region; Additional info: Fall   backward C/O tailbone pain.    TECHNIQUE:    Frontal view of the pelvis.    COMPARISON:    No relevant prior studies available.    FINDINGS:    Bones/joints:  Unremarkable.  No acute fracture.  No dislocation.    Soft tissues:  Unremarkable.    Vasculature:  Vascular calcification.      Impression    IMPRESSION:         No acute findings.    THIS DOCUMENT HAS BEEN ELECTRONICALLY SIGNED BY LOUIS WYNN MD   INR   Result Value Ref Range    INR 2.52 (H) 0 - 1.3   CBC with platelets differential   Result Value Ref Range    WBC 10.6 4.0 - 11.0 10e9/L    RBC Count 3.93 3.8 - 5.2 10e12/L    Hemoglobin 7.9 (L) 11.7 - 15.7 g/dL    Hematocrit 27.7 (L) 35.0 - 47.0 %    MCV 71 (L) 78 - 100 fl    MCH 20.1 (L) 26.5 - 33.0 pg    MCHC 28.5 (L) 31.5 - 36.5 g/dL    RDW 19.1 (H) 10.0 - 15.0 %    Platelet Count 383 150 - 450 10e9/L    Diff Method Automated Method     %  Neutrophils 81.9 %    % Lymphocytes 4.4 %    % Monocytes 11.7 %    % Eosinophils 0.8 %    % Basophils 0.3 %    % Immature Granulocytes 0.9 %    Absolute Neutrophil 8.7 (H) 1.6 - 8.3 10e9/L    Absolute Lymphocytes 0.5 (L) 0.8 - 5.3 10e9/L    Absolute Monocytes 1.2 0.0 - 1.3 10e9/L    Absolute Eosinophils 0.1 0.0 - 0.7 10e9/L    Absolute Basophils 0.0 0.0 - 0.2 10e9/L    Abs Immature Granulocytes 0.1 0 - 0.4 10e9/L   Basic metabolic panel   Result Value Ref Range    Sodium 137 134 - 144 mmol/L    Potassium 4.0 3.5 - 5.1 mmol/L    Chloride 97 (L) 98 - 107 mmol/L    Carbon Dioxide 30 21 - 31 mmol/L    Anion Gap 10 3 - 14 mmol/L    Glucose 96 70 - 105 mg/dL    Urea Nitrogen 16 7 - 25 mg/dL    Creatinine 0.83 0.60 - 1.20 mg/dL    GFR Estimate 65 >60 mL/min/1.7m2    GFR Estimate If Black 79 >60 mL/min/1.7m2    Calcium 9.4 8.6 - 10.3 mg/dL       Medications   acetaminophen (TYLENOL) tablet 650 mg (650 mg Oral Given 5/24/18 0525)       5:21 AM I updated patient and her daughter with her CT and plain film x-ray reports showing no evidence of acute fracture or intracranial bleed.    6:28 AM long discussion with patient and her daughter on 2 occasions now regarding patient's desire to move back to [place].  Reviewed her labs that show continued slow decline in her hemoglobin with microcytic MCV consistent with iron like anemia not improved since beginning treatment for Helicobacter pylori.  She had done okay on the iron pills recently but there is concern that he could be causing some of her stomach upset and constipation symptoms.  I propose trying a prenatal vitamin with low iron and Rx for #100 is [facility] to [facility].  We reviewed her INR of 2.52 and recommending daughter and patient discuss this value with the Coumadin clinic this morning rather than going back in for a repeat draw.  Patient has strong desire for independence however the reality is her mobility and energy is significantly impaired by her multiple  medical problems and her declining hemoglobin.  We discussed that for her to maintain independence will require a great deal of help from others.  Safety is the family's greatest concern and will also require help and it will be important to balance these concerns.  Patient exhibiting significant memory impairment and repeating her questions multiple times during each conversation.    Assessments & Plan (with Medical Decision Making)   86-year-old female with chronic worsening microcytic hypochromic anemia status post recent endoscopy colonoscopy without obvious bleeding source but finding of Helicobacter pylori prompting treatment.  However she continues with slow decline in her hemoglobin.  She had postural weakness with fall this morning after getting out of chair striking her occiput and tailbone with occipital and pelvic contusion, and cervical muscle strain.  She is on Coumadin for porcine aortic valve replacement.  She had some difficulty with iron pills in the past and will try a prenatal vitamin with low iron to see if we can improve her hemoglobin a bit.  She has memory impairment as well as multiple medical problems but strongly desires a move back to [facility] in hopes for in an independent living situation in an apartment.  We discussed that her desires are not realistic and less she has a lot of help and that family will want to ensure her safety.  I have encouraged him to have a conversation to balance concerns for safety with independence.      I have reviewed the nursing notes.    I have reviewed the findings, diagnosis, plan and need for follow up with the patient.       Discharge Medication List as of 5/24/2018  6:17 AM      START taking these medications    Details   Prenatal Vit-Fe Fumarate-FA (PRENATAL VITAMIN PLUS LOW IRON) 27-1 MG TABS Take 1 tablet by mouth daily, Disp-100 tablet, R-0, E-Prescribe             Final diagnoses:   Fall from standing, initial encounter   Contusion of  occipital region of scalp, initial encounter   Contusion of pelvis, initial encounter   Strain of neck muscle, initial encounter   Memory loss   Profound impairment, one eye, impairment level not further specified   Iron deficiency anemia due to chronic blood loss   S/P AVR - porcine valve       5/24/2018   [facility] Clinic[facility]rem[facility] stoddard MD  05/24/18 4818

## 2018-05-24 NOTE — PROGRESS NOTES
ANTICOAGULATION FOLLOW-UP CLINIC VISIT    Patient Name:  Milagros Peters  Date:  5/24/2018  Contact Type:  Telephone spoke with daughter for dosing after a hospital visit INR    SUBJECTIVE:     Patient Findings     Positives Other complaints (was in the ED today for a fall)           OBJECTIVE    INR   Date Value Ref Range Status   05/24/2018 2.52 (H) 0 - 1.3 Final       ASSESSMENT / PLAN  INR assessment THER    Recheck INR In: 1 WEEK    INR Location Clinic      Anticoagulation Summary as of 5/24/2018     INR goal 2.5-3.5   Today's INR 2.52   Warfarin maintenance plan 1.25 mg (2.5 mg x 0.5) on Mon, Wed, Fri; 2.5 mg (2.5 mg x 1) all other days   Full warfarin instructions 1.25 mg on Mon, Wed, Fri; 2.5 mg all other days   Weekly warfarin total 13.75 mg   No change documented Arabella Longo RN   Plan last modified Arabella Longo RN (5/21/2018)   Next INR check 5/30/2018   Priority INR   Target end date Indefinite    Indications   H/O heart valve replacement with porcine valve [Z95.3]  Long term current use of anticoagulant therapy [Z79.01]         Anticoagulation Episode Summary     INR check location     Preferred lab     Send INR reminders to  INR    Comments       Anticoagulation Care Providers     Provider Role Specialty Phone number    Raisa Madrid MD [facility] [phone]            See the Encounter Report to view Anticoagulation Flowsheet and Dosing Calendar (Go to Encounters tab in chart review, and find the Anticoagulation Therapy Visit)        Arabella Longo RN

## 2018-05-24 NOTE — ED TRIAGE NOTES
Pt comes to the ER after falling this morning. Pt fell backwards in her kitchen and hit her head. Pt did not loose consciousness. No open wounds. Pt is alert and orientated. Some neck pain with shrugging shoulders. Daughter is with pt.

## 2018-05-24 NOTE — MR AVS SNAPSHOT
Milagros Peters   5/24/2018   Anticoagulation Therapy Visit    Description:  86 year old female   Provider:  Raisa Madrid MD   Department:   Anticoag           INR as of 5/24/2018     Today's INR 2.52      Anticoagulation Summary as of 5/24/2018     INR goal 2.5-3.5   Today's INR 2.52   Full warfarin instructions 1.25 mg on Mon, Wed, Fri; 2.5 mg all other days   Next INR check 5/30/2018    Indications   H/O heart valve replacement with porcine valve [Z95.3]  Long term current use of anticoagulant therapy [Z79.01]         Description     Take 1.25 mg x 2 days and 2.5 mg x 4 days and recheck on 5/30/18. Arabella Longo RN    5/24/2018  7:53 AM        Your next Anticoagulation Clinic appointment(s)     May 24, 2018  9:00 AM CDT   Anticoagulation Visit with PATRICK ANTI COAG 1   [facility] ([facility])    [address]  [facility] [address]   [phone]              May 2018 Details    Sun Mon Tue Wed Thu Fri Sat       1               2               3               4               5                 6               7               8               9               10               11               12                 13               14               15               16               17               18               19                 20               21               22               23               24      2.5 mg   See details      25      1.25 mg         26      2.5 mg           27      2.5 mg         28      1.25 mg         29      2.5 mg         30            31                  Date Details   05/24 This INR check       Date of next INR:  5/30/2018         How to take your warfarin dose     To take:  1.25 mg Take 0.5 of a 2.5 mg tablet.    To take:  2.5 mg Take 1 of the 2.5 mg tablets.

## 2018-05-24 NOTE — ED AVS SNAPSHOT
[facility]    [address]    [place] [address]    Phone:  [phone]    Fax:  [phone]                                       Milagros Peters   MRN: [id number]    Department:  [facility]   Date of Visit:  5/24/2018           After Visit Summary Signature Page     I have received my discharge instructions, and my questions have been answered. I have discussed any challenges I see with this plan with the nurse or doctor.    ..........................................................................................................................................  Patient/Patient Representative Signature      ..........................................................................................................................................  Patient Representative Print Name and Relationship to Patient    ..................................................               ................................................  Date                                            Time    ..........................................................................................................................................  Reviewed by Signature/Title    ...................................................              ..............................................  Date                                                            Time

## 2018-05-24 NOTE — ED AVS SNAPSHOT
[facility]    [address]    [place] [address]    Phone:  [phone]    Fax:  [phone]                                       Milagros Peters   MRN: [id number]    Department:  [facility]   Date of Visit:  5/24/2018           Patient Information     Date Of Birth          12/23/1931        Your diagnoses for this visit were:     Fall from standing, initial encounter     Contusion of occipital region of scalp, initial encounter     Contusion of pelvis, initial encounter     Strain of neck muscle, initial encounter     Memory loss     Profound impairment, one eye, impairment level not further specified     Iron deficiency anemia due to chronic blood loss     S/P AVR porcine valve       You were seen by Jeremi Abbott MD.      Follow-up Information     Schedule an appointment as soon as possible for a visit with Raisa Madrid MD.    Specialty:  Family Practice    Contact information:    [address]  [place] [address]  [phone]          Discharge Instructions       Dear Ms. Peters,  It was nice to see you.  As we talked your fall caused some strain and contusions but no broken bones or bleeding internally.    Your INR this morning at 5AM was 2.52.  Call the coumadin clinic this morning to review your dose.    Your Hemoglobin continues to be low at 7.9.  We can try a daily prenatal vitamin with low iron to see if it helps you feel better and gain some Hemoglobin back.    I understand your goal for moving back home and trying to maintain as much independence as possible but you are at a stage in life in which you need help to be independent.  Your desires need to be balanced by the help that you will need to be both as independent as possible as well as safe.  This will require a detailed conversation and plan with your family.    If you are unable to travel we can have home care review your needs and discuss Nursing Home placement as  well.    Dr. Miguel Angel Abbott        Your next 10 appointments already scheduled     May 24, 2018  9:00 AM CDT   Anticoagulation Visit with GH ANTI COAG 1   [facility] ([facility])    [address]  [place] [address]   [phone]            Jun 12, 2018 10:30 AM CDT   Return Visit with Moise Abbott MD   [facility] ([facility])    [address]  [place] [address]   [phone]              24 Hour Appointment Hotline       To make an appointment at any [facility], call [phone] ([phone]). If you don't have a family doctor or clinic, we will help you find one. [facility] clinics are conveniently located to serve the needs of you and your family.             Review of your medicines      START taking        Dose / Directions Last dose taken    PRENATAL VITAMIN PLUS LOW IRON 27-1 MG Tabs   Dose:  1 tablet   Quantity:  100 tablet        Take 1 tablet by mouth daily   Refills:  0          Our records show that you are taking the medicines listed below. If these are incorrect, please call your family doctor or clinic.        Dose / Directions Last dose taken    aspirin 81 MG EC tablet   Dose:  81 mg        Take 81 mg by mouth daily with food   Refills:  0        HYDROcodone-acetaminophen 7.5-325 MG per tablet   Commonly known as:  NORCO   Dose:  1 tablet   Quantity:  20 tablet        Take 1 tablet by mouth every 6 hours as needed for severe pain   Refills:  0        * omeprazole 20 MG CR capsule   Commonly known as:  priLOSEC   Dose:  20 mg   Quantity:  90 capsule        Take 1 capsule (20 mg) by mouth daily   Refills:  3        * omeprazole 20 MG CR capsule   Commonly known as:  priLOSEC   Dose:  20 mg   Quantity:  42 capsule        Take 1 capsule (20 mg) by mouth 2 times daily for 21 days   Refills:  0        order for DME   Quantity:  1 Device        Equipment being ordered: Wrist brace  with thumb support   Refills:  0        TENORMIN 50 MG tablet   Dose:  50 mg   Generic drug:  atenolol        Take 50 mg by mouth 2 times daily   Refills:  0        * warfarin 5 MG tablet   Commonly known as:  COUMADIN   Quantity:  80 tablet        Take 2.5 mg daily or as directed by protime clinic   Refills:  1        * warfarin 2.5 MG tablet   Commonly known as:  COUMADIN   Quantity:  100 tablet        Take 2.5 mg daily or as directed by protime clinic   Refills:  1        * Notice:  This list has 4 medication(s) that are the same as other medications prescribed for you. Read the directions carefully, and ask your doctor or other care provider to review them with you.            Prescriptions were sent or printed at these locations (1 Prescription)                   [facility] [address] - [place], [place] - [address]   [address] [facility] [address]    Telephone:  [phone]   Fax:  [phone]   Hours:                  E-Prescribed (1 of 1)         Prenatal Vit-Fe Fumarate-FA (PRENATAL VITAMIN PLUS LOW IRON) 27-1 MG TABS                Procedures and tests performed during your visit     Basic metabolic panel    CBC with platelets differential    CT Cervical Spine w/o Contrast    CT Head w/o Contrast    INR    XR Pelvis 1/2 Views      Orders Needing Specimen Collection     None      Pending Results     No orders found from 5/22/2018 to 5/25/2018.            Pending Culture Results     No orders found from 5/22/2018 to 5/25/2018.            Pending Results Instructions     If you had any lab results that were not finalized at the time of your Discharge, you can call the ED Lab Result RN at [phone]. You will be contacted by this team for any positive Lab results or changes in treatment. The nurses are available 7 days a week from 10A to 6:30P.  You can leave a message 24 hours per day and they will return your call.        Thank you for choosing [facility]      "  Thank you for choosing [facility] for your care. Our goal is always to provide you with excellent care. Hearing back from our patients is one way we can continue to improve our services. Please take a few minutes to complete the written survey that you may receive in the mail after you visit with us. Thank you!        Access [facility] Information     [facility] lets you send messages to your doctor, view your test results, renew your prescriptions, schedule appointments and more. To sign up, go to [email or web address] . Click on \"Log in\" on the left side of the screen, which will take you to the Welcome page. Then click on \"Sign up Now\" on the right side of the page.     You will be asked to enter the access code listed below, as well as some personal information. Please follow the directions to create your username and password.     Your access code is: [email or web address]  Expires: 7/3/2018  9:26 AM     Your access code will  in 90 days. If you need help or a new code, please call your [facility] clinic or [phone].        Care EveryWhere ID     This is your Care EveryWhere ID. This could be used by other organizations to access your [facility] medical records  [id number]        Equal Access to Services     EDILBERTO DONOHUE AH: Nile mckee Sogeremias, wamajoda lusonadaha, qaybta kaalmada adewillianda, charis field. So [facility] [phone].    ATENCIÓN: Si habla español, tiene a singh disposición servicios gratuitos de asistencia lingüística. Llame al [phone].    We comply with applicable federal civil rights laws and [place] laws. We do not discriminate on the basis of race, color, national origin, age, disability, sex, sexual orientation, or gender identity.            After Visit Summary       This is your record. Keep this with you and show to your community pharmacist(s) and doctor(s) at your next visit.                  "

## 2018-05-30 ENCOUNTER — ANTICOAGULATION THERAPY VISIT (OUTPATIENT)
Dept: ANTICOAGULATION | Facility: OTHER | Age: 83
End: 2018-05-30
Attending: FAMILY MEDICINE
Payer: MEDICARE

## 2018-05-30 DIAGNOSIS — Z95.3 H/O HEART VALVE REPLACEMENT WITH PORCINE VALVE: ICD-10-CM

## 2018-05-30 DIAGNOSIS — Z79.01 LONG TERM CURRENT USE OF ANTICOAGULANT THERAPY: ICD-10-CM

## 2018-05-30 LAB — INR POINT OF CARE: 1.9 (ref 0.86–1.14)

## 2018-05-30 PROCEDURE — 99207 ZZC NO CHARGE NURSE ONLY: CPT

## 2018-05-30 PROCEDURE — 36416 COLLJ CAPILLARY BLOOD SPEC: CPT | Mod: ZL

## 2018-05-30 NOTE — MR AVS SNAPSHOT
Milagros FAY Peters   5/30/2018 1:00 PM   Anticoagulation Therapy Visit    Description:  86 year old female   Provider:  PATRICK ANTI COAKIMBERLY 1   Department:  [facility]           INR as of 5/30/2018     Today's INR 1.9!      Anticoagulation Summary as of 5/30/2018     INR goal 2.5-3.5   Today's INR 1.9!   Full warfarin instructions 1.25 mg on Mon, Fri; 2.5 mg all other days   Next INR check 6/8/2018    Indications   H/O heart valve replacement with porcine valve [Z95.3]  Long term current use of anticoagulant therapy [Z79.01]         Description     Increase dose and recheck in 1 week. .............Arabella Longo RN.......... 5/30/2018  1:27 PM        May 2018 Details    Sun Mon Tue Wed Thu Fri Sat       1               2               3               4               5                 6               7               8               9               10               11               12                 13               14               15               16               17               18               19                 20               21               22               23               24               25               26                 27               28               29               30      2.5 mg   See details      31      2.5 mg            Date Details   05/30 This INR check               How to take your warfarin dose     To take:  2.5 mg Take 1 of the 2.5 mg tablets.           June 2018 Details    Sun Mon Tue Wed Thu Fri Sat          1      1.25 mg         2      2.5 mg           3      2.5 mg         4      1.25 mg         5      2.5 mg         6      2.5 mg         7      2.5 mg         8            9                 10               11               12               13               14               15               16                 17               18               19               20               21               22               23                 24               25               26               27                28               29               30                Date Details   No additional details    Date of next INR:  6/8/2018         How to take your warfarin dose     To take:  1.25 mg Take 0.5 of a 2.5 mg tablet.    To take:  2.5 mg Take 1 of the 2.5 mg tablets.

## 2018-05-30 NOTE — PROGRESS NOTES
ANTICOAGULATION FOLLOW-UP CLINIC VISIT    Patient Name:  Milagros Peters  Date:  5/30/2018  Contact Type:  Face to Face    SUBJECTIVE:     Patient Findings     Positives No Problem Findings           OBJECTIVE    INR Protime   Date Value Ref Range Status   05/30/2018 1.9 (A) 0.86 - 1.14 Final       ASSESSMENT / PLAN  INR assessment SUB    Recheck INR In: 1 WEEK    INR Location Clinic      Anticoagulation Summary as of 5/30/2018     INR goal 2.5-3.5   Today's INR 1.9!   Warfarin maintenance plan 1.25 mg (2.5 mg x 0.5) on Mon, Fri; 2.5 mg (2.5 mg x 1) all other days   Full warfarin instructions 1.25 mg on Mon, Fri; 2.5 mg all other days   Weekly warfarin total 15 mg   Plan last modified Arabella Longo, RN (5/30/2018)   Next INR check    Target end date Indefinite    Indications   H/O heart valve replacement with porcine valve [Z95.3]  Long term current use of anticoagulant therapy [Z79.01]         Anticoagulation Episode Summary     INR check location     Preferred lab     Resolved date 5/30/2018    Resolved reason Patient moved    Send INR reminders to  INR    Comments       Anticoagulation Care Providers     Provider Role Specialty Phone number    Raisa Madrid MD [facility] [phone]            See the Encounter Report to view Anticoagulation Flowsheet and Dosing Calendar (Go to Encounters tab in chart review, and find the Anticoagulation Therapy Visit)        Arabella Longo, RN

## 2018-07-06 NOTE — TELEPHONE ENCOUNTER
Patient Information     Patient Name MRN Sex Milagros Santos [id number] Female 1931      Telephone Encounter by Sherri Browne at 10/24/2017  4:31 PM     Author:  Sherri Browne Service:  (none) Author Type:  (none)     Filed:  10/24/2017  4:38 PM Encounter Date:  10/24/2017 Status:  Signed     :  Sherri Browne            Returned call to [facility], Arthur the pharmacist needed a verbal for the COUMADIN 2.5mg.   Sherri Browne LPN..............10/24/2017 4:38 PM          
No

## 2018-07-23 NOTE — PROGRESS NOTES
Patient Information     Patient Name  Milagros Peters MRN  [id number] Sex  Female   1931      Letter by Raisa Madrid MD at      Author:  Raisa Madrid MD Service:  (none) Author Type:  (none)    Filed:   Encounter Date:  2017 Status:  (Other)           Milagros Peters  [address] [place] [id number]          2017    Dear Ms. Peters:    Following are the tests completed during your last clinic visit.  The results of these tests are normal and require no further attention unless otherwise noted.  Your urine culture did not show an infection, and therefore no antibiotics required.          If you have any further questions or problems contact my office at the above number.  I trust this finds you in good health and spirits.      Sincerely,         Electronically signed by Raisa Madrid MD

## 2018-07-23 NOTE — PROGRESS NOTES
Patient Information     Patient Name  Milagros Peters MRN  [id number] Sex  Female   1931      Letter by Raisa Madrid MD at      Author:  Raisa Madrid MD Service:  (none) Author Type:  (none)    Filed:   Encounter Date:  2017 Status:  (Other)           Milagros Peters  [address] [place] [id number]          2017    Dear Ms. Peters:    Following are the tests completed during your last clinic visit.  The results of these tests are normal and require no further attention unless otherwise noted.    Results for orders placed or performed in visit on 17      RENAL FUNCTION PANEL      Result  Value Ref Range    SODIUM 137 133 - 143 mmol/L    POTASSIUM 4.5 3.5 - 5.1 mmol/L    CHLORIDE 99 98 - 107 mmol/L    CO2,TOTAL 28 21 - 31 mmol/L    ANION GAP 10 5 - 18                    GLUCOSE 93 70 - 105 mg/dL    CALCIUM 9.8 8.6 - 10.3 mg/dL    BUN 21 7 - 25 mg/dL    CREATININE 0.84 0.70 - 1.30 mg/dL    BUN/CREAT RATIO           25                    GFR if African American >60 >60 ml/min/1.73m2    GFR if not African American >60 >60 ml/min/1.73m2    PHOSPHORUS 3.6 2.5 - 5.0 mg/dL    ALBUMIN 4.2 3.5 - 5.7 g/dL   HEMOGLOBIN      Result  Value Ref Range    HEMOGLOBIN                12.1 12.0 - 16.0 g/dL    MCV                       82 80 - 100 fL   URINALYSIS W REFLEX MICROSCOPIC IF POSITIVE      Result  Value Ref Range    COLOR                     Yellow Yellow Color    CLARITY                   Clear Clear Clarity    SPECIFIC GRAVITY,URINE    1.010 1.010, 1.015, 1.020, 1.025                    PH,URINE                  6.5 6.0, 7.0, 8.0, 5.5, 6.5, 7.5, 8.5                    UROBILINOGEN,QUALITATIVE  Normal Normal EU/dl    PROTEIN, URINE Negative Negative mg/dL    GLUCOSE, URINE Negative Negative mg/dL    KETONES,URINE             Negative Negative mg/dL    BILIRUBIN,URINE           Negative Negative                    OCCULT BLOOD,URINE        Negative Negative                     NITRITE                   Negative Negative                    LEUKOCYTE ESTERASE        Trace (A) Negative                   URINALYSIS MICROSCOPIC      Result  Value Ref Range    RBC None Seen 0-2, None Seen /HPF    WBC None Seen 0-2, 3-5, None Seen /HPF    BACTERIA                  None Seen None Seen, Rare, Occasional, Few Bacteria/HPF    EPITHELIAL CELLS          None Seen None Seen, Few Epi/HPF          If you have any further questions or problems contact my office at the above number.  I trust this finds you in good health and spirits.      Sincerely,         Electronically signed by Raisa Madrid MD

## 2025-04-11 NOTE — ANESTHESIA POSTPROCEDURE EVALUATION
Patient: Milagros Peters    Procedure(s):  Gastroscopy w/ Biopsy - Wound Class: II-Clean Contaminated    Diagnosis:Inguinal Mass  Diagnosis Additional Information: No value filed.    Anesthesia Type:  MAC    Note:  Anesthesia Post Evaluation    Patient location during evaluation: Phase 2  Patient participation: Able to fully participate in evaluation  Level of consciousness: awake and alert  Pain management: adequate  Airway patency: patent  Cardiovascular status: acceptable  Respiratory status: acceptable  Hydration status: acceptable  PONV: none             Last vitals:  Vitals:    04/30/18 1200 04/30/18 1227 04/30/18 1325   BP: 137/47  130/47   Pulse:   75   Temp: 97.2  F (36.2  C)  97.2  F (36.2  C)   SpO2: 98% 98% 94%         Electronically Signed By: JESSI GOODMAN CRNA  April 30, 2018  1:35 PM  
Instructions: This plan will send the code FBSE to the PM system.  DO NOT or CHANGE the price.
Detail Level: Generalized
Price (Do Not Change): 0.00

## (undated) DEVICE — TUBING SUCTION 10'X3/16" N510

## (undated) DEVICE — ENDO KIT COMPLIANCE DYKENDOCMPLY

## (undated) DEVICE — SOL WATER 1500ML

## (undated) DEVICE — Device

## (undated) DEVICE — SUCTION MANIFOLD NEPTUNE 2 SYS 4 PORT 0702-020-000

## (undated) DEVICE — SYR 50ML LL W/O NDL 309653

## (undated) DEVICE — ENDO FORCEP ENDOJAW BIOPSY 2.8MMX230CM FB-220U

## (undated) DEVICE — ENDO BITE BLOCK 60 MAXI LF 00712804

## (undated) RX ORDER — ACETAMINOPHEN 325 MG/1
TABLET ORAL
Status: DISPENSED
Start: 2018-05-24

## (undated) RX ORDER — CEFTRIAXONE SODIUM 1 G/50ML
INJECTION, SOLUTION INTRAVENOUS
Status: DISPENSED
Start: 2018-04-12

## (undated) RX ORDER — CIPROFLOXACIN 2 MG/ML
INJECTION, SOLUTION INTRAVENOUS
Status: DISPENSED
Start: 2018-04-12

## (undated) RX ORDER — ACETAMINOPHEN 325 MG/1
TABLET ORAL
Status: DISPENSED
Start: 2018-04-04

## (undated) RX ORDER — LIDOCAINE HYDROCHLORIDE 20 MG/ML
INJECTION, SOLUTION EPIDURAL; INFILTRATION; INTRACAUDAL; PERINEURAL
Status: DISPENSED
Start: 2018-04-30

## (undated) RX ORDER — PROPOFOL 10 MG/ML
INJECTION, EMULSION INTRAVENOUS
Status: DISPENSED
Start: 2018-04-30